# Patient Record
Sex: FEMALE | Race: WHITE | Employment: OTHER | URBAN - METROPOLITAN AREA
[De-identification: names, ages, dates, MRNs, and addresses within clinical notes are randomized per-mention and may not be internally consistent; named-entity substitution may affect disease eponyms.]

---

## 2017-10-19 ENCOUNTER — HOSPITAL ENCOUNTER (OUTPATIENT)
Dept: CT IMAGING | Age: 72
Discharge: HOME | End: 2017-10-19
Attending: FAMILY MEDICINE
Payer: MEDICARE

## 2017-10-19 DIAGNOSIS — R10.9 ACUTE LEFT FLANK PAIN: ICD-10-CM

## 2017-10-19 PROCEDURE — 74176 CT ABD & PELVIS W/O CONTRAST: CPT

## 2017-10-19 NOTE — PROGRESS NOTES
Discussed results with pt and the flank pain has resolved.  Reviewed diet changes for diverticulosis and if pain returns will refer to GI

## 2017-10-24 ENCOUNTER — APPOINTMENT (RX ONLY)
Dept: URBAN - METROPOLITAN AREA CLINIC 349 | Facility: CLINIC | Age: 72
Setting detail: DERMATOLOGY
End: 2017-10-24

## 2017-10-24 DIAGNOSIS — D18.0 HEMANGIOMA: ICD-10-CM

## 2017-10-24 DIAGNOSIS — B07.8 OTHER VIRAL WARTS: ICD-10-CM

## 2017-10-24 PROBLEM — D18.01 HEMANGIOMA OF SKIN AND SUBCUTANEOUS TISSUE: Status: ACTIVE | Noted: 2017-10-24

## 2017-10-24 PROCEDURE — ? COUNSELING

## 2017-10-24 PROCEDURE — 99242 OFF/OP CONSLTJ NEW/EST SF 20: CPT

## 2017-10-24 PROCEDURE — ? RECOMMENDATIONS

## 2017-10-24 ASSESSMENT — LOCATION ZONE DERM
LOCATION ZONE: TRUNK
LOCATION ZONE: MUCOUS_MEMBRANE

## 2017-10-24 ASSESSMENT — LOCATION DETAILED DESCRIPTION DERM
LOCATION DETAILED: UPPER STERNUM
LOCATION DETAILED: LEFT VENTRAL TONGUE

## 2017-10-24 ASSESSMENT — LOCATION SIMPLE DESCRIPTION DERM
LOCATION SIMPLE: VENTRAL TONGUE
LOCATION SIMPLE: CHEST

## 2018-05-01 ENCOUNTER — APPOINTMENT (RX ONLY)
Dept: URBAN - METROPOLITAN AREA CLINIC 349 | Facility: CLINIC | Age: 73
Setting detail: DERMATOLOGY
End: 2018-05-01

## 2018-05-01 DIAGNOSIS — B07.8 OTHER VIRAL WARTS: ICD-10-CM

## 2018-05-01 DIAGNOSIS — D18.0 HEMANGIOMA: ICD-10-CM

## 2018-05-01 PROBLEM — D18.01 HEMANGIOMA OF SKIN AND SUBCUTANEOUS TISSUE: Status: ACTIVE | Noted: 2018-05-01

## 2018-05-01 PROCEDURE — 11301 SHAVE SKIN LESION 0.6-1.0 CM: CPT

## 2018-05-01 PROCEDURE — ? COUNSELING

## 2018-05-01 PROCEDURE — 88305 TISSUE EXAM BY PATHOLOGIST: CPT

## 2018-05-01 PROCEDURE — ? RECOMMENDATIONS

## 2018-05-01 PROCEDURE — ? SHAVE REMOVAL

## 2018-05-01 PROCEDURE — A4550 SURGICAL TRAYS: HCPCS

## 2018-05-01 PROCEDURE — 99213 OFFICE O/P EST LOW 20 MIN: CPT | Mod: 25

## 2018-05-01 PROCEDURE — ? PATHOLOGY BILLING

## 2018-05-01 ASSESSMENT — LOCATION ZONE DERM
LOCATION ZONE: TRUNK
LOCATION ZONE: TRUNK
LOCATION ZONE: MUCOUS_MEMBRANE

## 2018-05-01 ASSESSMENT — LOCATION DETAILED DESCRIPTION DERM
LOCATION DETAILED: UPPER STERNUM
LOCATION DETAILED: UPPER STERNUM
LOCATION DETAILED: LEFT VENTRAL TONGUE

## 2018-05-01 ASSESSMENT — LOCATION SIMPLE DESCRIPTION DERM
LOCATION SIMPLE: CHEST
LOCATION SIMPLE: CHEST
LOCATION SIMPLE: VENTRAL TONGUE

## 2018-05-01 NOTE — PROCEDURE: RECOMMENDATIONS
Detail Level: Zone
Recommendations (Free Text): Patient states she went to ENT for lesion under tongue in January and they removed it, she states told was benign

## 2018-05-01 NOTE — PROCEDURE: SHAVE REMOVAL
Size Of Lesion In Cm (Required): 0.7
Billing Type: Third-Party Bill
Accession #: [id number]
Anesthesia Volume In Cc: 0.5
Biopsy Method: Ashli ruiz
Anesthesia Type: 1% lidocaine with epinephrine and a 1:10 solution of 8.4% sodium bicarbonate
Wound Care: Vaseline
Hemostasis: Aluminum Chloride
Post-Care Instructions: I reviewed with the patient in detail post-care instructions. Patient is to keep the biopsy site dry overnight, and then apply bacitracin twice daily until healed. Patient may apply hydrogen peroxide soaks to remove any crusting.
Medical Necessity Clause: This procedure was medically necessary because the lesion that was treated was: irritated and two tone color
Medical Necessity Information: It is in your best interest to select a reason for this procedure from the list below. All of these items fulfill various CMS LCD requirements except the new and changing color options.
Was A Bandage Applied: Yes
Bill 75248 For Specimen Handling/Conveyance To Laboratory?: no
X Size Of Lesion In Cm (Optional): 0
Notification Instructions: Patient will be notified of biopsy results. However, patient instructed to call the office if not contacted within 2 weeks.
Consent was obtained from the patient. The risks and benefits to therapy were discussed in detail. Specifically, the risks of infection, scarring, bleeding, prolonged wound healing, incomplete removal, allergy to anesthesia, nerve injury and recurrence were addressed. Prior to the procedure, the treatment site was clearly identified and confirmed by the patient. All components of Universal Protocol/PAUSE Rule completed.
Detail Level: Detailed

## 2023-03-06 ENCOUNTER — OFFICE VISIT (OUTPATIENT)
Dept: INTERNAL MEDICINE CLINIC | Facility: CLINIC | Age: 78
End: 2023-03-06

## 2023-03-06 VITALS
SYSTOLIC BLOOD PRESSURE: 138 MMHG | HEIGHT: 63 IN | DIASTOLIC BLOOD PRESSURE: 78 MMHG | BODY MASS INDEX: 23.32 KG/M2 | OXYGEN SATURATION: 95 % | HEART RATE: 79 BPM | WEIGHT: 131.6 LBS

## 2023-03-06 DIAGNOSIS — Z12.31 ENCOUNTER FOR SCREENING MAMMOGRAM FOR MALIGNANT NEOPLASM OF BREAST: ICD-10-CM

## 2023-03-06 DIAGNOSIS — Z12.39 ENCOUNTER FOR SCREENING FOR MALIGNANT NEOPLASM OF BREAST, UNSPECIFIED SCREENING MODALITY: ICD-10-CM

## 2023-03-06 DIAGNOSIS — K57.90 DIVERTICULOSIS: ICD-10-CM

## 2023-03-06 DIAGNOSIS — Z00.00 ENCOUNTER FOR MEDICAL EXAMINATION TO ESTABLISH CARE: ICD-10-CM

## 2023-03-06 DIAGNOSIS — Z09 ENCOUNTER FOR FOLLOW-UP EXAMINATION AFTER COMPLETED TREATMENT FOR CONDITIONS OTHER THAN MALIGNANT NEOPLASM: ICD-10-CM

## 2023-03-06 DIAGNOSIS — R19.8 ABDOMINAL DISORDERS: ICD-10-CM

## 2023-03-06 DIAGNOSIS — M25.9: ICD-10-CM

## 2023-03-06 DIAGNOSIS — R19.8 ABDOMINAL DISORDERS: Primary | ICD-10-CM

## 2023-03-06 DIAGNOSIS — Z12.11 SCREEN FOR COLON CANCER: ICD-10-CM

## 2023-03-06 DIAGNOSIS — Z13.820 SCREENING FOR OSTEOPOROSIS: ICD-10-CM

## 2023-03-06 LAB
BASOPHILS # BLD: 0.1 K/UL (ref 0–0.2)
BASOPHILS NFR BLD: 1 % (ref 0–2)
DIFFERENTIAL METHOD BLD: ABNORMAL
EOSINOPHIL # BLD: 0.3 K/UL (ref 0–0.8)
EOSINOPHIL NFR BLD: 3 % (ref 0.5–7.8)
ERYTHROCYTE [DISTWIDTH] IN BLOOD BY AUTOMATED COUNT: 12.5 % (ref 11.9–14.6)
HCT VFR BLD AUTO: 30.7 % (ref 35.8–46.3)
HGB BLD-MCNC: 9.7 G/DL (ref 11.7–15.4)
IMM GRANULOCYTES # BLD AUTO: 0 K/UL (ref 0–0.5)
IMM GRANULOCYTES NFR BLD AUTO: 0 % (ref 0–5)
LYMPHOCYTES # BLD: 2.8 K/UL (ref 0.5–4.6)
LYMPHOCYTES NFR BLD: 31 % (ref 13–44)
MCH RBC QN AUTO: 28.8 PG (ref 26.1–32.9)
MCHC RBC AUTO-ENTMCNC: 31.6 G/DL (ref 31.4–35)
MCV RBC AUTO: 91.1 FL (ref 82–102)
MONOCYTES # BLD: 0.7 K/UL (ref 0.1–1.3)
MONOCYTES NFR BLD: 7 % (ref 4–12)
NEUTS SEG # BLD: 5.1 K/UL (ref 1.7–8.2)
NEUTS SEG NFR BLD: 58 % (ref 43–78)
NRBC # BLD: 0 K/UL (ref 0–0.2)
PLATELET # BLD AUTO: 395 K/UL (ref 150–450)
PMV BLD AUTO: 11.6 FL (ref 9.4–12.3)
RBC # BLD AUTO: 3.37 M/UL (ref 4.05–5.2)
WBC # BLD AUTO: 9 K/UL (ref 4.3–11.1)

## 2023-03-06 RX ORDER — MULTIVIT WITH MINERALS/LUTEIN
250 TABLET ORAL DAILY
COMMUNITY

## 2023-03-06 SDOH — ECONOMIC STABILITY: FOOD INSECURITY: WITHIN THE PAST 12 MONTHS, YOU WORRIED THAT YOUR FOOD WOULD RUN OUT BEFORE YOU GOT MONEY TO BUY MORE.: NEVER TRUE

## 2023-03-06 SDOH — ECONOMIC STABILITY: HOUSING INSECURITY
IN THE LAST 12 MONTHS, WAS THERE A TIME WHEN YOU DID NOT HAVE A STEADY PLACE TO SLEEP OR SLEPT IN A SHELTER (INCLUDING NOW)?: NO

## 2023-03-06 SDOH — ECONOMIC STABILITY: INCOME INSECURITY: HOW HARD IS IT FOR YOU TO PAY FOR THE VERY BASICS LIKE FOOD, HOUSING, MEDICAL CARE, AND HEATING?: NOT VERY HARD

## 2023-03-06 SDOH — ECONOMIC STABILITY: FOOD INSECURITY: WITHIN THE PAST 12 MONTHS, THE FOOD YOU BOUGHT JUST DIDN'T LAST AND YOU DIDN'T HAVE MONEY TO GET MORE.: NEVER TRUE

## 2023-03-06 ASSESSMENT — PATIENT HEALTH QUESTIONNAIRE - PHQ9
1. LITTLE INTEREST OR PLEASURE IN DOING THINGS: 0
2. FEELING DOWN, DEPRESSED OR HOPELESS: 0
SUM OF ALL RESPONSES TO PHQ QUESTIONS 1-9: 0
SUM OF ALL RESPONSES TO PHQ9 QUESTIONS 1 & 2: 0
SUM OF ALL RESPONSES TO PHQ QUESTIONS 1-9: 0

## 2023-03-06 ASSESSMENT — ENCOUNTER SYMPTOMS
GASTROINTESTINAL NEGATIVE: 1
RESPIRATORY NEGATIVE: 1

## 2023-03-06 NOTE — PROGRESS NOTES
FOLLOW UP VISIT    Subjective:    Lisa Mccarthy (: 1945) is a 70 y.o., female,   Chief Complaint   Patient presents with    [facility]       HPI:  Very nice 70year old in to [facility]. She has the following medical issues  1. Cataract Removal in  has an eye Doctor [facility] eye  2. HCM  Mammogram- 10 years ago  COLO- 12 years ago   Dexa never  Vaccines needs pneuo vax has had TDAP, Flu and Shingles   3. Today she is c/o lump in her groin that she has had for a while since 2017.    4. Diverticula         The following portions of the patient's history were reviewed and updated as appropriate:      No past medical history on file. Past Surgical History:   Procedure Laterality Date    CATARACT REMOVAL Bilateral        No family history on file. Social History     Socioeconomic History    Marital status: Life Partner     Spouse name: Not on file    Number of children: Not on file    Years of education: Not on file    Highest education level: Not on file   Occupational History    Not on file   Tobacco Use    Smoking status: Former     Packs/day: 0.25     Years: 27.00     Pack years: 6.75     Types: Cigarettes     Start date: 5     Quit date:      Years since quittin.1    Smokeless tobacco: Never   Substance and Sexual Activity    Alcohol use: Yes     Alcohol/week: 3.0 standard drinks     Types: 3 Glasses of wine per week    Drug use: Not on file    Sexual activity: Not Currently   Other Topics Concern    Not on file   Social History Narrative    Not on file     Social Determinants of Health     Financial Resource Strain: Low Risk     Difficulty of Paying Living Expenses: Not very hard   Food Insecurity: No Food Insecurity    Worried About Running Out of Food in the Last Year: Never true    Ran Out of Food in the Last Year: Never true   Transportation Needs: Unknown    Lack of Transportation (Medical): Not on file    Lack of Transportation (Non-Medical):  No   Physical Activity: Not on file   Stress: Not on file   Social Connections: Not on file   Intimate Partner Violence: Not on file   Housing Stability: Unknown    Unable to Pay for Housing in the Last Year: Not on file    Number of Places Lived in the Last Year: Not on file    Unstable Housing in the Last Year: No       Current Outpatient Medications   Medication Sig Dispense Refill    Probiotic Product (PROBIOTIC DAILY PO) Take by mouth daily      Ascorbic Acid (VITAMIN C) 250 MG tablet Take 250 mg by mouth daily      Multiple Vitamin (MULTIVITAMIN ADULT PO) Take by mouth daily      cyanocobalamin 1000 MCG tablet Take 1,000 mcg by mouth daily (Patient not taking: Reported on 3/6/2023)       No current facility-administered medications for this visit. Allergies as of 03/06/2023    (No Known Allergies)       Review of Systems   Constitutional: Negative. Respiratory: Negative. Cardiovascular: Negative. Gastrointestinal: Negative. Genitourinary: Negative. Objective:    Blood pressure 138/78, pulse 79, height 5' 3\" (1.6 m), weight 131 lb 9.6 oz (59.7 kg), SpO2 95 %. Physical Exam    No results found for this visit on 03/06/23. Assessent & Plan    1. Cataract Removal in 2021 has an eye Doctor [facility] eye get records  2. HCM- will order screens  Mammogram- 10 years ago   COLO- 12 years ago   Dexa never  Vaccines needs pneuo forrest has had TDAP, Flu and Shingles   3. Today she is c/o lump in her groin that she has had for a while since 2017.  - check CT check labs   4. Diverticula     The patient and/or patient representative voiced understanding and agreement with the current diagnoses, recommendations, and possible side effects. No follow-up provider specified.       Cesar Abad MD

## 2023-03-07 LAB
ALBUMIN SERPL-MCNC: 4.2 G/DL (ref 3.2–4.6)
ALBUMIN/GLOB SERPL: 1.5 (ref 0.4–1.6)
ALP SERPL-CCNC: 80 U/L (ref 50–136)
ALT SERPL-CCNC: 26 U/L (ref 12–65)
ANION GAP SERPL CALC-SCNC: 2 MMOL/L (ref 2–11)
AST SERPL-CCNC: 18 U/L (ref 15–37)
BILIRUB SERPL-MCNC: 0.2 MG/DL (ref 0.2–1.1)
BUN SERPL-MCNC: 18 MG/DL (ref 8–23)
CALCIUM SERPL-MCNC: 9.4 MG/DL (ref 8.3–10.4)
CHLORIDE SERPL-SCNC: 110 MMOL/L (ref 101–110)
CO2 SERPL-SCNC: 25 MMOL/L (ref 21–32)
CREAT SERPL-MCNC: 0.9 MG/DL (ref 0.6–1)
GLOBULIN SER CALC-MCNC: 2.8 G/DL (ref 2.8–4.5)
GLUCOSE SERPL-MCNC: 112 MG/DL (ref 65–100)
POTASSIUM SERPL-SCNC: 4.3 MMOL/L (ref 3.5–5.1)
PROT SERPL-MCNC: 7 G/DL (ref 6.3–8.2)
SODIUM SERPL-SCNC: 137 MMOL/L (ref 133–143)

## 2023-03-09 ENCOUNTER — PATIENT MESSAGE (OUTPATIENT)
Dept: INTERNAL MEDICINE CLINIC | Facility: CLINIC | Age: 78
End: 2023-03-09

## 2023-03-09 DIAGNOSIS — R19.8 ABDOMINAL DISORDERS: ICD-10-CM

## 2023-03-09 DIAGNOSIS — Z09 ENCOUNTER FOR FOLLOW-UP EXAMINATION AFTER COMPLETED TREATMENT FOR CONDITIONS OTHER THAN MALIGNANT NEOPLASM: ICD-10-CM

## 2023-03-09 DIAGNOSIS — Z12.31 ENCOUNTER FOR SCREENING MAMMOGRAM FOR MALIGNANT NEOPLASM OF BREAST: ICD-10-CM

## 2023-03-09 DIAGNOSIS — M25.9: ICD-10-CM

## 2023-03-09 DIAGNOSIS — Z00.00 ENCOUNTER FOR MEDICAL EXAMINATION TO ESTABLISH CARE: ICD-10-CM

## 2023-03-09 DIAGNOSIS — Z13.820 SCREENING FOR OSTEOPOROSIS: ICD-10-CM

## 2023-03-10 ENCOUNTER — TELEPHONE (OUTPATIENT)
Dept: INTERNAL MEDICINE CLINIC | Facility: CLINIC | Age: 78
End: 2023-03-10

## 2023-03-10 NOTE — TELEPHONE ENCOUNTER
----- Message from [address] [place] [address]. Coy Ramirez sent at 3/9/2023  5:06 PM EST -----  Regarding: CT abdominal scan  Sarkis Naranjo,    I just called scheduling and they need to know when you sent over the FAX because they do not have it. Please contact me in the morning and let me know what is going on. I don't need a mammogram and I don't need a bone density test, however I do need a CT scan.     Thanks ,  verónica rothman

## 2023-03-10 NOTE — TELEPHONE ENCOUNTER
From: Ryanne Albright  To: Dr. Horta Sender: 3/9/2023 4:03 PM EST  Subject: CT abdominal scan    Good Afternoon Dr. Gisel Jason,  I was in your office on Monday for a lump in my groin area. You suggested a CT scan, blood work Mammogram, and Bone Density test.   I've had the bloodwork done and a Mammogram and Bone Density test have been scheduled. When they scheduled the above tests, I asked about the CT scan, and they were unaware of it. I feel for the reason I came in, this would be the most important test. I see you requested it, but none one has scheduled it.   Please advise ,  thank you Alfonso Keen

## 2023-03-14 ENCOUNTER — TELEMEDICINE (OUTPATIENT)
Dept: INTERNAL MEDICINE CLINIC | Facility: CLINIC | Age: 78
End: 2023-03-14
Payer: MEDICARE

## 2023-03-14 DIAGNOSIS — D51.3 OTHER DIETARY VITAMIN B12 DEFICIENCY ANEMIA: ICD-10-CM

## 2023-03-14 DIAGNOSIS — D64.9 ANEMIA, UNSPECIFIED TYPE: Primary | ICD-10-CM

## 2023-03-14 DIAGNOSIS — R73.9 HIGH BLOOD SUGAR: ICD-10-CM

## 2023-03-14 PROCEDURE — 99214 OFFICE O/P EST MOD 30 MIN: CPT | Performed by: INTERNAL MEDICINE

## 2023-03-14 PROCEDURE — 1123F ACP DISCUSS/DSCN MKR DOCD: CPT | Performed by: INTERNAL MEDICINE

## 2023-03-14 PROCEDURE — 1036F TOBACCO NON-USER: CPT | Performed by: INTERNAL MEDICINE

## 2023-03-14 PROCEDURE — G8420 CALC BMI NORM PARAMETERS: HCPCS | Performed by: INTERNAL MEDICINE

## 2023-03-14 PROCEDURE — G8400 PT W/DXA NO RESULTS DOC: HCPCS | Performed by: INTERNAL MEDICINE

## 2023-03-14 PROCEDURE — G8484 FLU IMMUNIZE NO ADMIN: HCPCS | Performed by: INTERNAL MEDICINE

## 2023-03-14 PROCEDURE — G8427 DOCREV CUR MEDS BY ELIG CLIN: HCPCS | Performed by: INTERNAL MEDICINE

## 2023-03-14 PROCEDURE — 1090F PRES/ABSN URINE INCON ASSESS: CPT | Performed by: INTERNAL MEDICINE

## 2023-03-14 NOTE — PROGRESS NOTES
FOLLOW UP VISIT    Subjective:    Brayan Mitchell (: 1945) is a 70 y.o., female,   Chief Complaint   Patient presents with    Follow-up       HPI:  Very nice patient in for follow up. 1. Anemia- last colo over 12 years ago has been referred . Has not had it yet . Has been on iron and b12 in past   2. Cataracts s/p removal  3. High blood sugar - has noA1C done         The following portions of the patient's history were reviewed and updated as appropriate:      No past medical history on file. Past Surgical History:   Procedure Laterality Date    CATARACT REMOVAL Bilateral        No family history on file. Social History     Socioeconomic History    Marital status:      Spouse name: Not on file    Number of children: Not on file    Years of education: Not on file    Highest education level: Not on file   Occupational History    Not on file   Tobacco Use    Smoking status: Former     Packs/day: 0.25     Years: 27.00     Pack years: 6.75     Types: Cigarettes     Start date:      Quit date:      Years since quittin.2    Smokeless tobacco: Never   Substance and Sexual Activity    Alcohol use: Yes     Alcohol/week: 3.0 standard drinks     Types: 3 Glasses of wine per week    Drug use: Not on file    Sexual activity: Not Currently   Other Topics Concern    Not on file   Social History Narrative    Not on file     Social Determinants of Health     Financial Resource Strain: Low Risk     Difficulty of Paying Living Expenses: Not very hard   Food Insecurity: No Food Insecurity    Worried About Running Out of Food in the Last Year: Never true    Ran Out of Food in the Last Year: Never true   Transportation Needs: Unknown    Lack of Transportation (Medical): Not on file    Lack of Transportation (Non-Medical):  No   Physical Activity: Not on file   Stress: Not on file   Social Connections: Not on file   Intimate Partner Violence: Not on file   Housing Stability: Unknown    Unable to Pay for Housing in the Last Year: Not on file    Number of Places Lived in the Last Year: Not on file    Unstable Housing in the Last Year: No       Current Outpatient Medications   Medication Sig Dispense Refill    Probiotic Product (PROBIOTIC DAILY PO) Take by mouth daily      Ascorbic Acid (VITAMIN C) 250 MG tablet Take 250 mg by mouth daily      Multiple Vitamin (MULTIVITAMIN ADULT PO) Take by mouth daily       No current facility-administered medications for this visit. Allergies as of 03/14/2023    (No Known Allergies)       Review of Systems    Objective: There were no vitals taken for this visit. Physical Exam    No results found for this visit on 03/14/23. Assessent & Plan     Diagnosis Orders   1. Anemia, unspecified type  Vitamin B12    Iron      2. High blood sugar        3. Other dietary vitamin B12 deficiency anemia   Vitamin B12           Has a hx of anemia  Check B12 and Iron  Has not had CT or GI consult yet    Samara Ferreira was evaluated through a synchronous (real-time) audio-video encounter, and/or her healthcare decision maker, is aware that it is a billable service, which includes applicable co-pays, with coverage as determined by her insurance carrier. She provided verbal consent to proceed and patient identification was verified. This visit was conducted pursuant to the emergency declaration under the [facility], [address] [place] [address] authority and the [employer] General Act. A caregiver was present when appropriate. Ability to conduct physical exam was limited. The patient was located at home in a state where the provider was licensed to provide care. --Tori Tanner MD on 3/14/2023 at 11:02 AM    An electronic signature was used to authenticate this note.     The patient and/or patient representative voiced understanding and agreement with the current diagnoses, recommendations, and possible side effects. No follow-up provider specified.       Dania King MD

## 2023-03-15 DIAGNOSIS — M25.9: Primary | ICD-10-CM

## 2023-03-15 DIAGNOSIS — D64.9 ANEMIA, UNSPECIFIED TYPE: ICD-10-CM

## 2023-03-15 DIAGNOSIS — M25.9: ICD-10-CM

## 2023-03-15 DIAGNOSIS — D51.3 OTHER DIETARY VITAMIN B12 DEFICIENCY ANEMIA: ICD-10-CM

## 2023-03-15 DIAGNOSIS — R19.8 ABDOMINAL DISORDERS: ICD-10-CM

## 2023-03-16 LAB
IRON SERPL-MCNC: 78 UG/DL (ref 35–150)
VIT B12 SERPL-MCNC: 337 PG/ML (ref 193–986)

## 2023-03-22 ENCOUNTER — HOSPITAL ENCOUNTER (OUTPATIENT)
Dept: CT IMAGING | Age: 78
Discharge: HOME | End: 2023-03-25

## 2023-03-22 DIAGNOSIS — R19.8 ABDOMINAL DISORDERS: ICD-10-CM

## 2023-03-22 DIAGNOSIS — M25.9: ICD-10-CM

## 2023-03-27 ENCOUNTER — TELEMEDICINE (OUTPATIENT)
Dept: INTERNAL MEDICINE CLINIC | Facility: CLINIC | Age: 78
End: 2023-03-27
Payer: MEDICARE

## 2023-03-27 DIAGNOSIS — N28.9 RENAL LESION: Primary | ICD-10-CM

## 2023-03-27 DIAGNOSIS — K57.90 DIVERTICULOSIS: ICD-10-CM

## 2023-03-27 DIAGNOSIS — K86.2 PANCREAS CYST: ICD-10-CM

## 2023-03-27 DIAGNOSIS — R93.5 ABNORMAL FINDINGS ON DIAGNOSTIC IMAGING OF OTHER ABDOMINAL REGIONS, INCLUDING RETROPERITONEUM: ICD-10-CM

## 2023-03-27 DIAGNOSIS — D64.9 ANEMIA, UNSPECIFIED TYPE: ICD-10-CM

## 2023-03-27 PROCEDURE — 1090F PRES/ABSN URINE INCON ASSESS: CPT | Performed by: INTERNAL MEDICINE

## 2023-03-27 PROCEDURE — G8400 PT W/DXA NO RESULTS DOC: HCPCS | Performed by: INTERNAL MEDICINE

## 2023-03-27 PROCEDURE — G8427 DOCREV CUR MEDS BY ELIG CLIN: HCPCS | Performed by: INTERNAL MEDICINE

## 2023-03-27 PROCEDURE — G8484 FLU IMMUNIZE NO ADMIN: HCPCS | Performed by: INTERNAL MEDICINE

## 2023-03-27 PROCEDURE — 1036F TOBACCO NON-USER: CPT | Performed by: INTERNAL MEDICINE

## 2023-03-27 PROCEDURE — 1123F ACP DISCUSS/DSCN MKR DOCD: CPT | Performed by: INTERNAL MEDICINE

## 2023-03-27 PROCEDURE — G8420 CALC BMI NORM PARAMETERS: HCPCS | Performed by: INTERNAL MEDICINE

## 2023-03-27 PROCEDURE — 99214 OFFICE O/P EST MOD 30 MIN: CPT | Performed by: INTERNAL MEDICINE

## 2023-03-27 NOTE — PROGRESS NOTES
FOLLOW UP VISIT    Subjective:    Nelly Vivas (: 1945) is a 70 y.o., female,   No chief complaint on file. HPI:  HPI  Anemia- has not seen GI -   Finger pain is improved with D   Inguinal Hernia- on CT   Diverticulosis  Renal lesion- needs US   Pancreatic Cyst stable from 2017 repeat in 1-2 years    The following portions of the patient's history were reviewed and updated as appropriate:      History reviewed. No pertinent past medical history. Past Surgical History:   Procedure Laterality Date    CATARACT REMOVAL Bilateral        History reviewed. No pertinent family history. Social History     Socioeconomic History    Marital status:      Spouse name: Not on file    Number of children: Not on file    Years of education: Not on file    Highest education level: Not on file   Occupational History    Not on file   Tobacco Use    Smoking status: Former     Packs/day: 0.25     Years: 27.00     Pack years: 6.75     Types: Cigarettes     Start date: 5     Quit date:      Years since quittin.2    Smokeless tobacco: Never   Substance and Sexual Activity    Alcohol use: Yes     Alcohol/week: 3.0 standard drinks     Types: 3 Glasses of wine per week    Drug use: Not on file    Sexual activity: Not Currently   Other Topics Concern    Not on file   Social History Narrative    Not on file     Social Determinants of Health     Financial Resource Strain: Low Risk     Difficulty of Paying Living Expenses: Not very hard   Food Insecurity: No Food Insecurity    Worried About Running Out of Food in the Last Year: Never true    Ran Out of Food in the Last Year: Never true   Transportation Needs: Unknown    Lack of Transportation (Medical): Not on file    Lack of Transportation (Non-Medical):  No   Physical Activity: Not on file   Stress: Not on file   Social Connections: Not on file   Intimate Partner Violence: Not on file   Housing Stability: Unknown    Unable to Pay for Housing in the

## 2023-03-31 ENCOUNTER — HOSPITAL ENCOUNTER (OUTPATIENT)
Dept: MAMMOGRAPHY | Age: 78
End: 2023-03-31
Payer: MEDICARE

## 2023-03-31 PROCEDURE — 77067 SCR MAMMO BI INCL CAD: CPT

## 2023-03-31 PROCEDURE — 77080 DXA BONE DENSITY AXIAL: CPT

## 2023-04-04 ENCOUNTER — HOSPITAL ENCOUNTER (OUTPATIENT)
Dept: ULTRASOUND IMAGING | Age: 78
Discharge: HOME | End: 2023-04-07

## 2023-04-04 DIAGNOSIS — N28.9 RENAL LESION: ICD-10-CM

## 2023-04-04 DIAGNOSIS — N28.89 RENAL MASS: ICD-10-CM

## 2023-04-04 DIAGNOSIS — N28.89 RENAL MASS: Primary | ICD-10-CM

## 2023-04-18 ENCOUNTER — OFFICE VISIT (OUTPATIENT)
Dept: INTERNAL MEDICINE CLINIC | Facility: CLINIC | Age: 78
End: 2023-04-18
Payer: MEDICARE

## 2023-04-18 VITALS
SYSTOLIC BLOOD PRESSURE: 100 MMHG | HEART RATE: 74 BPM | HEIGHT: 62 IN | WEIGHT: 128.4 LBS | OXYGEN SATURATION: 98 % | BODY MASS INDEX: 23.63 KG/M2 | DIASTOLIC BLOOD PRESSURE: 60 MMHG

## 2023-04-18 DIAGNOSIS — D64.9 ANEMIA, UNSPECIFIED TYPE: ICD-10-CM

## 2023-04-18 DIAGNOSIS — K40.90 DIRECT INGUINAL HERNIA: Primary | ICD-10-CM

## 2023-04-18 PROCEDURE — G8420 CALC BMI NORM PARAMETERS: HCPCS | Performed by: INTERNAL MEDICINE

## 2023-04-18 PROCEDURE — 1090F PRES/ABSN URINE INCON ASSESS: CPT | Performed by: INTERNAL MEDICINE

## 2023-04-18 PROCEDURE — 99214 OFFICE O/P EST MOD 30 MIN: CPT | Performed by: INTERNAL MEDICINE

## 2023-04-18 PROCEDURE — G8399 PT W/DXA RESULTS DOCUMENT: HCPCS | Performed by: INTERNAL MEDICINE

## 2023-04-18 PROCEDURE — 1036F TOBACCO NON-USER: CPT | Performed by: INTERNAL MEDICINE

## 2023-04-18 PROCEDURE — G8427 DOCREV CUR MEDS BY ELIG CLIN: HCPCS | Performed by: INTERNAL MEDICINE

## 2023-04-18 PROCEDURE — 1123F ACP DISCUSS/DSCN MKR DOCD: CPT | Performed by: INTERNAL MEDICINE

## 2023-04-18 ASSESSMENT — PATIENT HEALTH QUESTIONNAIRE - PHQ9
SUM OF ALL RESPONSES TO PHQ QUESTIONS 1-9: 0
1. LITTLE INTEREST OR PLEASURE IN DOING THINGS: 0
SUM OF ALL RESPONSES TO PHQ QUESTIONS 1-9: 0
SUM OF ALL RESPONSES TO PHQ9 QUESTIONS 1 & 2: 0
SUM OF ALL RESPONSES TO PHQ QUESTIONS 1-9: 0
SUM OF ALL RESPONSES TO PHQ QUESTIONS 1-9: 0
2. FEELING DOWN, DEPRESSED OR HOPELESS: 0

## 2023-04-18 ASSESSMENT — ENCOUNTER SYMPTOMS
RESPIRATORY NEGATIVE: 1
GASTROINTESTINAL NEGATIVE: 1

## 2023-04-18 NOTE — PROGRESS NOTES
Partner Violence: Not on file   Housing Stability: Unknown    Unable to Pay for Housing in the Last Year: Not on file    Number of Fifi in the Last Year: Not on file    Unstable Housing in the Last Year: No       Current Outpatient Medications   Medication Sig Dispense Refill    Cholecalciferol (VITAMIN D-3 PO) Take by mouth daily      Probiotic Product (PROBIOTIC DAILY PO) Take by mouth daily      Ascorbic Acid (VITAMIN C) 250 MG tablet Take 1 tablet by mouth daily      Multiple Vitamin (MULTIVITAMIN ADULT PO) Take by mouth daily       No current facility-administered medications for this visit. Allergies as of 04/18/2023    (No Known Allergies)       Review of Systems   Constitutional: Negative. Respiratory: Negative. Cardiovascular: Negative. Gastrointestinal: Negative. Objective:    Blood pressure 100/60, pulse 74, height 5' 2\" (1.575 m), weight 128 lb 6.4 oz (58.2 kg), SpO2 98 %. Physical Exam  Vitals and nursing note reviewed. Constitutional:       Appearance: Normal appearance. Cardiovascular:      Rate and Rhythm: Normal rate and regular rhythm. Pulses: Normal pulses. Heart sounds: Normal heart sounds. Pulmonary:      Effort: Pulmonary effort is normal.      Breath sounds: Normal breath sounds. Neurological:      Mental Status: She is alert. No results found for this visit on 04/18/23. Assessent & Plan    1. Renal lesion  - cyst no further follow up  2. Pancreas cyst  Check in one year  -     CT ABDOMEN PELVIS W WO CONTRAST Additional Contrast? Radiologist Recommendation; Future  3. Diverticulosis     4. Anemia will re refer to GI again - will scheduled  5. Finger pain resolved       6. HCM  Is going to GI   Mammogram normal   The patient and/or patient representative voiced understanding and agreement with the current diagnoses, recommendations, and possible side effects. No follow-up provider specified.       Tenisha Osorio MD

## 2023-07-03 ENCOUNTER — PREP FOR PROCEDURE (OUTPATIENT)
Dept: SURGERY | Age: 78
End: 2023-07-03

## 2023-07-03 ENCOUNTER — OFFICE VISIT (OUTPATIENT)
Dept: SURGERY | Age: 78
End: 2023-07-03
Payer: MEDICARE

## 2023-07-03 VITALS
BODY MASS INDEX: 23.63 KG/M2 | DIASTOLIC BLOOD PRESSURE: 60 MMHG | WEIGHT: 128.4 LBS | HEIGHT: 62 IN | HEART RATE: 74 BPM | SYSTOLIC BLOOD PRESSURE: 128 MMHG

## 2023-07-03 DIAGNOSIS — K40.90 RIGHT INGUINAL HERNIA: Primary | ICD-10-CM

## 2023-07-03 PROCEDURE — 1090F PRES/ABSN URINE INCON ASSESS: CPT | Performed by: SURGERY

## 2023-07-03 PROCEDURE — G8399 PT W/DXA RESULTS DOCUMENT: HCPCS | Performed by: SURGERY

## 2023-07-03 PROCEDURE — G8427 DOCREV CUR MEDS BY ELIG CLIN: HCPCS | Performed by: SURGERY

## 2023-07-03 PROCEDURE — G8420 CALC BMI NORM PARAMETERS: HCPCS | Performed by: SURGERY

## 2023-07-03 PROCEDURE — 1036F TOBACCO NON-USER: CPT | Performed by: SURGERY

## 2023-07-03 PROCEDURE — 1123F ACP DISCUSS/DSCN MKR DOCD: CPT | Performed by: SURGERY

## 2023-07-03 PROCEDURE — 99203 OFFICE O/P NEW LOW 30 MIN: CPT | Performed by: SURGERY

## 2023-07-05 PROBLEM — K40.90 RIGHT INGUINAL HERNIA: Status: ACTIVE | Noted: 2023-07-05

## 2023-07-19 ENCOUNTER — ANESTHESIA EVENT (OUTPATIENT)
Dept: SURGERY | Age: 78
End: 2023-07-19
Payer: MEDICARE

## 2023-07-20 ENCOUNTER — ANESTHESIA (OUTPATIENT)
Dept: SURGERY | Age: 78
End: 2023-07-20
Payer: MEDICARE

## 2023-07-20 ENCOUNTER — HOSPITAL ENCOUNTER (OUTPATIENT)
Age: 78
Setting detail: OUTPATIENT SURGERY
Discharge: HOME | End: 2023-07-20
Attending: SURGERY | Admitting: SURGERY
Payer: MEDICARE

## 2023-07-20 ENCOUNTER — TELEPHONE (OUTPATIENT)
Dept: SURGERY | Age: 78
End: 2023-07-20

## 2023-07-20 VITALS
HEIGHT: 62 IN | HEART RATE: 67 BPM | BODY MASS INDEX: 23.58 KG/M2 | SYSTOLIC BLOOD PRESSURE: 129 MMHG | OXYGEN SATURATION: 93 % | DIASTOLIC BLOOD PRESSURE: 67 MMHG | RESPIRATION RATE: 16 BRPM | TEMPERATURE: 98.2 F | WEIGHT: 128.13 LBS

## 2023-07-20 DIAGNOSIS — G89.18 POST-OP PAIN: Primary | ICD-10-CM

## 2023-07-20 DIAGNOSIS — K40.90 RIGHT INGUINAL HERNIA: Primary | ICD-10-CM

## 2023-07-20 PROCEDURE — 2580000003 HC RX 258: Performed by: ANESTHESIOLOGY

## 2023-07-20 PROCEDURE — 3700000000 HC ANESTHESIA ATTENDED CARE: Performed by: SURGERY

## 2023-07-20 PROCEDURE — 6370000000 HC RX 637 (ALT 250 FOR IP): Performed by: ANESTHESIOLOGY

## 2023-07-20 PROCEDURE — 7100000010 HC PHASE II RECOVERY - FIRST 15 MIN: Performed by: SURGERY

## 2023-07-20 PROCEDURE — 6360000002 HC RX W HCPCS: Performed by: SURGERY

## 2023-07-20 PROCEDURE — 6360000002 HC RX W HCPCS

## 2023-07-20 PROCEDURE — C1727 CATH, BAL TIS DIS, NON-VAS: HCPCS | Performed by: SURGERY

## 2023-07-20 PROCEDURE — 7100000000 HC PACU RECOVERY - FIRST 15 MIN: Performed by: SURGERY

## 2023-07-20 PROCEDURE — 3600000004 HC SURGERY LEVEL 4 BASE: Performed by: SURGERY

## 2023-07-20 PROCEDURE — 3700000001 HC ADD 15 MINUTES (ANESTHESIA): Performed by: SURGERY

## 2023-07-20 PROCEDURE — 7100000011 HC PHASE II RECOVERY - ADDTL 15 MIN: Performed by: SURGERY

## 2023-07-20 PROCEDURE — 7100000001 HC PACU RECOVERY - ADDTL 15 MIN: Performed by: SURGERY

## 2023-07-20 PROCEDURE — 2709999900 HC NON-CHARGEABLE SUPPLY: Performed by: SURGERY

## 2023-07-20 PROCEDURE — 2500000003 HC RX 250 WO HCPCS

## 2023-07-20 PROCEDURE — C9399 UNCLASSIFIED DRUGS OR BIOLOG: HCPCS

## 2023-07-20 PROCEDURE — 3600000014 HC SURGERY LEVEL 4 ADDTL 15MIN: Performed by: SURGERY

## 2023-07-20 PROCEDURE — C1781 MESH (IMPLANTABLE): HCPCS | Performed by: SURGERY

## 2023-07-20 DEVICE — 3DMAX MESH, 10.8 CM X 16.0 CM (4.3" X 6.3"), RIGHT, LARGE
Type: IMPLANTABLE DEVICE | Site: INGUINAL | Status: FUNCTIONAL
Brand: 3DMAX

## 2023-07-20 RX ORDER — SODIUM CHLORIDE, SODIUM LACTATE, POTASSIUM CHLORIDE, CALCIUM CHLORIDE 600; 310; 30; 20 MG/100ML; MG/100ML; MG/100ML; MG/100ML
INJECTION, SOLUTION INTRAVENOUS CONTINUOUS
Status: DISCONTINUED | OUTPATIENT
Start: 2023-07-20 | End: 2023-07-20 | Stop reason: HOSPADM

## 2023-07-20 RX ORDER — EPHEDRINE SULFATE/0.9% NACL/PF 50 MG/5 ML
SYRINGE (ML) INTRAVENOUS PRN
Status: DISCONTINUED | OUTPATIENT
Start: 2023-07-20 | End: 2023-07-20 | Stop reason: SDUPTHER

## 2023-07-20 RX ORDER — OXYCODONE HYDROCHLORIDE AND ACETAMINOPHEN 5; 325 MG/1; MG/1
1 TABLET ORAL EVERY 6 HOURS PRN
Qty: 20 TABLET | Refills: 0 | Status: SHIPPED | OUTPATIENT
Start: 2023-07-20 | End: 2023-07-25

## 2023-07-20 RX ORDER — DEXAMETHASONE SODIUM PHOSPHATE 4 MG/ML
INJECTION, SOLUTION INTRA-ARTICULAR; INTRALESIONAL; INTRAMUSCULAR; INTRAVENOUS; SOFT TISSUE PRN
Status: DISCONTINUED | OUTPATIENT
Start: 2023-07-20 | End: 2023-07-20 | Stop reason: SDUPTHER

## 2023-07-20 RX ORDER — FENTANYL CITRATE 50 UG/ML
INJECTION, SOLUTION INTRAMUSCULAR; INTRAVENOUS PRN
Status: DISCONTINUED | OUTPATIENT
Start: 2023-07-20 | End: 2023-07-20 | Stop reason: SDUPTHER

## 2023-07-20 RX ORDER — SODIUM CHLORIDE 0.9 % (FLUSH) 0.9 %
5-40 SYRINGE (ML) INJECTION PRN
Status: DISCONTINUED | OUTPATIENT
Start: 2023-07-20 | End: 2023-07-20 | Stop reason: HOSPADM

## 2023-07-20 RX ORDER — ROCURONIUM BROMIDE 10 MG/ML
INJECTION, SOLUTION INTRAVENOUS PRN
Status: DISCONTINUED | OUTPATIENT
Start: 2023-07-20 | End: 2023-07-20 | Stop reason: SDUPTHER

## 2023-07-20 RX ORDER — HYDROMORPHONE HYDROCHLORIDE 2 MG/ML
0.25 INJECTION, SOLUTION INTRAMUSCULAR; INTRAVENOUS; SUBCUTANEOUS EVERY 5 MIN PRN
Status: DISCONTINUED | OUTPATIENT
Start: 2023-07-20 | End: 2023-07-20 | Stop reason: HOSPADM

## 2023-07-20 RX ORDER — MIDAZOLAM HYDROCHLORIDE 2 MG/2ML
2 INJECTION, SOLUTION INTRAMUSCULAR; INTRAVENOUS
Status: DISCONTINUED | OUTPATIENT
Start: 2023-07-20 | End: 2023-07-20 | Stop reason: HOSPADM

## 2023-07-20 RX ORDER — SODIUM CHLORIDE 9 MG/ML
INJECTION, SOLUTION INTRAVENOUS PRN
Status: DISCONTINUED | OUTPATIENT
Start: 2023-07-20 | End: 2023-07-20 | Stop reason: HOSPADM

## 2023-07-20 RX ORDER — SODIUM CHLORIDE 0.9 % (FLUSH) 0.9 %
5-40 SYRINGE (ML) INJECTION EVERY 12 HOURS SCHEDULED
Status: DISCONTINUED | OUTPATIENT
Start: 2023-07-20 | End: 2023-07-20 | Stop reason: HOSPADM

## 2023-07-20 RX ORDER — ONDANSETRON 2 MG/ML
INJECTION INTRAMUSCULAR; INTRAVENOUS PRN
Status: DISCONTINUED | OUTPATIENT
Start: 2023-07-20 | End: 2023-07-20 | Stop reason: SDUPTHER

## 2023-07-20 RX ORDER — LIDOCAINE HYDROCHLORIDE 10 MG/ML
1 INJECTION, SOLUTION INFILTRATION; PERINEURAL
Status: DISCONTINUED | OUTPATIENT
Start: 2023-07-20 | End: 2023-07-20 | Stop reason: HOSPADM

## 2023-07-20 RX ORDER — LIDOCAINE HYDROCHLORIDE 20 MG/ML
INJECTION, SOLUTION EPIDURAL; INFILTRATION; INTRACAUDAL; PERINEURAL PRN
Status: DISCONTINUED | OUTPATIENT
Start: 2023-07-20 | End: 2023-07-20 | Stop reason: SDUPTHER

## 2023-07-20 RX ORDER — PROPOFOL 10 MG/ML
INJECTION, EMULSION INTRAVENOUS PRN
Status: DISCONTINUED | OUTPATIENT
Start: 2023-07-20 | End: 2023-07-20 | Stop reason: SDUPTHER

## 2023-07-20 RX ORDER — DEXTROSE MONOHYDRATE 100 MG/ML
INJECTION, SOLUTION INTRAVENOUS CONTINUOUS PRN
Status: DISCONTINUED | OUTPATIENT
Start: 2023-07-20 | End: 2023-07-20 | Stop reason: HOSPADM

## 2023-07-20 RX ORDER — OXYCODONE HYDROCHLORIDE AND ACETAMINOPHEN 5; 325 MG/1; MG/1
1 TABLET ORAL EVERY 6 HOURS PRN
Qty: 20 TABLET | Refills: 0 | Status: SHIPPED | OUTPATIENT
Start: 2023-07-20 | End: 2023-07-20 | Stop reason: RX

## 2023-07-20 RX ORDER — ACETAMINOPHEN 500 MG
1000 TABLET ORAL ONCE
Status: COMPLETED | OUTPATIENT
Start: 2023-07-20 | End: 2023-07-20

## 2023-07-20 RX ORDER — OXYCODONE HYDROCHLORIDE 5 MG/1
5 TABLET ORAL
Status: COMPLETED | OUTPATIENT
Start: 2023-07-20 | End: 2023-07-20

## 2023-07-20 RX ORDER — HALOPERIDOL 5 MG/ML
1 INJECTION INTRAMUSCULAR
Status: DISCONTINUED | OUTPATIENT
Start: 2023-07-20 | End: 2023-07-20 | Stop reason: HOSPADM

## 2023-07-20 RX ORDER — BUPIVACAINE HYDROCHLORIDE 5 MG/ML
INJECTION, SOLUTION EPIDURAL; INTRACAUDAL PRN
Status: DISCONTINUED | OUTPATIENT
Start: 2023-07-20 | End: 2023-07-20 | Stop reason: ALTCHOICE

## 2023-07-20 RX ORDER — ONDANSETRON 2 MG/ML
4 INJECTION INTRAMUSCULAR; INTRAVENOUS
Status: DISCONTINUED | OUTPATIENT
Start: 2023-07-20 | End: 2023-07-20 | Stop reason: HOSPADM

## 2023-07-20 RX ADMIN — SODIUM CHLORIDE, SODIUM LACTATE, POTASSIUM CHLORIDE, AND CALCIUM CHLORIDE: 600; 310; 30; 20 INJECTION, SOLUTION INTRAVENOUS at 05:44

## 2023-07-20 RX ADMIN — SODIUM CHLORIDE, SODIUM LACTATE, POTASSIUM CHLORIDE, AND CALCIUM CHLORIDE: 600; 310; 30; 20 INJECTION, SOLUTION INTRAVENOUS at 07:36

## 2023-07-20 RX ADMIN — LIDOCAINE HYDROCHLORIDE 60 MG: 20 INJECTION, SOLUTION EPIDURAL; INFILTRATION; INTRACAUDAL; PERINEURAL at 07:39

## 2023-07-20 RX ADMIN — SODIUM CHLORIDE, SODIUM LACTATE, POTASSIUM CHLORIDE, AND CALCIUM CHLORIDE: 600; 310; 30; 20 INJECTION, SOLUTION INTRAVENOUS at 08:25

## 2023-07-20 RX ADMIN — FENTANYL CITRATE 50 MCG: 50 INJECTION, SOLUTION INTRAMUSCULAR; INTRAVENOUS at 07:39

## 2023-07-20 RX ADMIN — SODIUM CHLORIDE, SODIUM LACTATE, POTASSIUM CHLORIDE, AND CALCIUM CHLORIDE: 600; 310; 30; 20 INJECTION, SOLUTION INTRAVENOUS at 08:30

## 2023-07-20 RX ADMIN — ROCURONIUM BROMIDE 40 MG: 10 INJECTION, SOLUTION INTRAVENOUS at 07:42

## 2023-07-20 RX ADMIN — FENTANYL CITRATE 50 MCG: 50 INJECTION, SOLUTION INTRAMUSCULAR; INTRAVENOUS at 08:24

## 2023-07-20 RX ADMIN — ACETAMINOPHEN 1000 MG: 500 TABLET, FILM COATED ORAL at 05:46

## 2023-07-20 RX ADMIN — DEXAMETHASONE SODIUM PHOSPHATE 4 MG: 4 INJECTION, SOLUTION INTRAMUSCULAR; INTRAVENOUS at 08:06

## 2023-07-20 RX ADMIN — PROPOFOL 120 MG: 10 INJECTION, EMULSION INTRAVENOUS at 07:40

## 2023-07-20 RX ADMIN — ONDANSETRON 4 MG: 2 INJECTION INTRAMUSCULAR; INTRAVENOUS at 08:21

## 2023-07-20 RX ADMIN — OXYCODONE HYDROCHLORIDE 5 MG: 5 TABLET ORAL at 08:57

## 2023-07-20 RX ADMIN — SUGAMMADEX 200 MG: 100 INJECTION, SOLUTION INTRAVENOUS at 08:19

## 2023-07-20 RX ADMIN — Medication 2000 MG: at 07:58

## 2023-07-20 RX ADMIN — Medication 10 MG: at 07:51

## 2023-07-20 ASSESSMENT — PAIN DESCRIPTION - LOCATION: LOCATION: ABDOMEN

## 2023-07-20 ASSESSMENT — PAIN DESCRIPTION - DESCRIPTORS: DESCRIPTORS: BURNING

## 2023-07-20 ASSESSMENT — PAIN - FUNCTIONAL ASSESSMENT
PAIN_FUNCTIONAL_ASSESSMENT: 0-10
PAIN_FUNCTIONAL_ASSESSMENT: 0-10

## 2023-07-20 ASSESSMENT — PAIN SCALES - GENERAL: PAINLEVEL_OUTOF10: 7

## 2023-07-20 NOTE — PROGRESS NOTES
The [facility] pharmacy that  was initially listed on chart did not have the Percocet medication that was prescribed per Dr. BELTRAN [facility] . The MOA at office has updated chart and an e-script was sent for Percocet 5 mg/ 325 mg one po every 6 hrs PRN # 20 tabs.

## 2023-07-20 NOTE — TELEPHONE ENCOUNTER
Pt called stating that [facility] in [address] did not have the pain medication prescribed available at that store. I called \"[id number] [address], [address] [place] [address]" and cancelled the previous RX. Pt stated she would like RX sent to [facility] (already added in pt chart) because she had already checked with their pharmacy and they have it in stock.

## 2023-07-20 NOTE — OP NOTE
[facility]  OPERATIVE REPORT    Name:  Juan Jose Merlos  MR#:  [id number]  :  1945  ACCOUNT #:  [de-identified]  DATE OF SERVICE:  2023    PREOPERATIVE DIAGNOSIS:  Right inguinal hernia. POSTOPERATIVE DIAGNOSIS:  Right direct inguinal hernia. PROCEDURE PERFORMED:  Laparoscopic right inguinal hernia repair with mesh. SURGEON:  Oz Ragland MD    ASSISTANT:  first Chris assistant. ANESTHESIA:  General endotracheal anesthesia plus 30 mL 0.5% Marcaine used as local anesthesia around the three incision sites at the end of the procedure. COMPLICATIONS:  None. SPECIMENS REMOVED:  None    IMPLANTS:  None. ESTIMATED BLOOD LOSS:  Minimal.    DRAINS:  None. HISTORY:  This is a 70-year-old female who was referred by Dr. Kelly Camargo for right groin pain. A CT scan done showed a fat-containing right inguinal hernia. I had a difficult time reducing the hernia in the office. She was having increasing pain at this site when she bent over, coughed, laughed, sneezed or strained. I recommended a laparoscopic possible open right inguinal hernia repair. She was agreeable, signed the consent form. The patient was seen in the preop area with a friend Diamond Villegas, who was giving her a ride home. The patient had the right groin area marked with a marking pen after she pointed out the hernia. The patient was then transported to room #9 [facility] operating room. She was placed on the operating table in supine position where general endotracheal anesthesia was administered without complications. She received 2 g of Ancef as prophylactic antibiotic coverage. Her abdomen was prepped and draped in usual sterile manner. I did a time-out identifying the patient, surgeon, procedure and her birth date of 1945. When everyone in the room agreed, we began the procedure.   We made an incision to the patient's right of the umbilicus overlying the right rectus

## 2023-07-20 NOTE — DISCHARGE INSTRUCTIONS
1. Diet as tolerated except for a  low fat diet after laparoscopic cholecystectomy. 2. Showering is allowed, but no tub baths, hot tubs or swimming. 3. Drainage is common from the wounds. Change the dressings as needed. Call our office if the wounds become reddened, tender, feel warm to the touch or pus starts to drain from the wound. 4. Take prescribed pain medication as directed, usually Percocet, Norco, Ultram or Dilaudid. Take over the counter medication for minor pain. 5. Ice may be applied intermittently to the surgical site or sites. 6. Call or office, [phone], if problems arise. 7. Follow up in the office at the assigned time. 8. Resume all medications as taken per surgery, unless specifically instructed not to take certain ones. 9. No lifting more than 25 pounds until told otherwise. 10. Driving is allowed 3 days after surgery as long as you feel comfortable enough to drive and have not taken any prescription pain medication prior to driving. MEDICATION INTERACTION:  During your procedure you potentially received a medication or medications which may reduce the effectiveness of oral contraceptives. Please consider other forms of contraception for 1 month following your procedure if you are currently using oral contraceptives as your primary form of birth control.  In addition to this, we recommend continuing your oral contraceptive as prescribed, unless otherwise instructed by your physician, during this time    After general anesthesia or intravenous sedation, for 24 hours or while taking prescription Narcotics:  Limit your activities  A responsible adult needs to be with you for the next 24 hours  Do not drive and operate hazardous machinery  Do not make important personal or business decisions  Do not drink alcoholic beverages  If you have not urinated within 8 hours after discharge, and you are experiencing discomfort from urinary retention, please go to the nearest

## 2023-07-20 NOTE — INTERVAL H&P NOTE
Update History & Physical    The patient's History and Physical of 7/14/23 was reviewed with the patient and I examined the patient. There was no change. The surgical site was confirmed by the patient and me. Plan: The risks, benefits, expected outcome, and alternative to the recommended procedure have been discussed with the patient. Patient understands and wants to proceed with the procedure.      Electronically signed by Luis Manuel Suarez MD on 7/20/2023 at 6:28 AM

## 2023-07-20 NOTE — ANESTHESIA POSTPROCEDURE EVALUATION
Department of Anesthesiology  Postprocedure Note    Patient: Tiffany Rdz  MRN: [id number]  YOB: 1945  Date of evaluation: 7/20/2023      Procedure Summary     Date: 07/20/23 Room / Location: [facility] MAIN OR 09 / [facility] MAIN OR    Anesthesia Start: 0726 Anesthesia Stop: 4966    Procedure: LAPAROSCOPIC RIGHT INGUINAL HERNIA REPAIR WITH MESH (Right: Abdomen) Diagnosis:       Right inguinal hernia      (Right inguinal hernia [K40.90])    Providers: Yong Flores MD Responsible Provider: Kishan Xie MD    Anesthesia Type: General ASA Status: 2          Anesthesia Type: General    Eran Phase I: Eran Score: 8    Eran Phase II: Eran Score: 10      Anesthesia Post Evaluation    Patient location during evaluation: PACU  Patient participation: complete - patient participated  Level of consciousness: awake and alert  Airway patency: patent  Nausea: well controlled. Complications: no  Cardiovascular status: acceptable.   Respiratory status: acceptable  Hydration status: stable

## 2023-07-20 NOTE — PERIOP NOTE
Discharge instructions given to Brittany Díaz, patient's friend. They verbalized understanding and was given opportunity for questions.

## 2023-07-20 NOTE — BRIEF OP NOTE
Brief Postoperative Note      Patient: Mauricio Stanton  YOB: 1945  MRN: [id number]    Date of Procedure: 7/20/2023    Pre-Op Diagnosis Codes:     * Right inguinal hernia [K40.90]    Post-Op Diagnosis:  Right direct inguinal hernia       Procedure(s):  LAPAROSCOPIC RIGHT INGUINAL HERNIA REPAIR WITH MESH    Surgeon(s):  Elliott Yee MD    Assistant:  Surgical Assistant: Ivis Vicente    Anesthesia: General    Estimated Blood Loss (mL): Minimal    Complications: None    Specimens:   * No specimens in log *    Implants:  Implant Name Type Inv.  Item Serial No.  Lot No. LRB No. Used Action   MESH NARA [place] [employer] INGUINAL WHT POLYPR MFIL - [id number]  MESH NARA L [id number] R INGUINAL WHT POLYPR MFIL  BARD DAVOL-WD [id number] 1 Implanted         Drains:   [REMOVED] Urinary Catheter 07/20/23 2 Way (Removed)       Findings: See dictated note      Electronically signed by Elliott Yee MD on 7/20/2023 at 8:22 AM

## 2023-07-20 NOTE — ANESTHESIA PROCEDURE NOTES
Airway  Date/Time: 7/20/2023 7:44 AM  Urgency: elective    Airway not difficult    General Information and Staff    Patient location during procedure: OR  Performed: resident/CRNA     Indications and Patient Condition  Indications for airway management: anesthesia  Spontaneous Ventilation: absent  Sedation level: deep  Preoxygenated: yes  Patient position: sniffing  MILS not maintained throughout  Mask difficulty assessment: vent by bag mask    Final Airway Details  Final airway type: endotracheal airway      Successful airway: ETT  Cuffed: yes   Successful intubation technique: direct laryngoscopy  Facilitating devices/methods: intubating stylet  Endotracheal tube insertion site: oral  Blade: Arceo  Blade size: #2  ETT size (mm): 7.0  Cormack-Lehane Classification: grade I - full view of glottis  Placement verified by: chest auscultation and capnometry   Measured from: lips  ETT to lips (cm): 23  Number of attempts at approach: 1  Ventilation between attempts: bag mask  Number of other approaches attempted: 0    no

## 2023-07-20 NOTE — ANESTHESIA PRE PROCEDURE
03/06/2023 01:54 PM    GLUCOSE 112 03/06/2023 01:54 PM    PROT 7.0 03/06/2023 01:54 PM    CALCIUM 9.4 03/06/2023 01:54 PM    BILITOT 0.2 03/06/2023 01:54 PM    ALKPHOS 80 03/06/2023 01:54 PM    AST 18 03/06/2023 01:54 PM    ALT 26 03/06/2023 01:54 PM       POC Tests: No results for input(s): POCGLU, POCNA, POCK, POCCL, POCBUN, POCHEMO, POCHCT in the last 72 hours. Coags: No results found for: PROTIME, INR, APTT    HCG (If Applicable): No results found for: PREGTESTUR, PREGSERUM, HCG, HCGQUANT     ABGs: No results found for: PHART, PO2ART, ZDP1CYM, PYU1QNX, BEART, D0VZBCYY     Type & Screen (If Applicable):  No results found for: LABABO, LABRH    Drug/Infectious Status (If Applicable):  No results found for: HIV, HEPCAB    COVID-19 Screening (If Applicable): No results found for: COVID19        Anesthesia Evaluation  Patient summary reviewed and Nursing notes reviewed no history of anesthetic complications:   Airway: Mallampati: II  TM distance: >3 FB   Neck ROM: full  Mouth opening: > = 3 FB   Dental:    (+) upper dentures      Pulmonary:Negative Pulmonary ROS and normal exam        Smoker: former. Cardiovascular:Negative CV ROS                      Neuro/Psych:               GI/Hepatic/Renal:            ROS comment: Pancreatic cyst.   Endo/Other:    (+) blood dyscrasia: anemia:., .                 Abdominal:             Vascular: Other Findings:           Anesthesia Plan      general     ASA 2     (GETA)  Induction: intravenous. MIPS: Postoperative opioids intended. Anesthetic plan and risks discussed with patient.                         Amanda Cortez MD   7/20/2023

## 2023-07-24 ASSESSMENT — ENCOUNTER SYMPTOMS
ABDOMINAL DISTENTION: 0
VOMITING: 0
GASTROINTESTINAL NEGATIVE: 1
CONSTIPATION: 0
DIARRHEA: 0
NAUSEA: 0
COUGH: 0
ABDOMINAL PAIN: 0

## 2023-07-24 NOTE — PROGRESS NOTES
[address] [place] [address]  [phone]        Name: Narda Paiz      MRN: [id number]       : 1945             PCP: Naun Casillas MD       CC:    Chief Complaint   Patient presents with    Post-Op Check     Post op [facility] 23     Clinical History per Dr. BELTRAN [facility] office notes:  \" This is a 61-year-old female who was referred by Dr. Niurka Garcia for right groin pain. A CT scan done showed a fat-containing right inguinal hernia. I had a difficult time reducing the hernia in the office. She was having increasing pain at this site when she bent over, coughed, laughed, sneezed or strained. I recommended a laparoscopic possible open right inguinal hernia repair. She was agreeable, signed the consent form. \"   Surgery was then scheduled to be done. HPI:  .Narda Paiz is a 70y.o. year-old female who presents for a post-op visit s/p  Laparoscopic right inguinal(direct)  hernia repair with mesh done per Dr. BELTRAN [facility] on 2023. Patient reports she has been doing well, tolerating a regular diet, voiding without complications and having normal bowel movements. She reports mild dyspnea with walking which just started yesterday. She denies any dizziness or chest pain. I have instructed her to notify her PCP to ascertain if any further testing may be needed . Patient was given an incentive spirometer and she was able to perform at 1000 cc  without difficulty. Patient denies fevers, chills, nausea, vomiting  or incisional drainage. OBJECTIVE:   Review of Systems   Constitutional:  Negative for chills and fever. Respiratory:  Positive for shortness of breath. Negative for cough, chest tightness and wheezing. Dyspnea with walking only PRN   Cardiovascular: Negative. Negative for chest pain, palpitations and leg swelling. Gastrointestinal: Negative.   Negative for abdominal distention, abdominal pain, constipation, diarrhea, nausea

## 2023-08-02 ENCOUNTER — OFFICE VISIT (OUTPATIENT)
Dept: SURGERY | Age: 78
End: 2023-08-02

## 2023-08-02 DIAGNOSIS — Z09 POSTOPERATIVE EXAMINATION: Primary | ICD-10-CM

## 2023-08-02 PROCEDURE — 99024 POSTOP FOLLOW-UP VISIT: CPT | Performed by: NURSE PRACTITIONER

## 2023-08-02 ASSESSMENT — ENCOUNTER SYMPTOMS
CHEST TIGHTNESS: 0
SHORTNESS OF BREATH: 1
WHEEZING: 0

## 2023-08-02 NOTE — PATIENT INSTRUCTIONS
-Follow up PRN  -Ho heavy lifting > 20 pounds x 4 weeks post op  -No tub bath or getting in pool until incisional sites are completely healed  -Follow up with PCP PRN For routine medical management  -Resume diet as tolerated   -Call PCP related to dyspnea with walking to ascertain any further testing to be done  -Instructed on use of incentive spirometer  -If any chest pain or dizziness, go to ED to be seen

## 2023-08-03 ENCOUNTER — TELEPHONE (OUTPATIENT)
Dept: INTERNAL MEDICINE CLINIC | Facility: CLINIC | Age: 78
End: 2023-08-03

## 2023-08-03 NOTE — TELEPHONE ENCOUNTER
Pt reports the following:  SOB with exertion (walking incline, stairs)  Pt denies feeling lightheaded/dizzy  Pt educated if/when to seek eval/tx at ED/urgent care and verbalized understanding.   OV sched with PCP 8/7/23

## 2023-08-07 ENCOUNTER — OFFICE VISIT (OUTPATIENT)
Dept: INTERNAL MEDICINE CLINIC | Facility: CLINIC | Age: 78
End: 2023-08-07

## 2023-08-07 ENCOUNTER — HOSPITAL ENCOUNTER (OUTPATIENT)
Dept: CT IMAGING | Age: 78
Discharge: HOME | End: 2023-08-10
Attending: INTERNAL MEDICINE

## 2023-08-07 ENCOUNTER — TELEPHONE (OUTPATIENT)
Dept: INTERNAL MEDICINE CLINIC | Facility: CLINIC | Age: 78
End: 2023-08-07

## 2023-08-07 ENCOUNTER — HOSPITAL ENCOUNTER (INPATIENT)
Age: 78
LOS: 1 days | Discharge: HOME | DRG: 176 | End: 2023-08-08
Attending: EMERGENCY MEDICINE | Admitting: STUDENT IN AN ORGANIZED HEALTH CARE EDUCATION/TRAINING PROGRAM
Payer: MEDICARE

## 2023-08-07 VITALS
DIASTOLIC BLOOD PRESSURE: 62 MMHG | HEIGHT: 62 IN | HEART RATE: 69 BPM | OXYGEN SATURATION: 99 % | WEIGHT: 128 LBS | SYSTOLIC BLOOD PRESSURE: 104 MMHG | BODY MASS INDEX: 23.55 KG/M2

## 2023-08-07 DIAGNOSIS — R06.02 SOB (SHORTNESS OF BREATH): ICD-10-CM

## 2023-08-07 DIAGNOSIS — I26.94 MULTIPLE SUBSEGMENTAL PULMONARY EMBOLI WITHOUT ACUTE COR PULMONALE (HCC): Primary | ICD-10-CM

## 2023-08-07 DIAGNOSIS — R00.2 HEART PALPITATIONS: Primary | ICD-10-CM

## 2023-08-07 DIAGNOSIS — I26.99 BILATERAL PULMONARY EMBOLISM (HCC): ICD-10-CM

## 2023-08-07 DIAGNOSIS — D64.9 ANEMIA, UNSPECIFIED TYPE: ICD-10-CM

## 2023-08-07 DIAGNOSIS — R00.2 HEART PALPITATIONS: ICD-10-CM

## 2023-08-07 DIAGNOSIS — R06.09 DYSPNEA ON EXERTION: ICD-10-CM

## 2023-08-07 LAB
ALBUMIN SERPL-MCNC: 3.4 G/DL (ref 3.2–4.6)
ALBUMIN/GLOB SERPL: 0.9 (ref 0.4–1.6)
ALP SERPL-CCNC: 135 U/L (ref 50–136)
ALT SERPL-CCNC: 14 U/L (ref 12–65)
ANION GAP SERPL CALC-SCNC: 5 MMOL/L (ref 2–11)
ANION GAP SERPL CALC-SCNC: 8 MMOL/L (ref 2–11)
AST SERPL-CCNC: 14 U/L (ref 15–37)
BASOPHILS # BLD: 0.1 K/UL (ref 0–0.2)
BASOPHILS # BLD: 0.1 K/UL (ref 0–0.2)
BASOPHILS NFR BLD: 1 % (ref 0–2)
BASOPHILS NFR BLD: 2 % (ref 0–2)
BILIRUB SERPL-MCNC: 0.2 MG/DL (ref 0.2–1.1)
BUN SERPL-MCNC: 11 MG/DL (ref 8–23)
BUN SERPL-MCNC: 12 MG/DL (ref 8–23)
CALCIUM SERPL-MCNC: 8.7 MG/DL (ref 8.3–10.4)
CALCIUM SERPL-MCNC: 9.6 MG/DL (ref 8.3–10.4)
CHLORIDE SERPL-SCNC: 108 MMOL/L (ref 101–110)
CHLORIDE SERPL-SCNC: 108 MMOL/L (ref 101–110)
CO2 SERPL-SCNC: 24 MMOL/L (ref 21–32)
CO2 SERPL-SCNC: 25 MMOL/L (ref 21–32)
CREAT SERPL-MCNC: 0.85 MG/DL (ref 0.6–1)
CREAT SERPL-MCNC: 0.9 MG/DL (ref 0.6–1)
DIFFERENTIAL METHOD BLD: ABNORMAL
DIFFERENTIAL METHOD BLD: ABNORMAL
EKG ATRIAL RATE: 66 BPM
EKG DIAGNOSIS: NORMAL
EKG P AXIS: 76 DEGREES
EKG P-R INTERVAL: 179 MS
EKG Q-T INTERVAL: 418 MS
EKG QRS DURATION: 88 MS
EKG QTC CALCULATION (BAZETT): 438 MS
EKG R AXIS: 79 DEGREES
EKG T AXIS: 70 DEGREES
EKG VENTRICULAR RATE: 66 BPM
EOSINOPHIL # BLD: 0.3 K/UL (ref 0–0.8)
EOSINOPHIL # BLD: 0.3 K/UL (ref 0–0.8)
EOSINOPHIL NFR BLD: 3 % (ref 0.5–7.8)
EOSINOPHIL NFR BLD: 4 % (ref 0.5–7.8)
ERYTHROCYTE [DISTWIDTH] IN BLOOD BY AUTOMATED COUNT: 17.3 % (ref 11.9–14.6)
ERYTHROCYTE [DISTWIDTH] IN BLOOD BY AUTOMATED COUNT: 17.5 % (ref 11.9–14.6)
FERRITIN SERPL-MCNC: 14 NG/ML (ref 8–388)
GLOBULIN SER CALC-MCNC: 3.9 G/DL (ref 2.8–4.5)
GLUCOSE SERPL-MCNC: 102 MG/DL (ref 65–100)
GLUCOSE SERPL-MCNC: 111 MG/DL (ref 65–100)
HCT VFR BLD AUTO: 25.2 % (ref 35.8–46.3)
HCT VFR BLD AUTO: 27.4 % (ref 35.8–46.3)
HCT VFR BLD AUTO: 34.2 % (ref 35.8–46.3)
HGB BLD-MCNC: 7.2 G/DL (ref 11.7–15.4)
HGB BLD-MCNC: 7.5 G/DL (ref 11.7–15.4)
HGB BLD-MCNC: 9.4 G/DL (ref 11.7–15.4)
HISTORY CHECK: NORMAL
IMM GRANULOCYTES # BLD AUTO: 0 K/UL (ref 0–0.5)
IMM GRANULOCYTES # BLD AUTO: 0 K/UL (ref 0–0.5)
IMM GRANULOCYTES NFR BLD AUTO: 0 % (ref 0–5)
IMM GRANULOCYTES NFR BLD AUTO: 0 % (ref 0–5)
INR PPP: 1.1
IRON SATN MFR SERPL: 2 %
IRON SERPL-MCNC: 9 UG/DL (ref 35–150)
LYMPHOCYTES # BLD: 1.7 K/UL (ref 0.5–4.6)
LYMPHOCYTES # BLD: 2.6 K/UL (ref 0.5–4.6)
LYMPHOCYTES NFR BLD: 21 % (ref 13–44)
LYMPHOCYTES NFR BLD: 27 % (ref 13–44)
MCH RBC QN AUTO: 21.4 PG (ref 26.1–32.9)
MCH RBC QN AUTO: 21.6 PG (ref 26.1–32.9)
MCHC RBC AUTO-ENTMCNC: 27.4 G/DL (ref 31.4–35)
MCHC RBC AUTO-ENTMCNC: 28.6 G/DL (ref 31.4–35)
MCV RBC AUTO: 75 FL (ref 82–102)
MCV RBC AUTO: 78.7 FL (ref 82–102)
MONOCYTES # BLD: 0.7 K/UL (ref 0.1–1.3)
MONOCYTES # BLD: 0.7 K/UL (ref 0.1–1.3)
MONOCYTES NFR BLD: 7 % (ref 4–12)
MONOCYTES NFR BLD: 9 % (ref 4–12)
NEUTS SEG # BLD: 5.2 K/UL (ref 1.7–8.2)
NEUTS SEG # BLD: 5.9 K/UL (ref 1.7–8.2)
NEUTS SEG NFR BLD: 62 % (ref 43–78)
NEUTS SEG NFR BLD: 64 % (ref 43–78)
NRBC # BLD: 0 K/UL (ref 0–0.2)
NRBC # BLD: 0 K/UL (ref 0–0.2)
PLATELET # BLD AUTO: 519 K/UL (ref 150–450)
PLATELET # BLD AUTO: 539 K/UL (ref 150–450)
PMV BLD AUTO: 10.5 FL (ref 9.4–12.3)
PMV BLD AUTO: 9.9 FL (ref 9.4–12.3)
POTASSIUM SERPL-SCNC: 3.5 MMOL/L (ref 3.5–5.1)
POTASSIUM SERPL-SCNC: 4 MMOL/L (ref 3.5–5.1)
PROT SERPL-MCNC: 7.3 G/DL (ref 6.3–8.2)
PROTHROMBIN TIME: 14.3 SEC (ref 12.6–14.3)
RBC # BLD AUTO: 3.36 M/UL (ref 4.05–5.2)
RBC # BLD AUTO: 3.48 M/UL (ref 4.05–5.2)
SODIUM SERPL-SCNC: 137 MMOL/L (ref 133–143)
SODIUM SERPL-SCNC: 141 MMOL/L (ref 133–143)
TIBC SERPL-MCNC: 393 UG/DL (ref 250–450)
TROPONIN I SERPL HS-MCNC: 3.7 PG/ML (ref 0–14)
TSH, 3RD GENERATION: 27.3 UIU/ML (ref 0.36–3.74)
WBC # BLD AUTO: 8.1 K/UL (ref 4.3–11.1)
WBC # BLD AUTO: 9.6 K/UL (ref 4.3–11.1)

## 2023-08-07 PROCEDURE — 83550 IRON BINDING TEST: CPT

## 2023-08-07 PROCEDURE — 1100000000 HC RM PRIVATE

## 2023-08-07 PROCEDURE — 82728 ASSAY OF FERRITIN: CPT

## 2023-08-07 PROCEDURE — P9016 RBC LEUKOCYTES REDUCED: HCPCS

## 2023-08-07 PROCEDURE — 84484 ASSAY OF TROPONIN QUANT: CPT

## 2023-08-07 PROCEDURE — 85018 HEMOGLOBIN: CPT

## 2023-08-07 PROCEDURE — 83540 ASSAY OF IRON: CPT

## 2023-08-07 PROCEDURE — 80053 COMPREHEN METABOLIC PANEL: CPT

## 2023-08-07 PROCEDURE — 86901 BLOOD TYPING SEROLOGIC RH(D): CPT

## 2023-08-07 PROCEDURE — 94760 N-INVAS EAR/PLS OXIMETRY 1: CPT

## 2023-08-07 PROCEDURE — 99285 EMERGENCY DEPT VISIT HI MDM: CPT

## 2023-08-07 PROCEDURE — 86900 BLOOD TYPING SEROLOGIC ABO: CPT

## 2023-08-07 PROCEDURE — 2700000000 HC OXYGEN THERAPY PER DAY

## 2023-08-07 PROCEDURE — 36415 COLL VENOUS BLD VENIPUNCTURE: CPT

## 2023-08-07 PROCEDURE — 93010 ELECTROCARDIOGRAM REPORT: CPT | Performed by: INTERNAL MEDICINE

## 2023-08-07 PROCEDURE — 93005 ELECTROCARDIOGRAM TRACING: CPT | Performed by: EMERGENCY MEDICINE

## 2023-08-07 PROCEDURE — 85014 HEMATOCRIT: CPT

## 2023-08-07 PROCEDURE — 85025 COMPLETE CBC W/AUTO DIFF WBC: CPT

## 2023-08-07 PROCEDURE — 94762 N-INVAS EAR/PLS OXIMTRY CONT: CPT

## 2023-08-07 PROCEDURE — 6370000000 HC RX 637 (ALT 250 FOR IP): Performed by: EMERGENCY MEDICINE

## 2023-08-07 PROCEDURE — 30233N1 TRANSFUSION OF NONAUTOLOGOUS RED BLOOD CELLS INTO PERIPHERAL VEIN, PERCUTANEOUS APPROACH: ICD-10-PCS | Performed by: NURSE PRACTITIONER

## 2023-08-07 PROCEDURE — 94761 N-INVAS EAR/PLS OXIMETRY MLT: CPT

## 2023-08-07 PROCEDURE — 36430 TRANSFUSION BLD/BLD COMPNT: CPT

## 2023-08-07 PROCEDURE — 86923 COMPATIBILITY TEST ELECTRIC: CPT

## 2023-08-07 PROCEDURE — 85610 PROTHROMBIN TIME: CPT

## 2023-08-07 PROCEDURE — 86850 RBC ANTIBODY SCREEN: CPT

## 2023-08-07 RX ORDER — FAMOTIDINE 20 MG/1
10 TABLET, FILM COATED ORAL DAILY PRN
Status: DISCONTINUED | OUTPATIENT
Start: 2023-08-07 | End: 2023-08-07

## 2023-08-07 RX ORDER — BISACODYL 10 MG
10 SUPPOSITORY, RECTAL RECTAL DAILY PRN
Status: CANCELLED | OUTPATIENT
Start: 2023-08-07

## 2023-08-07 RX ORDER — POTASSIUM CHLORIDE 7.45 MG/ML
10 INJECTION INTRAVENOUS PRN
Status: CANCELLED | OUTPATIENT
Start: 2023-08-07

## 2023-08-07 RX ORDER — MAGNESIUM HYDROXIDE/ALUMINUM HYDROXICE/SIMETHICONE 120; 1200; 1200 MG/30ML; MG/30ML; MG/30ML
30 SUSPENSION ORAL EVERY 6 HOURS PRN
Status: CANCELLED | OUTPATIENT
Start: 2023-08-07

## 2023-08-07 RX ORDER — PANTOPRAZOLE SODIUM 40 MG/1
40 TABLET, DELAYED RELEASE ORAL
Status: DISCONTINUED | OUTPATIENT
Start: 2023-08-08 | End: 2023-08-08 | Stop reason: HOSPADM

## 2023-08-07 RX ORDER — ONDANSETRON 2 MG/ML
4 INJECTION INTRAMUSCULAR; INTRAVENOUS EVERY 6 HOURS PRN
Status: CANCELLED | OUTPATIENT
Start: 2023-08-07

## 2023-08-07 RX ORDER — SODIUM CHLORIDE 9 MG/ML
INJECTION, SOLUTION INTRAVENOUS PRN
Status: DISCONTINUED | OUTPATIENT
Start: 2023-08-07 | End: 2023-08-08 | Stop reason: HOSPADM

## 2023-08-07 RX ORDER — SODIUM CHLORIDE 0.9 % (FLUSH) 0.9 %
5-40 SYRINGE (ML) INJECTION EVERY 12 HOURS SCHEDULED
Status: CANCELLED | OUTPATIENT
Start: 2023-08-07

## 2023-08-07 RX ORDER — POLYETHYLENE GLYCOL 3350 17 G/17G
17 POWDER, FOR SOLUTION ORAL DAILY PRN
Status: CANCELLED | OUTPATIENT
Start: 2023-08-07

## 2023-08-07 RX ORDER — SODIUM CHLORIDE 9 MG/ML
INJECTION, SOLUTION INTRAVENOUS PRN
Status: CANCELLED | OUTPATIENT
Start: 2023-08-07

## 2023-08-07 RX ORDER — ACETAMINOPHEN 325 MG/1
650 TABLET ORAL EVERY 6 HOURS PRN
Status: CANCELLED | OUTPATIENT
Start: 2023-08-07

## 2023-08-07 RX ORDER — SODIUM CHLORIDE 0.9 % (FLUSH) 0.9 %
5-40 SYRINGE (ML) INJECTION PRN
Status: CANCELLED | OUTPATIENT
Start: 2023-08-07

## 2023-08-07 RX ORDER — ACETAMINOPHEN 650 MG/1
650 SUPPOSITORY RECTAL EVERY 6 HOURS PRN
Status: CANCELLED | OUTPATIENT
Start: 2023-08-07

## 2023-08-07 RX ORDER — MAGNESIUM SULFATE IN WATER 40 MG/ML
2000 INJECTION, SOLUTION INTRAVENOUS PRN
Status: CANCELLED | OUTPATIENT
Start: 2023-08-07

## 2023-08-07 RX ORDER — ONDANSETRON 4 MG/1
4 TABLET, ORALLY DISINTEGRATING ORAL EVERY 8 HOURS PRN
Status: CANCELLED | OUTPATIENT
Start: 2023-08-07

## 2023-08-07 RX ORDER — POTASSIUM CHLORIDE 20 MEQ/1
40 TABLET, EXTENDED RELEASE ORAL PRN
Status: CANCELLED | OUTPATIENT
Start: 2023-08-07

## 2023-08-07 RX ADMIN — RIVAROXABAN 15 MG: 15 TABLET, FILM COATED ORAL at 14:47

## 2023-08-07 ASSESSMENT — ENCOUNTER SYMPTOMS
COUGH: 0
SHORTNESS OF BREATH: 1
BACK PAIN: 0
ABDOMINAL PAIN: 0
ORTHOPNEA: 0
SPUTUM PRODUCTION: 0
HEMOPTYSIS: 0
GASTROINTESTINAL NEGATIVE: 1
RHINORRHEA: 0
SHORTNESS OF BREATH: 1

## 2023-08-07 ASSESSMENT — PAIN - FUNCTIONAL ASSESSMENT: PAIN_FUNCTIONAL_ASSESSMENT: 0-10

## 2023-08-07 ASSESSMENT — PAIN SCALES - GENERAL: PAINLEVEL_OUTOF10: 0

## 2023-08-07 NOTE — ED NOTES
TRANSFER - OUT REPORT:    Verbal report given to [address] on Aliyah Mansfield  being transferred to [facility]1 18  for routine progression of patient care       Report consisted of patient's Situation, Background, Assessment and   Recommendations(SBAR). Information from the following report(s) ED SBAR was reviewed with the receiving nurse. Lines:   Peripheral IV 08/07/23 Left Antecubital (Active)   Site Assessment Clean, dry & intact 08/07/23 1415   Line Status Brisk blood return;Capped;Flushed;Normal saline locked;Specimen collected 08/07/23 1415   Phlebitis Assessment No symptoms 08/07/23 1415   Infiltration Assessment 0 08/07/23 1415   Dressing Status Clean, dry & intact; New dressing applied 08/07/23 1415   Dressing Type Transparent 08/07/23 1415   Dressing Intervention New 08/07/23 1415       Peripheral IV 08/07/23 Right Forearm (Active)        Opportunity for questions and clarification was provided.       Patient transported with:  Lisa Fernández RN  08/07/23 7262

## 2023-08-07 NOTE — ED PROVIDER NOTES
tachycardia, recent surgery two weeks  ago,     PROCEDURE: Serial thin section axial images are obtained from the thoracic inlet  through the upper abdomen following the administration of intravenous contrast  per a dedicated, institutional pulmonary embolus protocol. Coronal MIP  reformatted images are generated. Radiation dose reduction techniques were used  for this study. Our CT scanners use one or all of the following: Automated  exposure control, adjusted of the mA and/or kV according to patient size,  iterative reconstruction    COMPARISON: No prior    FINDINGS:     The aorta is unremarkable. There is adequate opacification of the central  pulmonary arterial tree. Acute pulmonary emboli noted in multiple right lower  lobar and left lower lobar segmental pulmonary arteries. Clot burden is  peripheral and small. There is no right heart strain. No pleural or pericardial  effusion. There is dependent atelectasis. No airspace consolidation otherwise  evident. There is no pneumothorax. Limited evaluation of the upper abdomen is unremarkable. No aggressive bone lesions identified. Impression    1. Acute bilateral pulmonary emboli in the right lower lobe and left lower lobe  segmental pulmonary arteries. Clot burden is considered to be peripheral and  small. No right heart strain. [facility]  The critical results contained in this report were communicated to [facility] a nurse  in Dr. Mónica Estrada office by the [address] on 8/7/2023 at 1:30 PM.  Critical results were communicated as outlined in Section II.C.2.a.i of the ACR  Practice Guideline for Communication of Diagnostic Imaging Findings. On the recommendation of Dr. Viviane Virgen, the patient was advised to report directly  to the emergency room.        Results for orders placed or performed in visit on 08/07/23   CBC with Auto Differential   Result Value Ref Range    WBC 8.1 4.3 - 11.1 K/uL    RBC 3.48 (L) 4.05 - 5.2 M/uL    Hemoglobin 7.5 (L) 11.7 -

## 2023-08-07 NOTE — ACP (ADVANCE CARE PLANNING)
[facility] Hospitalist Service  At the heart of better care     Advance Care Planning   Admit Date:  2023  2:02 PM   Name:  Alycia Gregory   Age:  70 y.o. Sex:  female  :  1945   MRN:  [id number]   Room:  [place] [address]    Alycia Gregory is able to make her own decisions:   Yes    If pt unable to make decisions, POA/surrogate decision maker:  Santiago Leblanc Flank (per pt) at [phone]    Other people present:   None    Patient / surrogate decision-maker directed code status:  DNR/DNI    Other ACP topics discussed, if applicable: We discussed the need for blood transfusion(s). Pt agreed and consented. Patient or surrogate consented to discussion of the current conditions, workup, management plans, prognosis, and the risk for further deterioration. Time spent: 10 minutes in direct discussion.       Signed:  JACLYN Lang - ROSANNE

## 2023-08-07 NOTE — PROGRESS NOTES
Patient placed on 2L nasal canula for Hgb of 7.2       08/07/23 1710   Oxygen Therapy/Pulse Ox   O2 Device Nasal cannula   O2 Flow Rate (L/min) 2 L/min   Pulse 65   SpO2 100 %

## 2023-08-07 NOTE — ED TRIAGE NOTES
Patient ambulatory to triage. Patient states that she had surgery on the 20th. Since then she has been experiencing shortness of breath. Patient states she had an outpatient CT done today that showed a PE.

## 2023-08-07 NOTE — CONSENT
Informed Consent for Blood Component Transfusion Note    I have discussed with the patient the rationale for blood component transfusion; its benefits in treating or preventing fatigue, organ damage, or death; and its risk which includes mild transfusion reactions, rare risk of blood borne infection, or more serious but rare reactions. I have discussed the alternatives to transfusion, including the risk and consequences of not receiving transfusion. The patient had an opportunity to ask questions and had agreed to proceed with transfusion of blood components.     Electronically signed by Haider Norman MD on 8/7/23 at 3:30 PM EDT

## 2023-08-07 NOTE — H&P
Diminished breath sounds. Not on O2. Symmetric expansion. Abdomen:   Soft, nontender, nondistended. + Bowel sounds in all quads. Extremities: No cyanosis or clubbing. No edema  Skin:     No rashes. Skin is NOT pale in color. Warm and dry. Neuro:  CN II-XII grossly intact. Sensation intact. A&O x 4. Psych:  Normal mood and affect.       I have personally reviewed labs and tests:  Recent Labs:  Recent Results (from the past 24 hour(s))   CBC with Auto Differential    Collection Time: 08/07/23  9:08 AM   Result Value Ref Range    WBC 8.1 4.3 - 11.1 K/uL    RBC 3.48 (L) 4.05 - 5.2 M/uL    Hemoglobin 7.5 (L) 11.7 - 15.4 g/dL    Hematocrit 27.4 (L) 35.8 - 46.3 %    MCV 78.7 (L) 82 - 102 FL    MCH 21.6 (L) 26.1 - 32.9 PG    MCHC 27.4 (L) 31.4 - 35.0 g/dL    RDW 17.5 (H) 11.9 - 14.6 %    Platelets 479 (H) 665 - 450 K/uL    MPV 10.5 9.4 - 12.3 FL    nRBC 0.00 0.0 - 0.2 K/uL    Differential Type AUTOMATED      Neutrophils % 64 43 - 78 %    Lymphocytes % 21 13 - 44 %    Monocytes % 9 4.0 - 12.0 %    Eosinophils % 4 0.5 - 7.8 %    Basophils % 2 0.0 - 2.0 %    Immature Granulocytes 0 0.0 - 5.0 %    Neutrophils Absolute 5.2 1.7 - 8.2 K/UL    Lymphocytes Absolute 1.7 0.5 - 4.6 K/UL    Monocytes Absolute 0.7 0.1 - 1.3 K/UL    Eosinophils Absolute 0.3 0.0 - 0.8 K/UL    Basophils Absolute 0.1 0.0 - 0.2 K/UL    Absolute Immature Granulocyte 0.0 0.0 - 0.5 K/UL   EKG 12 Lead    Collection Time: 08/07/23  2:07 PM   Result Value Ref Range    Ventricular Rate 66 BPM    Atrial Rate 66 BPM    P-R Interval 179 ms    QRS Duration 88 ms    Q-T Interval 418 ms    QTc Calculation (Bazett) 438 ms    P Axis 76 degrees    R Axis 79 degrees    T Axis 70 degrees    Diagnosis Sinus rhythm    CBC with Auto Differential    Collection Time: 08/07/23  2:12 PM   Result Value Ref Range    WBC 9.6 4.3 - 11.1 K/uL    RBC 3.36 (L) 4.05 - 5.2 M/uL    Hemoglobin 7.2 (L) 11.7 - 15.4 g/dL    Hematocrit 25.2 (L) 35.8 - 46.3 %    MCV 75.0 (L) 82.0 - 102.0

## 2023-08-07 NOTE — TELEPHONE ENCOUNTER
Office notified with STAT results - PT with pulmonary embolism   Verbal order from PCP - Pt to be sent to ED for work up.

## 2023-08-08 ENCOUNTER — APPOINTMENT (OUTPATIENT)
Dept: NON INVASIVE DIAGNOSTICS | Age: 78
DRG: 176 | End: 2023-08-08
Payer: MEDICARE

## 2023-08-08 VITALS
DIASTOLIC BLOOD PRESSURE: 67 MMHG | OXYGEN SATURATION: 98 % | HEIGHT: 62 IN | SYSTOLIC BLOOD PRESSURE: 114 MMHG | HEART RATE: 67 BPM | BODY MASS INDEX: 23.55 KG/M2 | WEIGHT: 128 LBS | TEMPERATURE: 98.6 F | RESPIRATION RATE: 16 BRPM

## 2023-08-08 PROBLEM — K40.90 RIGHT INGUINAL HERNIA: Status: RESOLVED | Noted: 2023-07-05 | Resolved: 2023-08-08

## 2023-08-08 LAB
ABO + RH BLD: NORMAL
BLD PROD TYP BPU: NORMAL
BLOOD BANK CMNT PATIENT-IMP: NORMAL
BLOOD BANK DISPENSE STATUS: NORMAL
BLOOD GROUP ANTIBODIES SERPL: NORMAL
BPU ID: NORMAL
CROSSMATCH RESULT: NORMAL
ECHO AO ASC DIAM: 2.8 CM
ECHO AO ASCENDING AORTA INDEX: 1.77 CM/M2
ECHO AO ROOT DIAM: 3 CM
ECHO AO ROOT INDEX: 1.9 CM/M2
ECHO AV AREA PEAK VELOCITY: 1.7 CM2
ECHO AV AREA VTI: 1.7 CM2
ECHO AV AREA/BSA PEAK VELOCITY: 1.1 CM2/M2
ECHO AV AREA/BSA VTI: 1.1 CM2/M2
ECHO AV MEAN GRADIENT: 7 MMHG
ECHO AV MEAN VELOCITY: 1.3 M/S
ECHO AV PEAK GRADIENT: 16 MMHG
ECHO AV PEAK VELOCITY: 2 M/S
ECHO AV VELOCITY RATIO: 0.6
ECHO AV VTI: 43.6 CM
ECHO BSA: 1.59 M2
ECHO EST RA PRESSURE: 3 MMHG
ECHO IVC EXP: 1.5 CM
ECHO LA AREA 2C: 18.1 CM2
ECHO LA AREA 4C: 18.8 CM2
ECHO LA DIAMETER INDEX: 1.96 CM/M2
ECHO LA DIAMETER: 3.1 CM
ECHO LA MAJOR AXIS: 5.7 CM
ECHO LA MINOR AXIS: 5.8 CM
ECHO LA TO AORTIC ROOT RATIO: 1.03
ECHO LA VOL 2C: 46 ML (ref 22–52)
ECHO LA VOL 4C: 49 ML (ref 22–52)
ECHO LA VOL BP: 48 ML (ref 22–52)
ECHO LA VOL/BSA BIPLANE: 30 ML/M2 (ref 16–34)
ECHO LA VOLUME INDEX A2C: 29 ML/M2 (ref 16–34)
ECHO LA VOLUME INDEX A4C: 31 ML/M2 (ref 16–34)
ECHO LV E' LATERAL VELOCITY: 11 CM/S
ECHO LV E' SEPTAL VELOCITY: 9 CM/S
ECHO LV EDV A2C: 78 ML
ECHO LV EDV A4C: 68 ML
ECHO LV EDV INDEX A4C: 43 ML/M2
ECHO LV EDV NDEX A2C: 49 ML/M2
ECHO LV EJECTION FRACTION A2C: 71 %
ECHO LV EJECTION FRACTION A4C: 67 %
ECHO LV EJECTION FRACTION BIPLANE: 67 % (ref 55–100)
ECHO LV ESV A2C: 23 ML
ECHO LV ESV A4C: 23 ML
ECHO LV ESV INDEX A2C: 15 ML/M2
ECHO LV ESV INDEX A4C: 15 ML/M2
ECHO LV FRACTIONAL SHORTENING: 40 % (ref 28–44)
ECHO LV INTERNAL DIMENSION DIASTOLE INDEX: 2.66 CM/M2
ECHO LV INTERNAL DIMENSION DIASTOLIC: 4.2 CM (ref 3.9–5.3)
ECHO LV INTERNAL DIMENSION SYSTOLIC INDEX: 1.58 CM/M2
ECHO LV INTERNAL DIMENSION SYSTOLIC: 2.5 CM
ECHO LV IVSD: 0.8 CM (ref 0.6–0.9)
ECHO LV MASS 2D: 109.8 G (ref 67–162)
ECHO LV MASS INDEX 2D: 69.5 G/M2 (ref 43–95)
ECHO LV POSTERIOR WALL DIASTOLIC: 0.9 CM (ref 0.6–0.9)
ECHO LV RELATIVE WALL THICKNESS RATIO: 0.43
ECHO LVOT AREA: 2.8 CM2
ECHO LVOT AV VTI INDEX: 0.61
ECHO LVOT DIAM: 1.9 CM
ECHO LVOT MEAN GRADIENT: 3 MMHG
ECHO LVOT PEAK GRADIENT: 5 MMHG
ECHO LVOT PEAK VELOCITY: 1.2 M/S
ECHO LVOT STROKE VOLUME INDEX: 47.5 ML/M2
ECHO LVOT SV: 75.1 ML
ECHO LVOT VTI: 26.5 CM
ECHO MV A VELOCITY: 0.98 M/S
ECHO MV AREA VTI: 1.6 CM2
ECHO MV E DECELERATION TIME (DT): 262 MS
ECHO MV E VELOCITY: 1.23 M/S
ECHO MV E/A RATIO: 1.26
ECHO MV E/E' LATERAL: 11.18
ECHO MV E/E' RATIO (AVERAGED): 12.42
ECHO MV E/E' SEPTAL: 13.67
ECHO MV LVOT VTI INDEX: 1.73
ECHO MV MAX VELOCITY: 1.3 M/S
ECHO MV MEAN GRADIENT: 3 MMHG
ECHO MV MEAN VELOCITY: 0.7 M/S
ECHO MV PEAK GRADIENT: 7 MMHG
ECHO MV VTI: 45.9 CM
ECHO PULMONARY ARTERY END DIASTOLIC PRESSURE: 5 MMHG
ECHO PV ACCELERATION TIME (AT): 169 MS
ECHO PV MAX VELOCITY: 1 M/S
ECHO PV PEAK GRADIENT: 4 MMHG
ECHO PV REGURGITANT MAX VELOCITY: 1.1 M/S
ECHO RIGHT VENTRICULAR SYSTOLIC PRESSURE (RVSP): 29 MMHG
ECHO RV BASAL DIMENSION: 3.3 CM
ECHO RV FREE WALL PEAK S': 12 CM/S
ECHO RV TAPSE: 2.6 CM (ref 1.7–?)
ECHO TV REGURGITANT MAX VELOCITY: 2.55 M/S
ECHO TV REGURGITANT PEAK GRADIENT: 26 MMHG
HCT VFR BLD AUTO: 29.5 % (ref 35.8–46.3)
HCT VFR BLD AUTO: 31.8 % (ref 35.8–46.3)
HGB BLD-MCNC: 8.6 G/DL (ref 11.7–15.4)
HGB BLD-MCNC: 9.1 G/DL (ref 11.7–15.4)
SPECIMEN EXP DATE BLD: NORMAL
UNIT DIVISION: 0

## 2023-08-08 PROCEDURE — 6360000002 HC RX W HCPCS: Performed by: NURSE PRACTITIONER

## 2023-08-08 PROCEDURE — 2580000003 HC RX 258: Performed by: NURSE PRACTITIONER

## 2023-08-08 PROCEDURE — 6370000000 HC RX 637 (ALT 250 FOR IP): Performed by: NURSE PRACTITIONER

## 2023-08-08 PROCEDURE — 85014 HEMATOCRIT: CPT

## 2023-08-08 PROCEDURE — 36415 COLL VENOUS BLD VENIPUNCTURE: CPT

## 2023-08-08 PROCEDURE — 93306 TTE W/DOPPLER COMPLETE: CPT

## 2023-08-08 PROCEDURE — 85018 HEMOGLOBIN: CPT

## 2023-08-08 PROCEDURE — 6370000000 HC RX 637 (ALT 250 FOR IP): Performed by: HOSPITALIST

## 2023-08-08 RX ORDER — OXYCODONE HYDROCHLORIDE 5 MG/1
5 TABLET ORAL ONCE
Status: COMPLETED | OUTPATIENT
Start: 2023-08-08 | End: 2023-08-08

## 2023-08-08 RX ORDER — ACETAMINOPHEN 325 MG/1
650 TABLET ORAL ONCE
Status: COMPLETED | OUTPATIENT
Start: 2023-08-08 | End: 2023-08-08

## 2023-08-08 RX ORDER — FERROUS SULFATE 325(65) MG
325 TABLET ORAL
Qty: 30 TABLET | Refills: 1 | Status: SHIPPED | OUTPATIENT
Start: 2023-08-08 | End: 2023-10-07

## 2023-08-08 RX ADMIN — OXYCODONE HYDROCHLORIDE 5 MG: 5 TABLET ORAL at 06:22

## 2023-08-08 RX ADMIN — ACETAMINOPHEN 650 MG: 325 TABLET, FILM COATED ORAL at 13:27

## 2023-08-08 RX ADMIN — SODIUM CHLORIDE 125 MG: 9 INJECTION, SOLUTION INTRAVENOUS at 10:20

## 2023-08-08 RX ADMIN — RIVAROXABAN 15 MG: 15 TABLET, FILM COATED ORAL at 08:37

## 2023-08-08 RX ADMIN — PANTOPRAZOLE SODIUM 40 MG: 40 TABLET, DELAYED RELEASE ORAL at 06:21

## 2023-08-08 ASSESSMENT — PAIN DESCRIPTION - DESCRIPTORS: DESCRIPTORS: ACHING

## 2023-08-08 ASSESSMENT — PAIN SCALES - GENERAL
PAINLEVEL_OUTOF10: 3
PAINLEVEL_OUTOF10: 7
PAINLEVEL_OUTOF10: 0

## 2023-08-08 ASSESSMENT — PAIN DESCRIPTION - LOCATION
LOCATION: HEAD
LOCATION: HEAD

## 2023-08-08 NOTE — DISCHARGE INSTRUCTIONS
Keep appt w PCP on Monday-  have her check yr hemoglobin blood level then and frequently over the next 90 days since you have anemia and on a blood thinner  Take the iron daily, it will turn yr stool black  Take the xarelto 15 mg every 12 hrs for for 21 days- then yr PCP will write you a lower once a day dose for days 22 to day 90   They PCP will repeat the CT chest to look and see if the Pes have dissolved or not - get the results before you fly to see yr grand daughter in october

## 2023-08-08 NOTE — CARE COORDINATION
08/08/23 3384   Service Assessment   Patient Orientation Alert and Oriented   Cognition Alert   History Provided By Patient   Primary [address] Family Members   Patient's Healthcare Decision Maker is: Legal Next of [facility]   PCP Verified by CM Yes   Last Visit to PCP Within last 3 months   Prior Functional Level Independent in ADLs/IADLs   Current Functional Level Independent in ADLs/IADLs   Can patient return to prior living arrangement Yes   Ability to make needs known: Good   Family able to assist with home care needs: Yes   Would you like for me to discuss the discharge plan with any other family members/significant others, and if so, who? No   Financial Resources [employer] None   Social/Functional History   Lives With Alone   Services At/After Discharge   Confirm Follow Up Transport Family   Condition of Participation: Discharge Planning   The Plan for Transition of Care is related to the following treatment goals: return home   The Patient and/or Patient Representative was provided with a Choice of Provider? Patient   The Patient and/Or Patient Representative agree with the Discharge Plan? Yes   Freedom of Choice list was provided with basic dialogue that supports the patient's individualized plan of care/goals, treatment preferences, and shares the quality data associated with the providers? Yes     Assessment completed today with patient. Patient alert and oriented. , patient has home o2 already. Patient discharging home today on Xarelto, coupon given to patient. No further CM needs.     Oli Morejon [facility], [address] [facility]

## 2023-08-08 NOTE — DISCHARGE SUMMARY
Hospitalist Discharge Summary   Admit Date:  2023  2:02 PM   DC Note date: 2023  Name:  Esther Yun   Age:  70 y.o. Sex:  female  :  1945   MRN:  [id number]   Room:  [facility]  PCP:  Xander Moroe MD    Presenting Complaint: Pulmonary Embolism     Initial Admission Diagnosis: Dyspnea on exertion [R06.09]  Bilateral pulmonary embolism (HCC) [I26.99]  Anemia, unspecified type [D64.9]  Multiple subsegmental pulmonary emboli without acute cor pulmonale (HCC) [I26.94]     Problem List for this Hospitalization (present on admission):    Principal Problem:    Pulmonary embolism, bilateral ([address])  Active Problems:    Right inguinal hernia    Anemia    Bilateral pulmonary embolism ([address])  Resolved Problems:    * No resolved hospital problems. *      Hospital Course:  Esther Yun is a 70 y.o. female with medical history of anemia and a recent rt inguinal repair 2023. Patient states procedure went fine but a few days later began feeling some shortness of breath. Went to her postop visit on  with nurse practitioner and had been experiencing some shortness of breath and dizziness prior to the appointment. Was told to follow-up with her primary care physician, which she did not do until today. CT outpatient indicated bilateral peripheral PEs which are small, without heart strain. These are new. Patient is never had DVT/PE in the past. Was told to come to the emergency room. Her treatment is complicated by her history of anemia. Patient states she been anemic her whole life at baseline between 9 - 10, hgb was 9.7 back in March. Its 7.2 here in the ER today. Xarelto 15mg was started in the emergency room for anticoagulation. Patient will require blood transfusions while we are stabilizing her anticoagulation. She feels slightly short of breath BUT NOT hypoxic. Hospitalist to admit. Pt was monitored closely over night. She was given 1u PRBc transfusion.  Hgb up to 9.1

## 2023-08-08 NOTE — PROGRESS NOTES
Patient stated feeling weak, but remembered she took medicine this AM on empty stomach. Per MAR, patient received 5mg oxycodone at 622 AM. /58, 02 99% on 2L NC, Hr 67, afebrile. This RN weaned 02 to 1L at this time.

## 2023-08-09 ENCOUNTER — TELEPHONE (OUTPATIENT)
Dept: INTERNAL MEDICINE CLINIC | Facility: CLINIC | Age: 78
End: 2023-08-09

## 2023-08-09 NOTE — TELEPHONE ENCOUNTER
Care Transitions Initial Follow Up Call    Outreach made within 2 business days of discharge: Yes    Patient: Macario Huynh Patient : 1945   MRN: [id number]  Reason for Admission: There are no discharge diagnoses documented for the most recent discharge.   Discharge Date: 23       Spoke with: TYE regarding recent hospital discharge and f/u appt with PCP    Discharge department/facility: [facility]

## 2023-08-10 ENCOUNTER — PATIENT MESSAGE (OUTPATIENT)
Dept: INTERNAL MEDICINE CLINIC | Facility: CLINIC | Age: 78
End: 2023-08-10

## 2023-08-10 NOTE — TELEPHONE ENCOUNTER
Care Transitions Initial Follow Up Call    Outreach made within 2 business days of discharge: Yes    Patient: Trino Ramirez Patient : 1945   MRN: [id number]  Reason for Admission: There are no discharge diagnoses documented for the most recent discharge. Discharge Date: 23       Spoke with: Kalpana Lucas    Discharge department/facility: [facility]    TCM Interactive Patient Contact:  Was patient able to fill all prescriptions: Yes  Was patient instructed to bring all medications to the follow-up visit: Yes  Is patient taking all medications as directed in the discharge summary? Yes  Does patient understand their discharge instructions: Yes  Does patient have questions or concerns that need addressed prior to 7-14 day follow up office visit: yes   Pt reports nausea and mild lightheaded after starting Iron supplement yesterday. She took supplement with toast this morning to try and help with nausea with little relief.   Pt to reach out to office tomorrow if she does not feel like she can continue taking until PCP f/u on 23    Scheduled appointment with PCP within 7-14 days    Follow Up  Future Appointments   Date Time Provider [address]   2023  8:45 AM MD MITCHELL Esquivel AMB   2023 48:68 AM SFO CT [address] SLICE UNIT 1 SFORCT [facility]   10/19/2023 10:00 AM MD MITCHELL Esquivel LPN

## 2023-08-10 NOTE — TELEPHONE ENCOUNTER
From: Patrick Flowers  To: Dr. Vidya Salazar: 8/10/2023 11:37 AM EDT  Subject: Lump at IV site    Hi Marcella   I noticed I have a large lump where the IV was in my right arm. Should I apply heat or ice to it. Will it ho away by itself?   Thanks,  Orquidea Mora

## 2023-08-14 ENCOUNTER — OFFICE VISIT (OUTPATIENT)
Dept: INTERNAL MEDICINE CLINIC | Facility: CLINIC | Age: 78
End: 2023-08-14

## 2023-08-14 VITALS
OXYGEN SATURATION: 99 % | BODY MASS INDEX: 23.85 KG/M2 | HEART RATE: 73 BPM | WEIGHT: 129.6 LBS | DIASTOLIC BLOOD PRESSURE: 72 MMHG | HEIGHT: 62 IN | SYSTOLIC BLOOD PRESSURE: 128 MMHG

## 2023-08-14 DIAGNOSIS — D64.9 ANEMIA, UNSPECIFIED TYPE: ICD-10-CM

## 2023-08-14 DIAGNOSIS — I26.99 OTHER ACUTE PULMONARY EMBOLISM WITHOUT ACUTE COR PULMONALE (HCC): ICD-10-CM

## 2023-08-14 DIAGNOSIS — Z09 HOSPITAL DISCHARGE FOLLOW-UP: Primary | ICD-10-CM

## 2023-08-14 LAB
BASOPHILS # BLD: 0.1 K/UL (ref 0–0.2)
BASOPHILS NFR BLD: 2 % (ref 0–2)
DIFFERENTIAL METHOD BLD: ABNORMAL
EOSINOPHIL # BLD: 0.2 K/UL (ref 0–0.8)
EOSINOPHIL NFR BLD: 4 % (ref 0.5–7.8)
ERYTHROCYTE [DISTWIDTH] IN BLOOD BY AUTOMATED COUNT: 22.3 % (ref 11.9–14.6)
HCT VFR BLD AUTO: 29 % (ref 35.8–46.3)
HGB BLD-MCNC: 8.4 G/DL (ref 11.7–15.4)
IMM GRANULOCYTES # BLD AUTO: 0 K/UL (ref 0–0.5)
IMM GRANULOCYTES NFR BLD AUTO: 1 % (ref 0–5)
LYMPHOCYTES # BLD: 1.5 K/UL (ref 0.5–4.6)
LYMPHOCYTES NFR BLD: 24 % (ref 13–44)
MCH RBC QN AUTO: 24.2 PG (ref 26.1–32.9)
MCHC RBC AUTO-ENTMCNC: 29 G/DL (ref 31.4–35)
MCV RBC AUTO: 83.6 FL (ref 82–102)
MONOCYTES # BLD: 0.5 K/UL (ref 0.1–1.3)
MONOCYTES NFR BLD: 8 % (ref 4–12)
NEUTS SEG # BLD: 3.8 K/UL (ref 1.7–8.2)
NEUTS SEG NFR BLD: 62 % (ref 43–78)
NRBC # BLD: 0 K/UL (ref 0–0.2)
PLATELET # BLD AUTO: 501 K/UL (ref 150–450)
PMV BLD AUTO: 10.8 FL (ref 9.4–12.3)
RBC # BLD AUTO: 3.47 M/UL (ref 4.05–5.2)
WBC # BLD AUTO: 6.1 K/UL (ref 4.3–11.1)

## 2023-08-14 RX ORDER — DOXYCYCLINE HYCLATE 100 MG
100 TABLET ORAL 2 TIMES DAILY
Qty: 10 TABLET | Refills: 0 | COMMUNITY
Start: 2023-08-11 | End: 2023-08-16

## 2023-08-14 RX ORDER — IRON,CARBONYL/ASCORBIC ACID 65MG-125MG
TABLET, DELAYED RELEASE (ENTERIC COATED) ORAL
Qty: 90 TABLET | Refills: 1 | Status: SHIPPED | OUTPATIENT
Start: 2023-08-14

## 2023-08-14 ASSESSMENT — ENCOUNTER SYMPTOMS
RESPIRATORY NEGATIVE: 1
GASTROINTESTINAL NEGATIVE: 1

## 2023-08-14 NOTE — PROGRESS NOTES
FOLLOW UP VISIT    Subjective:    Ardell Duane (: 1945) is a 70 y.o., female,   Chief Complaint   Patient presents with    Follow-Up from Hospital       HPI:  HPI        Admission:  2023   DC :2023      Very nice patient in for hospital follow up. Went in on 23. She was seen on  in office had EKG because of SOB and not feeling well. She had surgery two weeks ago . She had called the surgeon the week prior with sx was told to go to PCP. I ordered CBC and BMP and STAT CT - showed PE . Hb was low- went to ER and hosptial was given meds. She is feeling better having issues tolerateing iron  Past Medical History:   Diagnosis Date    Anemia        Past Surgical History:   Procedure Laterality Date    CATARACT REMOVAL Bilateral     HERNIA REPAIR Right 2023    LAPAROSCOPIC RIGHT INGUINAL HERNIA REPAIR WITH MESH performed by Tylor Escobedo MD at [facility] MAIN OR    MOUTH SURGERY         Family History   Problem Relation Age of Onset    Alcohol Abuse Mother     Stroke Mother         alcoholic/smoker    Heart Attack Father         smoker    Alcohol Abuse Brother     Cancer Brother         smoker/drinker    High Cholesterol Brother        Social History     Socioeconomic History    Marital status:      Spouse name: Not on file    Number of children: Not on file    Years of education: Not on file    Highest education level: Not on file   Occupational History    Not on file   Tobacco Use    Smoking status: Former     Packs/day: 0.25     Years: 27.00     Pack years: 6.75     Types: Cigarettes     Start date: 1970     Quit date: 1997     Years since quittin.6    Smokeless tobacco: Never   Vaping Use    Vaping Use: Never used   Substance and Sexual Activity    Alcohol use:  Yes     Alcohol/week: 3.0 standard drinks     Types: 3 Glasses of wine per week     Comment: occasional    Drug use: Never    Sexual activity: Not Currently   Other Topics Concern    Not on file

## 2023-08-29 ENCOUNTER — PATIENT MESSAGE (OUTPATIENT)
Dept: INTERNAL MEDICINE CLINIC | Facility: CLINIC | Age: 78
End: 2023-08-29

## 2023-08-29 NOTE — TELEPHONE ENCOUNTER
From: Luis Isaac  To: Dr. Jaye Morse: 8/29/2023 9:31 AM EDT  Subject: Xarelto    Good morning,    It looks like prescription for Xarelto was sent in for 15mg 2x a day. According to 8/07 discharge instructions by Lamar Willard, it should be 20mg 1x a day for days 22-90. Please check Eleni Spivey notes and change my prescription. Please advise me if this is correct.   thanks,  verónica rothman

## 2023-09-07 NOTE — PROGRESS NOTES
[facility] Hematology and Oncology: New Patient - Consultation    Chief Complaint   Patient presents with    New Patient     Referral Diagnosis: Other acute pulmonary embolism without acute cor pulmonale; Anemia, unspecified type  Referring Provider: Ming Aranda MD  Family History of Cancer/ Hematology Disorders: Brother with unspecified type of cancer  Presenting Symptoms: Shortness of breath and heart palpitations    History of Present Illness:  Ms. Niles Otoole is a 70 y.o. female who presents today in referral from Dr. Abraham Hall for consultation regarding PE. The past medical history is per below. Today, patient is here for consultation. She was referred for recent acute pulmonary embolism after surgery without cor pulmonale and also on Xarelto, with anemia. Further work-up will be needed. She will be traveling to [place] and per Dr. Ang Maldonado is scheduled prior to her trip. We will refill her Xarelto. Due to patient's history of chronic anemia with hemoglobin ranging from 9-9.8 all her life per patient, we will proceed workup to include hemoglobin electrophoresis. ,  We will also check CBC, CMP, and iron studies B12, vitamin D, viral serologies due to her recent transfusion, hapto, LDH SAGAR. She does have dark stools being on iron every day. We may need to alter her schedule depending on the results from today's blood work. She has not had a colonoscopy in a long time but understands that she will most likely not be able to come off Xarelto with acute PE until repeat CT scan. No hematuria or other bleeding noted.     Chronological Events:  3/6/23: Pt established primary care with Dr. Abraham Hall with chief complaint of lump in her groin since 2017  -Routine labs included CBC which was significant for RBC 3.37, HGB 9.7, HCT 30.7 (Epic/Labs); -Referral placed to GI -CT A/P ordered  3/14/23: Telemedicine f/u with PCP   -MD notes hx of anemia and B12 deficiency requiring oral iron and B12 in the past; -Most recent

## 2023-09-12 NOTE — PROGRESS NOTES
NEW PATIENT INTAKE    Referral Diagnosis: Other acute pulmonary embolism without acute cor pulmonale; Anemia, unspecified type    Referring Provider: Viann Holstein, MD    Primary Care Provider: Viann Holstein, MD    Family History of Cancer/ Hematology Disorders: Brother with unspecified type of cancer    Presenting Symptoms: Shortness of breath and heart palpitations    Chronological History of Pertinent Events: Ms. Aida Rey is a 72-year-old white female with a long standing history of KAYA with baseline HGB 9 -10.     3/6/23: Pt established primary care with Dr. Alberto Stanton with chief complaint of lump in her groin since 2017  -Routine labs included CBC which was significant for RBC 3.37, HGB 9.7, HCT 30.7 (Epic/Labs)  -Referral placed to GI   -CT A/P ordered    3/14/23: Telemedicine f/u with PCP   -MD notes hx of anemia and B12 deficiency requiring oral iron and B12 in the past  -Most recent colonoscopy over 12 years ago    3/15/23: Additional labs showed iron and vitamin B-12 WNL at 78 and 337 respectively (Epic/Labs)  -Pt advised to start OTC B12 supplement    3/22/23: CT A/P showed no acute pathology within the abdomen or pelvis; fat-containing right inguinal hernia; a 1.0 cm indeterminate density exophytic right lower pole renal lesion, indeterminate but possibly reflecting complexes; a 0.8 cm cystic lesion in the pancreatic head, stable from October 2017 (consider follow-up imaging in 1 to 2 years to ensure stability); and colonic diverticulosis without evidence of diverticulitis. Renal ultrasound was recommended for further evaluation of right lower pole renal lesion (Epic/Imaging)    4/4/23: US retroperitoneal limited showed lower pole right renal lesion corresponding to a simple cyst. No further follow-up required. Kidneys are otherwise unremarkable.  Possible mild fatty liver infiltration (Epic/Imaging)    7/21/23: Pt underwent laparoscopic right inguinal hernia repair with mesh with Dr. Alesha Miles     8/7/23: Pt

## 2023-09-14 ENCOUNTER — HOSPITAL ENCOUNTER (OUTPATIENT)
Dept: LAB | Age: 78
Discharge: HOME | End: 2023-09-14
Payer: MEDICARE

## 2023-09-14 ENCOUNTER — OFFICE VISIT (OUTPATIENT)
Dept: ONCOLOGY | Age: 78
End: 2023-09-14
Payer: MEDICARE

## 2023-09-14 VITALS
DIASTOLIC BLOOD PRESSURE: 73 MMHG | HEART RATE: 65 BPM | RESPIRATION RATE: 18 BRPM | OXYGEN SATURATION: 99 % | SYSTOLIC BLOOD PRESSURE: 117 MMHG | TEMPERATURE: 98.1 F | BODY MASS INDEX: 23.28 KG/M2 | WEIGHT: 126.5 LBS | HEIGHT: 62 IN

## 2023-09-14 DIAGNOSIS — D64.9 ANEMIA, UNSPECIFIED TYPE: ICD-10-CM

## 2023-09-14 DIAGNOSIS — I26.99 PULMONARY EMBOLISM, BILATERAL (HCC): ICD-10-CM

## 2023-09-14 DIAGNOSIS — D50.9 IRON DEFICIENCY ANEMIA, UNSPECIFIED IRON DEFICIENCY ANEMIA TYPE: ICD-10-CM

## 2023-09-14 DIAGNOSIS — I26.99 PULMONARY EMBOLISM, BILATERAL (HCC): Primary | ICD-10-CM

## 2023-09-14 DIAGNOSIS — R53.83 OTHER FATIGUE: ICD-10-CM

## 2023-09-14 LAB
25(OH)D3 SERPL-MCNC: 18.2 NG/ML (ref 30–100)
ABO + RH BLD: NORMAL
ALBUMIN SERPL-MCNC: 3.7 G/DL (ref 3.2–4.6)
ALBUMIN/GLOB SERPL: 1 (ref 0.4–1.6)
ALP SERPL-CCNC: 106 U/L (ref 50–136)
ALT SERPL-CCNC: 21 U/L (ref 12–65)
ANION GAP SERPL CALC-SCNC: 4 MMOL/L (ref 2–11)
AST SERPL-CCNC: 17 U/L (ref 15–37)
BASOPHILS # BLD: 0.1 K/UL (ref 0–0.2)
BASOPHILS NFR BLD: 2 % (ref 0–2)
BILIRUB SERPL-MCNC: 0.2 MG/DL (ref 0.2–1.1)
BLOOD GROUP ANTIBODIES SERPL: NORMAL
BUN SERPL-MCNC: 14 MG/DL (ref 8–23)
CALCIUM SERPL-MCNC: 9 MG/DL (ref 8.3–10.4)
CHLORIDE SERPL-SCNC: 109 MMOL/L (ref 101–110)
CO2 SERPL-SCNC: 26 MMOL/L (ref 21–32)
CREAT SERPL-MCNC: 0.8 MG/DL (ref 0.6–1)
DIFFERENTIAL METHOD BLD: ABNORMAL
EOSINOPHIL # BLD: 0.2 K/UL (ref 0–0.8)
EOSINOPHIL NFR BLD: 3 % (ref 0.5–7.8)
ERYTHROCYTE [DISTWIDTH] IN BLOOD BY AUTOMATED COUNT: 22.4 % (ref 11.9–14.6)
FERRITIN SERPL-MCNC: 47 NG/ML (ref 8–388)
FOLATE SERPL-MCNC: 59.4 NG/ML (ref 3.1–17.5)
GLOBULIN SER CALC-MCNC: 3.7 G/DL (ref 2.8–4.5)
GLUCOSE SERPL-MCNC: 105 MG/DL (ref 65–100)
HAPTOGLOB SERPL-MCNC: 186 MG/DL (ref 30–200)
HAV IGM SER QL: NONREACTIVE
HBV CORE IGM SER QL: NONREACTIVE
HBV SURFACE AG SER QL: NONREACTIVE
HCT VFR BLD AUTO: 32.2 % (ref 35.8–46.3)
HCV AB SER QL: NONREACTIVE
HGB BLD-MCNC: 9.5 G/DL (ref 11.7–15.4)
HGB RETIC QN AUTO: 33 PG (ref 29–35)
HIV 1+2 AB+HIV1 P24 AG SERPL QL IA: NONREACTIVE
HIV 1/2 RESULT COMMENT: NORMAL
IMM GRANULOCYTES # BLD AUTO: 0 K/UL (ref 0–0.5)
IMM GRANULOCYTES NFR BLD AUTO: 0 % (ref 0–5)
IMM RETICS NFR: 22.9 % (ref 3–15.9)
IRON SATN MFR SERPL: 7 %
IRON SERPL-MCNC: 24 UG/DL (ref 35–150)
LDH SERPL L TO P-CCNC: 175 U/L (ref 110–210)
LYMPHOCYTES # BLD: 1.7 K/UL (ref 0.5–4.6)
LYMPHOCYTES NFR BLD: 26 % (ref 13–44)
MCH RBC QN AUTO: 27.6 PG (ref 26.1–32.9)
MCHC RBC AUTO-ENTMCNC: 29.5 G/DL (ref 31.4–35)
MCV RBC AUTO: 93.6 FL (ref 82–102)
MONOCYTES # BLD: 0.6 K/UL (ref 0.1–1.3)
MONOCYTES NFR BLD: 9 % (ref 4–12)
NEUTS SEG # BLD: 4 K/UL (ref 1.7–8.2)
NEUTS SEG NFR BLD: 61 % (ref 43–78)
NRBC # BLD: 0 K/UL (ref 0–0.2)
PLATELET # BLD AUTO: 413 K/UL (ref 150–450)
PMV BLD AUTO: 10.7 FL (ref 9.4–12.3)
POTASSIUM SERPL-SCNC: 3.7 MMOL/L (ref 3.5–5.1)
PROT SERPL-MCNC: 7.4 G/DL (ref 6.3–8.2)
RBC # BLD AUTO: 3.44 M/UL (ref 4.05–5.2)
RETICS # AUTO: 0.15 M/UL (ref 0.03–0.1)
RETICS/RBC NFR AUTO: 4.4 % (ref 0.3–2)
SODIUM SERPL-SCNC: 139 MMOL/L (ref 133–143)
SPECIMEN EXP DATE BLD: NORMAL
TIBC SERPL-MCNC: 367 UG/DL (ref 250–450)
VIT B12 SERPL-MCNC: 480 PG/ML (ref 193–986)
WBC # BLD AUTO: 6.5 K/UL (ref 4.3–11.1)

## 2023-09-14 PROCEDURE — 1123F ACP DISCUSS/DSCN MKR DOCD: CPT | Performed by: INTERNAL MEDICINE

## 2023-09-14 PROCEDURE — 82746 ASSAY OF FOLIC ACID SERUM: CPT

## 2023-09-14 PROCEDURE — 85025 COMPLETE CBC W/AUTO DIFF WBC: CPT

## 2023-09-14 PROCEDURE — 99205 OFFICE O/P NEW HI 60 MIN: CPT | Performed by: INTERNAL MEDICINE

## 2023-09-14 PROCEDURE — 86850 RBC ANTIBODY SCREEN: CPT

## 2023-09-14 PROCEDURE — 82728 ASSAY OF FERRITIN: CPT

## 2023-09-14 PROCEDURE — 86038 ANTINUCLEAR ANTIBODIES: CPT

## 2023-09-14 PROCEDURE — 1090F PRES/ABSN URINE INCON ASSESS: CPT | Performed by: INTERNAL MEDICINE

## 2023-09-14 PROCEDURE — 86900 BLOOD TYPING SEROLOGIC ABO: CPT

## 2023-09-14 PROCEDURE — 80074 ACUTE HEPATITIS PANEL: CPT

## 2023-09-14 PROCEDURE — 83020 HEMOGLOBIN ELECTROPHORESIS: CPT

## 2023-09-14 PROCEDURE — G8420 CALC BMI NORM PARAMETERS: HCPCS | Performed by: INTERNAL MEDICINE

## 2023-09-14 PROCEDURE — 83010 ASSAY OF HAPTOGLOBIN QUANT: CPT

## 2023-09-14 PROCEDURE — G8399 PT W/DXA RESULTS DOCUMENT: HCPCS | Performed by: INTERNAL MEDICINE

## 2023-09-14 PROCEDURE — 82306 VITAMIN D 25 HYDROXY: CPT

## 2023-09-14 PROCEDURE — G8427 DOCREV CUR MEDS BY ELIG CLIN: HCPCS | Performed by: INTERNAL MEDICINE

## 2023-09-14 PROCEDURE — 85046 RETICYTE/HGB CONCENTRATE: CPT

## 2023-09-14 PROCEDURE — 80053 COMPREHEN METABOLIC PANEL: CPT

## 2023-09-14 PROCEDURE — 83615 LACTATE (LD) (LDH) ENZYME: CPT

## 2023-09-14 PROCEDURE — 86901 BLOOD TYPING SEROLOGIC RH(D): CPT

## 2023-09-14 PROCEDURE — 83540 ASSAY OF IRON: CPT

## 2023-09-14 PROCEDURE — 82607 VITAMIN B-12: CPT

## 2023-09-14 PROCEDURE — 87389 HIV-1 AG W/HIV-1&-2 AB AG IA: CPT

## 2023-09-14 PROCEDURE — 1036F TOBACCO NON-USER: CPT | Performed by: INTERNAL MEDICINE

## 2023-09-14 PROCEDURE — 83550 IRON BINDING TEST: CPT

## 2023-09-14 PROCEDURE — 36415 COLL VENOUS BLD VENIPUNCTURE: CPT

## 2023-09-14 ASSESSMENT — PATIENT HEALTH QUESTIONNAIRE - PHQ9
SUM OF ALL RESPONSES TO PHQ QUESTIONS 1-9: 0
1. LITTLE INTEREST OR PLEASURE IN DOING THINGS: 0
SUM OF ALL RESPONSES TO PHQ9 QUESTIONS 1 & 2: 0
SUM OF ALL RESPONSES TO PHQ QUESTIONS 1-9: 0
2. FEELING DOWN, DEPRESSED OR HOPELESS: 0

## 2023-09-14 NOTE — PATIENT INSTRUCTIONS
Patient Instructions from Today's Visit    Reason for Visit:  New patient visit for history of a Pulmonary Embolism (PE) - Aug 2023  Currently on Xarelto     Hx: iron deficiency anemia, fatigue  On oral ion nightly (~1 month)    Plan:  You had a blood clot in your lungs (pulmonary embolism) in August and were started on anticoagulation called Xarelto. That is a blood thinner and the goal is - to thin your blood and reduce the risk of another clot forming. It does not disolve the blood clot - your body does that on its own overtime. Continue Xarelto 20mg by mouth daily. We will send you a new rx with refills    You also have had iron deficiency anemia and are on oral iron. Continue taking daily as you are doing for now. If your levels are really good, we will decrease you to every other day. Wait for instruction from our office     We will check labs today and let you know if there are any actionable results today. Follow Up:  -lab apt today  -follow up with Dr Polly Santos 2-2.5 months     Recent Lab Results:  Labs after the visit today     Treatment Summary has been discussed and given to patient: NA        -------------------------------------------------------------------------------------------------------------------  Please call our office at [phone] if you have any  of the following symptoms:   Fever of 100.5 or greater  Chills  Shortness of breath  Swelling or pain in one leg    After office hours an answering service is available and will contact a provider for emergencies or if you are experiencing any of the above symptoms. Patient has My Chart. My Chart log in information explained on the after visit summary printout at the [address] desk.

## 2023-09-15 LAB — PATH REV BLD -IMP: NORMAL

## 2023-09-16 LAB — ANA SER QL: NEGATIVE

## 2023-09-18 LAB
HGB A MFR BLD: 98 % (ref 96.4–98.8)
HGB A2 MFR BLD COLUMN CHROM: 2 % (ref 1.8–3.2)
HGB F MFR BLD: 0 % (ref 0–2)
HGB FRACT BLD-IMP: NORMAL
HGB S MFR BLD: 0 %

## 2023-09-19 PROBLEM — R53.83 OTHER FATIGUE: Status: ACTIVE | Noted: 2023-09-19

## 2023-09-19 ASSESSMENT — ENCOUNTER SYMPTOMS
WHEEZING: 0
SCLERAL ICTERUS: 0
VOICE CHANGE: 0
SORE THROAT: 0
BLOOD IN STOOL: 0
TROUBLE SWALLOWING: 0
ABDOMINAL DISTENTION: 0
SHORTNESS OF BREATH: 0
ABDOMINAL PAIN: 0
CHEST TIGHTNESS: 0
HEMOPTYSIS: 0
DIARRHEA: 0
VOMITING: 0
NAUSEA: 0
CONSTIPATION: 0

## 2023-09-20 RX ORDER — SODIUM CHLORIDE 0.9 % (FLUSH) 0.9 %
5-40 SYRINGE (ML) INJECTION PRN
OUTPATIENT
Start: 2023-09-26

## 2023-09-20 RX ORDER — ACETAMINOPHEN 325 MG/1
650 TABLET ORAL
OUTPATIENT
Start: 2023-09-26

## 2023-09-20 RX ORDER — SODIUM CHLORIDE 9 MG/ML
5-250 INJECTION, SOLUTION INTRAVENOUS PRN
OUTPATIENT
Start: 2023-09-26

## 2023-09-20 RX ORDER — ONDANSETRON 2 MG/ML
8 INJECTION INTRAMUSCULAR; INTRAVENOUS
OUTPATIENT
Start: 2023-09-26

## 2023-09-20 RX ORDER — SODIUM CHLORIDE 9 MG/ML
INJECTION, SOLUTION INTRAVENOUS CONTINUOUS
OUTPATIENT
Start: 2023-09-26

## 2023-09-20 RX ORDER — EPINEPHRINE 1 MG/ML
0.3 INJECTION, SOLUTION, CONCENTRATE INTRAVENOUS PRN
OUTPATIENT
Start: 2023-09-26

## 2023-09-20 RX ORDER — FAMOTIDINE 10 MG/ML
20 INJECTION, SOLUTION INTRAVENOUS
OUTPATIENT
Start: 2023-09-26

## 2023-09-20 RX ORDER — HEPARIN SODIUM (PORCINE) LOCK FLUSH IV SOLN 100 UNIT/ML 100 UNIT/ML
500 SOLUTION INTRAVENOUS PRN
OUTPATIENT
Start: 2023-09-26

## 2023-09-20 RX ORDER — DIPHENHYDRAMINE HYDROCHLORIDE 50 MG/ML
50 INJECTION INTRAMUSCULAR; INTRAVENOUS
OUTPATIENT
Start: 2023-09-26

## 2023-09-20 RX ORDER — ALBUTEROL SULFATE 90 UG/1
4 AEROSOL, METERED RESPIRATORY (INHALATION) PRN
OUTPATIENT
Start: 2023-09-26

## 2023-09-27 RX ORDER — FAMOTIDINE 10 MG/ML
20 INJECTION, SOLUTION INTRAVENOUS
OUTPATIENT
Start: 2023-10-04

## 2023-09-27 RX ORDER — SODIUM CHLORIDE 9 MG/ML
INJECTION, SOLUTION INTRAVENOUS CONTINUOUS
OUTPATIENT
Start: 2023-10-04

## 2023-09-27 RX ORDER — ACETAMINOPHEN 325 MG/1
650 TABLET ORAL
OUTPATIENT
Start: 2023-10-04

## 2023-09-27 RX ORDER — ONDANSETRON 2 MG/ML
8 INJECTION INTRAMUSCULAR; INTRAVENOUS
OUTPATIENT
Start: 2023-10-04

## 2023-09-27 RX ORDER — SODIUM CHLORIDE 9 MG/ML
5-250 INJECTION, SOLUTION INTRAVENOUS PRN
OUTPATIENT
Start: 2023-10-04

## 2023-09-27 RX ORDER — EPINEPHRINE 1 MG/ML
0.3 INJECTION, SOLUTION, CONCENTRATE INTRAVENOUS PRN
OUTPATIENT
Start: 2023-10-04

## 2023-09-27 RX ORDER — SODIUM CHLORIDE 0.9 % (FLUSH) 0.9 %
5-40 SYRINGE (ML) INJECTION PRN
OUTPATIENT
Start: 2023-10-04

## 2023-09-27 RX ORDER — ALBUTEROL SULFATE 90 UG/1
4 AEROSOL, METERED RESPIRATORY (INHALATION) PRN
OUTPATIENT
Start: 2023-10-04

## 2023-09-27 RX ORDER — HEPARIN SODIUM (PORCINE) LOCK FLUSH IV SOLN 100 UNIT/ML 100 UNIT/ML
500 SOLUTION INTRAVENOUS PRN
OUTPATIENT
Start: 2023-10-04

## 2023-09-27 RX ORDER — DIPHENHYDRAMINE HYDROCHLORIDE 50 MG/ML
50 INJECTION INTRAMUSCULAR; INTRAVENOUS
OUTPATIENT
Start: 2023-10-04

## 2023-09-28 ENCOUNTER — HOSPITAL ENCOUNTER (OUTPATIENT)
Dept: CT IMAGING | Age: 78
Discharge: HOME | End: 2023-10-01

## 2023-09-28 DIAGNOSIS — K86.2 PANCREAS CYST: ICD-10-CM

## 2023-10-09 ENCOUNTER — TELEMEDICINE (OUTPATIENT)
Dept: INTERNAL MEDICINE CLINIC | Facility: CLINIC | Age: 78
End: 2023-10-09
Payer: MEDICARE

## 2023-10-09 DIAGNOSIS — R10.84 GENERALIZED ABDOMINAL PAIN: Primary | ICD-10-CM

## 2023-10-09 PROCEDURE — G8427 DOCREV CUR MEDS BY ELIG CLIN: HCPCS | Performed by: INTERNAL MEDICINE

## 2023-10-09 PROCEDURE — 1090F PRES/ABSN URINE INCON ASSESS: CPT | Performed by: INTERNAL MEDICINE

## 2023-10-09 PROCEDURE — G8399 PT W/DXA RESULTS DOCUMENT: HCPCS | Performed by: INTERNAL MEDICINE

## 2023-10-09 PROCEDURE — 1123F ACP DISCUSS/DSCN MKR DOCD: CPT | Performed by: INTERNAL MEDICINE

## 2023-10-09 PROCEDURE — G8420 CALC BMI NORM PARAMETERS: HCPCS | Performed by: INTERNAL MEDICINE

## 2023-10-09 PROCEDURE — 99214 OFFICE O/P EST MOD 30 MIN: CPT | Performed by: INTERNAL MEDICINE

## 2023-10-09 PROCEDURE — 1036F TOBACCO NON-USER: CPT | Performed by: INTERNAL MEDICINE

## 2023-10-09 PROCEDURE — G8484 FLU IMMUNIZE NO ADMIN: HCPCS | Performed by: INTERNAL MEDICINE

## 2023-10-09 RX ORDER — OMEPRAZOLE 40 MG/1
40 CAPSULE, DELAYED RELEASE ORAL
Qty: 30 CAPSULE | Refills: 0 | Status: SHIPPED | OUTPATIENT
Start: 2023-10-09

## 2023-10-09 RX ORDER — DICYCLOMINE HYDROCHLORIDE 10 MG/1
10 CAPSULE ORAL 4 TIMES DAILY
Qty: 120 CAPSULE | Refills: 0 | Status: SHIPPED | OUTPATIENT
Start: 2023-10-09

## 2023-10-09 NOTE — PROGRESS NOTES
Alcohol/week: 3.0 standard drinks of alcohol     Types: 3 Glasses of wine per week     Comment: occasional    Drug use: Never    Sexual activity: Not Currently   Other Topics Concern    Not on file   Social History Narrative    Not on file     Social Determinants of Health     Financial Resource Strain: Low Risk  (3/6/2023)    Overall Financial Resource Strain (CARDIA)     Difficulty of Paying Living Expenses: Not very hard   Food Insecurity: No Food Insecurity (3/6/2023)    Hunger Vital Sign     Worried About Running Out of Food in the Last Year: Never true     Ran Out of Food in the Last Year: Never true   Transportation Needs: Unknown (3/6/2023)    PRAPARE - Transportation     Lack of Transportation (Medical): Not on file     Lack of Transportation (Non-Medical): No   Physical Activity: Not on file   Stress: Not on file   Social Connections: Not on file   Intimate Partner Violence: Not on file   Housing Stability: Unknown (3/6/2023)    Housing Stability Vital Sign     Unable to Pay for Housing in the Last Year: Not on file     Number of Places Lived in the Last Year: Not on file     Unstable Housing in the Last Year: No       Current Outpatient Medications   Medication Sig Dispense Refill    rivaroxaban (XARELTO) 20 MG TABS tablet Take 1 tablet by mouth daily (with breakfast) 30 tablet 4    iron-vitamin C (VITRON-C)  MG TABS  mg daily 90 tablet 1    COLLAGEN PO Take by mouth daily powder      Cholecalciferol (VITAMIN D-3 PO) Take by mouth daily      Probiotic Product (PROBIOTIC DAILY PO) Take by mouth daily      Ascorbic Acid (VITAMIN C) 250 MG tablet Take 1 tablet by mouth daily      Multiple Vitamin (MULTIVITAMIN ADULT PO) Take by mouth daily       No current facility-administered medications for this visit. Allergies as of 10/09/2023    (No Known Allergies)       Review of Systems    Objective: There were no vitals taken for this visit.     Physical Exam    No results found for this visit

## 2023-10-12 ENCOUNTER — HOSPITAL ENCOUNTER (OUTPATIENT)
Dept: ULTRASOUND IMAGING | Age: 78
Discharge: HOME | End: 2023-10-15
Attending: INTERNAL MEDICINE

## 2023-10-12 DIAGNOSIS — R10.84 GENERALIZED ABDOMINAL PAIN: ICD-10-CM

## 2023-10-13 NOTE — RESULT ENCOUNTER NOTE
Called and notified patient of the result note of the abdomen ultrasound per Dr. Jerica De La Cruz. Patient verbalized understanding.

## 2023-10-16 ASSESSMENT — PATIENT HEALTH QUESTIONNAIRE - PHQ9
1. LITTLE INTEREST OR PLEASURE IN DOING THINGS: NOT AT ALL
SUM OF ALL RESPONSES TO PHQ9 QUESTIONS 1 & 2: 0
SUM OF ALL RESPONSES TO PHQ QUESTIONS 1-9: 0
2. FEELING DOWN, DEPRESSED OR HOPELESS: NOT AT ALL
SUM OF ALL RESPONSES TO PHQ QUESTIONS 1-9: 0
1. LITTLE INTEREST OR PLEASURE IN DOING THINGS: 0
SUM OF ALL RESPONSES TO PHQ QUESTIONS 1-9: 0
SUM OF ALL RESPONSES TO PHQ QUESTIONS 1-9: 0
SUM OF ALL RESPONSES TO PHQ9 QUESTIONS 1 & 2: 0
2. FEELING DOWN, DEPRESSED OR HOPELESS: 0

## 2023-10-17 ENCOUNTER — HOSPITAL ENCOUNTER (OUTPATIENT)
Dept: CT IMAGING | Age: 78
Discharge: HOME | End: 2023-10-20
Attending: INTERNAL MEDICINE

## 2023-10-17 DIAGNOSIS — I26.99 OTHER ACUTE PULMONARY EMBOLISM WITHOUT ACUTE COR PULMONALE (HCC): ICD-10-CM

## 2023-10-17 DIAGNOSIS — R91.1 PULMONARY NODULE: Primary | ICD-10-CM

## 2023-10-19 ENCOUNTER — OFFICE VISIT (OUTPATIENT)
Dept: INTERNAL MEDICINE CLINIC | Facility: CLINIC | Age: 78
End: 2023-10-19
Payer: MEDICARE

## 2023-10-19 VITALS
SYSTOLIC BLOOD PRESSURE: 116 MMHG | DIASTOLIC BLOOD PRESSURE: 72 MMHG | RESPIRATION RATE: 16 BRPM | BODY MASS INDEX: 23.19 KG/M2 | HEIGHT: 62 IN | WEIGHT: 126 LBS

## 2023-10-19 DIAGNOSIS — R19.7 DIARRHEA, UNSPECIFIED TYPE: Primary | ICD-10-CM

## 2023-10-19 DIAGNOSIS — Z00.00 MEDICARE ANNUAL WELLNESS VISIT, SUBSEQUENT: ICD-10-CM

## 2023-10-19 PROCEDURE — 99213 OFFICE O/P EST LOW 20 MIN: CPT | Performed by: INTERNAL MEDICINE

## 2023-10-19 PROCEDURE — 1123F ACP DISCUSS/DSCN MKR DOCD: CPT | Performed by: INTERNAL MEDICINE

## 2023-10-19 PROCEDURE — G8420 CALC BMI NORM PARAMETERS: HCPCS | Performed by: INTERNAL MEDICINE

## 2023-10-19 PROCEDURE — 1090F PRES/ABSN URINE INCON ASSESS: CPT | Performed by: INTERNAL MEDICINE

## 2023-10-19 PROCEDURE — G0439 PPPS, SUBSEQ VISIT: HCPCS | Performed by: INTERNAL MEDICINE

## 2023-10-19 PROCEDURE — G8399 PT W/DXA RESULTS DOCUMENT: HCPCS | Performed by: INTERNAL MEDICINE

## 2023-10-19 PROCEDURE — G8484 FLU IMMUNIZE NO ADMIN: HCPCS | Performed by: INTERNAL MEDICINE

## 2023-10-19 PROCEDURE — 1036F TOBACCO NON-USER: CPT | Performed by: INTERNAL MEDICINE

## 2023-10-19 PROCEDURE — G8427 DOCREV CUR MEDS BY ELIG CLIN: HCPCS | Performed by: INTERNAL MEDICINE

## 2023-10-19 ASSESSMENT — ENCOUNTER SYMPTOMS
RESPIRATORY NEGATIVE: 1
GASTROINTESTINAL NEGATIVE: 1

## 2023-10-23 ENCOUNTER — TELEMEDICINE (OUTPATIENT)
Dept: INTERNAL MEDICINE CLINIC | Facility: CLINIC | Age: 78
End: 2023-10-23
Payer: MEDICARE

## 2023-10-23 ENCOUNTER — TELEPHONE (OUTPATIENT)
Dept: INTERNAL MEDICINE CLINIC | Facility: CLINIC | Age: 78
End: 2023-10-23

## 2023-10-23 DIAGNOSIS — R53.83 OTHER FATIGUE: ICD-10-CM

## 2023-10-23 DIAGNOSIS — D50.9 IRON DEFICIENCY ANEMIA, UNSPECIFIED IRON DEFICIENCY ANEMIA TYPE: ICD-10-CM

## 2023-10-23 DIAGNOSIS — D64.9 ANEMIA, UNSPECIFIED TYPE: ICD-10-CM

## 2023-10-23 DIAGNOSIS — I26.99 PULMONARY EMBOLISM, BILATERAL (HCC): ICD-10-CM

## 2023-10-23 DIAGNOSIS — E03.9 HYPOTHYROIDISM, UNSPECIFIED TYPE: Primary | ICD-10-CM

## 2023-10-23 PROCEDURE — 1123F ACP DISCUSS/DSCN MKR DOCD: CPT | Performed by: INTERNAL MEDICINE

## 2023-10-23 PROCEDURE — 1036F TOBACCO NON-USER: CPT | Performed by: INTERNAL MEDICINE

## 2023-10-23 PROCEDURE — G8427 DOCREV CUR MEDS BY ELIG CLIN: HCPCS | Performed by: INTERNAL MEDICINE

## 2023-10-23 PROCEDURE — G8484 FLU IMMUNIZE NO ADMIN: HCPCS | Performed by: INTERNAL MEDICINE

## 2023-10-23 PROCEDURE — G8420 CALC BMI NORM PARAMETERS: HCPCS | Performed by: INTERNAL MEDICINE

## 2023-10-23 PROCEDURE — 1090F PRES/ABSN URINE INCON ASSESS: CPT | Performed by: INTERNAL MEDICINE

## 2023-10-23 PROCEDURE — 99214 OFFICE O/P EST MOD 30 MIN: CPT | Performed by: INTERNAL MEDICINE

## 2023-10-23 PROCEDURE — G8399 PT W/DXA RESULTS DOCUMENT: HCPCS | Performed by: INTERNAL MEDICINE

## 2023-10-23 RX ORDER — LEVOTHYROXINE SODIUM 0.05 MG/1
50 TABLET ORAL DAILY
Qty: 30 TABLET | Refills: 1 | Status: SHIPPED | OUTPATIENT
Start: 2023-10-23

## 2023-10-23 ASSESSMENT — ENCOUNTER SYMPTOMS
GASTROINTESTINAL NEGATIVE: 1
RESPIRATORY NEGATIVE: 1

## 2023-10-23 NOTE — PROGRESS NOTES
Social Determinants of Health     Financial Resource Strain: Low Risk  (3/6/2023)    Overall Financial Resource Strain (CARDIA)     Difficulty of Paying Living Expenses: Not very hard   Food Insecurity: No Food Insecurity (3/6/2023)    Hunger Vital Sign     Worried About Running Out of Food in the Last Year: Never true     Ran Out of Food in the Last Year: Never true   Transportation Needs: Unknown (3/6/2023)    PRAPARE - Transportation     Lack of Transportation (Medical): Not on file     Lack of Transportation (Non-Medical): No   Physical Activity: Sufficiently Active (10/19/2023)    Exercise Vital Sign     Days of Exercise per Week: 5 days     Minutes of Exercise per Session: 40 min   Stress: Not on file   Social Connections: Not on file   Intimate Partner Violence: Not on file   Housing Stability: Unknown (3/6/2023)    Housing Stability Vital Sign     Unable to Pay for Housing in the Last Year: Not on file     Number of [address] in the Last Year: Not on file     Unstable Housing in the Last Year: No       Current Outpatient Medications   Medication Sig Dispense Refill    levothyroxine (SYNTHROID) 50 MCG tablet Take 1 tablet by mouth daily 30 tablet 1    omeprazole (PRILOSEC) 40 MG delayed release capsule Take 1 capsule by mouth every morning (before breakfast) 30 capsule 0    dicyclomine (BENTYL) 10 MG capsule Take 1 capsule by mouth 4 times daily 120 capsule 0    rivaroxaban (XARELTO) 20 MG TABS tablet Take 1 tablet by mouth daily (with breakfast) 30 tablet 4    iron-vitamin C (VITRON-C)  MG TABS  mg daily 90 tablet 1    COLLAGEN PO Take by mouth daily powder      Cholecalciferol (VITAMIN D-3 PO) Take by mouth daily      Probiotic Product (PROBIOTIC DAILY PO) Take by mouth daily      Ascorbic Acid (VITAMIN C) 250 MG tablet Take 1 tablet by mouth daily      Multiple Vitamin (MULTIVITAMIN ADULT PO) Take by mouth daily       No current facility-administered medications for this visit.

## 2023-10-23 NOTE — TELEPHONE ENCOUNTER
----- Message from Karri James MD sent at 10/20/2023  8:37 AM EDT -----  Please have patient set up v/v Monday to discuss elevated thyroid function - her anemia is stable and improved from last time

## 2023-11-05 DIAGNOSIS — R10.84 GENERALIZED ABDOMINAL PAIN: ICD-10-CM

## 2023-11-06 RX ORDER — DICYCLOMINE HYDROCHLORIDE 10 MG/1
10 CAPSULE ORAL 4 TIMES DAILY
Qty: 120 CAPSULE | Refills: 1 | Status: SHIPPED | OUTPATIENT
Start: 2023-11-06

## 2023-11-06 RX ORDER — OMEPRAZOLE 40 MG/1
40 CAPSULE, DELAYED RELEASE ORAL
Qty: 90 CAPSULE | Refills: 0 | Status: SHIPPED | OUTPATIENT
Start: 2023-11-06

## 2023-11-15 ENCOUNTER — ANESTHESIA (OUTPATIENT)
Dept: SURGERY | Age: 78
End: 2023-11-15
Payer: MEDICARE

## 2023-11-15 ENCOUNTER — APPOINTMENT (OUTPATIENT)
Dept: CT IMAGING | Age: 78
DRG: 329 | End: 2023-11-15
Payer: MEDICARE

## 2023-11-15 ENCOUNTER — HOSPITAL ENCOUNTER (INPATIENT)
Age: 78
LOS: 6 days | Discharge: HOME HEALTH | DRG: 329 | End: 2023-11-21
Attending: STUDENT IN AN ORGANIZED HEALTH CARE EDUCATION/TRAINING PROGRAM | Admitting: SURGERY
Payer: MEDICARE

## 2023-11-15 ENCOUNTER — ANESTHESIA EVENT (OUTPATIENT)
Dept: SURGERY | Age: 78
End: 2023-11-15
Payer: MEDICARE

## 2023-11-15 DIAGNOSIS — C18.9 COLON CARCINOMA METASTATIC TO INTRA-ABDOMINAL LYMPH NODE (HCC): ICD-10-CM

## 2023-11-15 DIAGNOSIS — C77.2 COLON CARCINOMA METASTATIC TO INTRA-ABDOMINAL LYMPH NODE (HCC): ICD-10-CM

## 2023-11-15 DIAGNOSIS — I26.99 PULMONARY EMBOLISM, BILATERAL (HCC): ICD-10-CM

## 2023-11-15 DIAGNOSIS — K35.20 ACUTE APPENDICITIS WITH GENERALIZED PERITONITIS, UNSPECIFIED WHETHER ABSCESS PRESENT, UNSPECIFIED WHETHER GANGRENE PRESENT, UNSPECIFIED WHETHER PERFORATION PRESENT: Primary | ICD-10-CM

## 2023-11-15 DIAGNOSIS — I50.9 ACUTE CONGESTIVE HEART FAILURE, UNSPECIFIED HEART FAILURE TYPE (HCC): ICD-10-CM

## 2023-11-15 PROBLEM — K35.30 ACUTE APPENDICITIS WITH LOCALIZED PERITONITIS, WITHOUT PERFORATION, ABSCESS, OR GANGRENE: Status: ACTIVE | Noted: 2023-11-15

## 2023-11-15 PROBLEM — R93.2 ABNORMAL LIVER CT: Status: ACTIVE | Noted: 2023-11-15

## 2023-11-15 PROBLEM — D68.9 COAGULOPATHY (HCC): Status: ACTIVE | Noted: 2023-11-15

## 2023-11-15 PROBLEM — K63.89 CECUM MASS: Status: ACTIVE | Noted: 2023-11-15

## 2023-11-15 PROBLEM — K55.9 SMALL BOWEL ISCHEMIA (HCC): Status: ACTIVE | Noted: 2023-11-15

## 2023-11-15 PROBLEM — K52.9 ILEITIS: Status: ACTIVE | Noted: 2023-11-15

## 2023-11-15 PROBLEM — K65.9 LOCALIZED PERITONITIS (HCC): Status: ACTIVE | Noted: 2023-11-15

## 2023-11-15 PROBLEM — K35.80 ACUTE APPENDICITIS: Status: ACTIVE | Noted: 2023-11-15

## 2023-11-15 PROBLEM — D72.829 LEUKOCYTOSIS: Status: ACTIVE | Noted: 2023-11-15

## 2023-11-15 PROBLEM — K35.30 ACUTE APPENDICITIS WITH LOCALIZED PERITONITIS, WITHOUT GANGRENE OR ABSCESS: Status: ACTIVE | Noted: 2023-11-15

## 2023-11-15 PROBLEM — R10.30 LOWER ABDOMINAL PAIN: Status: ACTIVE | Noted: 2023-11-15

## 2023-11-15 LAB
ALBUMIN SERPL-MCNC: 3.1 G/DL (ref 3.2–4.6)
ALBUMIN/GLOB SERPL: 0.8 (ref 0.4–1.6)
ALP SERPL-CCNC: 107 U/L (ref 50–136)
ALT SERPL-CCNC: 16 U/L (ref 12–65)
ANION GAP SERPL CALC-SCNC: 8 MMOL/L (ref 2–11)
APPEARANCE UR: ABNORMAL
AST SERPL-CCNC: 12 U/L (ref 15–37)
BACTERIA URNS QL MICRO: ABNORMAL /HPF
BASOPHILS # BLD: 0.1 K/UL (ref 0–0.2)
BASOPHILS NFR BLD: 0 % (ref 0–2)
BILIRUB SERPL-MCNC: 0.5 MG/DL (ref 0.2–1.1)
BILIRUB UR QL: NEGATIVE
BUN SERPL-MCNC: 20 MG/DL (ref 8–23)
CALCIUM SERPL-MCNC: 9.3 MG/DL (ref 8.3–10.4)
CHLORIDE SERPL-SCNC: 107 MMOL/L (ref 101–110)
CO2 SERPL-SCNC: 25 MMOL/L (ref 21–32)
COLOR UR: ABNORMAL
CREAT SERPL-MCNC: 0.88 MG/DL (ref 0.6–1)
DIFFERENTIAL METHOD BLD: ABNORMAL
EOSINOPHIL # BLD: 0 K/UL (ref 0–0.8)
EOSINOPHIL NFR BLD: 0 % (ref 0.5–7.8)
EPI CELLS #/AREA URNS HPF: ABNORMAL /HPF
ERYTHROCYTE [DISTWIDTH] IN BLOOD BY AUTOMATED COUNT: 13.5 % (ref 11.9–14.6)
GLOBULIN SER CALC-MCNC: 3.7 G/DL (ref 2.8–4.5)
GLUCOSE SERPL-MCNC: 124 MG/DL (ref 65–100)
GLUCOSE UR STRIP.AUTO-MCNC: NEGATIVE MG/DL
HCT VFR BLD AUTO: 29.1 % (ref 35.8–46.3)
HGB BLD-MCNC: 9 G/DL (ref 11.7–15.4)
HGB UR QL STRIP: NEGATIVE
IMM GRANULOCYTES # BLD AUTO: 0.1 K/UL (ref 0–0.5)
IMM GRANULOCYTES NFR BLD AUTO: 1 % (ref 0–5)
KETONES UR QL STRIP.AUTO: NEGATIVE MG/DL
LACTATE SERPL-SCNC: 1.4 MMOL/L (ref 0.4–2)
LEUKOCYTE ESTERASE UR QL STRIP.AUTO: ABNORMAL
LIPASE SERPL-CCNC: 40 U/L (ref 73–393)
LYMPHOCYTES # BLD: 1.1 K/UL (ref 0.5–4.6)
LYMPHOCYTES NFR BLD: 6 % (ref 13–44)
MCH RBC QN AUTO: 28.8 PG (ref 26.1–32.9)
MCHC RBC AUTO-ENTMCNC: 30.9 G/DL (ref 31.4–35)
MCV RBC AUTO: 93 FL (ref 82–102)
MONOCYTES # BLD: 0.8 K/UL (ref 0.1–1.3)
MONOCYTES NFR BLD: 5 % (ref 4–12)
MUCOUS THREADS URNS QL MICRO: ABNORMAL /LPF
NEUTS SEG # BLD: 16.1 K/UL (ref 1.7–8.2)
NEUTS SEG NFR BLD: 88 % (ref 43–78)
NITRITE UR QL STRIP.AUTO: NEGATIVE
NRBC # BLD: 0 K/UL (ref 0–0.2)
PH UR STRIP: 5 (ref 5–9)
PLATELET # BLD AUTO: 345 K/UL (ref 150–450)
PMV BLD AUTO: 10.6 FL (ref 9.4–12.3)
POTASSIUM SERPL-SCNC: 3.4 MMOL/L (ref 3.5–5.1)
PROT SERPL-MCNC: 6.8 G/DL (ref 6.3–8.2)
PROT UR STRIP-MCNC: ABNORMAL MG/DL
RBC # BLD AUTO: 3.13 M/UL (ref 4.05–5.2)
RBC #/AREA URNS HPF: ABNORMAL /HPF
SODIUM SERPL-SCNC: 140 MMOL/L (ref 133–143)
SP GR UR REFRACTOMETRY: 1.02 (ref 1–1.02)
UROBILINOGEN UR QL STRIP.AUTO: 0.2 EU/DL (ref 0.2–1)
WBC # BLD AUTO: 18.2 K/UL (ref 4.3–11.1)
WBC URNS QL MICRO: ABNORMAL /HPF
YEAST URNS QL MICRO: ABNORMAL

## 2023-11-15 PROCEDURE — 3600000014 HC SURGERY LEVEL 4 ADDTL 15MIN: Performed by: SURGERY

## 2023-11-15 PROCEDURE — 6370000000 HC RX 637 (ALT 250 FOR IP): Performed by: SURGERY

## 2023-11-15 PROCEDURE — 96374 THER/PROPH/DIAG INJ IV PUSH: CPT

## 2023-11-15 PROCEDURE — 0D1B0Z4 BYPASS ILEUM TO CUTANEOUS, OPEN APPROACH: ICD-10-PCS | Performed by: SURGERY

## 2023-11-15 PROCEDURE — 83690 ASSAY OF LIPASE: CPT

## 2023-11-15 PROCEDURE — 2500000003 HC RX 250 WO HCPCS: Performed by: NURSE ANESTHETIST, CERTIFIED REGISTERED

## 2023-11-15 PROCEDURE — 6360000002 HC RX W HCPCS: Performed by: ANESTHESIOLOGY

## 2023-11-15 PROCEDURE — 2700000000 HC OXYGEN THERAPY PER DAY

## 2023-11-15 PROCEDURE — 6360000004 HC RX CONTRAST MEDICATION: Performed by: PHYSICIAN ASSISTANT

## 2023-11-15 PROCEDURE — 2580000003 HC RX 258: Performed by: NURSE ANESTHETIST, CERTIFIED REGISTERED

## 2023-11-15 PROCEDURE — 99223 1ST HOSP IP/OBS HIGH 75: CPT | Performed by: SURGERY

## 2023-11-15 PROCEDURE — 6360000002 HC RX W HCPCS: Performed by: PHYSICIAN ASSISTANT

## 2023-11-15 PROCEDURE — 81479 UNLISTED MOLECULAR PATHOLOGY: CPT

## 2023-11-15 PROCEDURE — 3700000000 HC ANESTHESIA ATTENDED CARE: Performed by: SURGERY

## 2023-11-15 PROCEDURE — 83605 ASSAY OF LACTIC ACID: CPT

## 2023-11-15 PROCEDURE — 44110 EXCISE INTESTINE LESION(S): CPT | Performed by: SURGERY

## 2023-11-15 PROCEDURE — 2580000003 HC RX 258: Performed by: PHYSICIAN ASSISTANT

## 2023-11-15 PROCEDURE — 0DTH0ZZ RESECTION OF CECUM, OPEN APPROACH: ICD-10-PCS | Performed by: SURGERY

## 2023-11-15 PROCEDURE — 3600000004 HC SURGERY LEVEL 4 BASE: Performed by: SURGERY

## 2023-11-15 PROCEDURE — 6360000002 HC RX W HCPCS: Performed by: SURGERY

## 2023-11-15 PROCEDURE — 81001 URINALYSIS AUTO W/SCOPE: CPT

## 2023-11-15 PROCEDURE — 0W9G00Z DRAINAGE OF PERITONEAL CAVITY WITH DRAINAGE DEVICE, OPEN APPROACH: ICD-10-PCS | Performed by: SURGERY

## 2023-11-15 PROCEDURE — 96375 TX/PRO/DX INJ NEW DRUG ADDON: CPT

## 2023-11-15 PROCEDURE — 6360000002 HC RX W HCPCS: Performed by: NURSE ANESTHETIST, CERTIFIED REGISTERED

## 2023-11-15 PROCEDURE — 74177 CT ABD & PELVIS W/CONTRAST: CPT

## 2023-11-15 PROCEDURE — A4216 STERILE WATER/SALINE, 10 ML: HCPCS | Performed by: SURGERY

## 2023-11-15 PROCEDURE — 44160 REMOVAL OF COLON: CPT | Performed by: SURGERY

## 2023-11-15 PROCEDURE — 88361 TUMOR IMMUNOHISTOCHEM/COMPUT: CPT

## 2023-11-15 PROCEDURE — 3700000001 HC ADD 15 MINUTES (ANESTHESIA): Performed by: SURGERY

## 2023-11-15 PROCEDURE — 87075 CULTR BACTERIA EXCEPT BLOOD: CPT

## 2023-11-15 PROCEDURE — 87205 SMEAR GRAM STAIN: CPT

## 2023-11-15 PROCEDURE — 96361 HYDRATE IV INFUSION ADD-ON: CPT

## 2023-11-15 PROCEDURE — 7100000000 HC PACU RECOVERY - FIRST 15 MIN: Performed by: SURGERY

## 2023-11-15 PROCEDURE — 80053 COMPREHEN METABOLIC PANEL: CPT

## 2023-11-15 PROCEDURE — 87077 CULTURE AEROBIC IDENTIFY: CPT

## 2023-11-15 PROCEDURE — 94760 N-INVAS EAR/PLS OXIMETRY 1: CPT

## 2023-11-15 PROCEDURE — 87186 SC STD MICRODIL/AGAR DIL: CPT

## 2023-11-15 PROCEDURE — 2709999900 HC NON-CHARGEABLE SUPPLY: Performed by: SURGERY

## 2023-11-15 PROCEDURE — 2720000010 HC SURG SUPPLY STERILE: Performed by: SURGERY

## 2023-11-15 PROCEDURE — 85025 COMPLETE CBC W/AUTO DIFF WBC: CPT

## 2023-11-15 PROCEDURE — 7100000001 HC PACU RECOVERY - ADDTL 15 MIN: Performed by: SURGERY

## 2023-11-15 PROCEDURE — 88305 TISSUE EXAM BY PATHOLOGIST: CPT

## 2023-11-15 PROCEDURE — 87070 CULTURE OTHR SPECIMN AEROBIC: CPT

## 2023-11-15 PROCEDURE — 49020 DRAINAGE ABDOM ABSCESS OPEN: CPT | Performed by: SURGERY

## 2023-11-15 PROCEDURE — 2580000003 HC RX 258: Performed by: SURGERY

## 2023-11-15 PROCEDURE — 2500000003 HC RX 250 WO HCPCS: Performed by: SURGERY

## 2023-11-15 PROCEDURE — 99285 EMERGENCY DEPT VISIT HI MDM: CPT

## 2023-11-15 PROCEDURE — 1100000000 HC RM PRIVATE

## 2023-11-15 PROCEDURE — 94761 N-INVAS EAR/PLS OXIMETRY MLT: CPT

## 2023-11-15 RX ORDER — DEXAMETHASONE SODIUM PHOSPHATE 10 MG/ML
INJECTION INTRAMUSCULAR; INTRAVENOUS PRN
Status: DISCONTINUED | OUTPATIENT
Start: 2023-11-15 | End: 2023-11-15 | Stop reason: SDUPTHER

## 2023-11-15 RX ORDER — SODIUM CHLORIDE 0.9 % (FLUSH) 0.9 %
5-40 SYRINGE (ML) INJECTION PRN
Status: CANCELLED | OUTPATIENT
Start: 2023-11-15

## 2023-11-15 RX ORDER — GLYCOPYRROLATE 0.2 MG/ML
INJECTION INTRAMUSCULAR; INTRAVENOUS PRN
Status: DISCONTINUED | OUTPATIENT
Start: 2023-11-15 | End: 2023-11-15 | Stop reason: SDUPTHER

## 2023-11-15 RX ORDER — LIDOCAINE HYDROCHLORIDE 10 MG/ML
1 INJECTION, SOLUTION INFILTRATION; PERINEURAL
Status: CANCELLED | OUTPATIENT
Start: 2023-11-15 | End: 2023-11-16

## 2023-11-15 RX ORDER — ONDANSETRON 2 MG/ML
4 INJECTION INTRAMUSCULAR; INTRAVENOUS
Status: COMPLETED | OUTPATIENT
Start: 2023-11-15 | End: 2023-11-15

## 2023-11-15 RX ORDER — MORPHINE SULFATE 10 MG/ML
6 INJECTION, SOLUTION INTRAMUSCULAR; INTRAVENOUS
Status: DISCONTINUED | OUTPATIENT
Start: 2023-11-15 | End: 2023-11-17

## 2023-11-15 RX ORDER — ONDANSETRON 2 MG/ML
4 INJECTION INTRAMUSCULAR; INTRAVENOUS EVERY 6 HOURS PRN
Status: DISCONTINUED | OUTPATIENT
Start: 2023-11-15 | End: 2023-11-21 | Stop reason: HOSPADM

## 2023-11-15 RX ORDER — ONDANSETRON 2 MG/ML
INJECTION INTRAMUSCULAR; INTRAVENOUS PRN
Status: DISCONTINUED | OUTPATIENT
Start: 2023-11-15 | End: 2023-11-15 | Stop reason: SDUPTHER

## 2023-11-15 RX ORDER — EPHEDRINE SULFATE/0.9% NACL/PF 50 MG/5 ML
SYRINGE (ML) INTRAVENOUS PRN
Status: DISCONTINUED | OUTPATIENT
Start: 2023-11-15 | End: 2023-11-15 | Stop reason: SDUPTHER

## 2023-11-15 RX ORDER — LABETALOL HYDROCHLORIDE 5 MG/ML
INJECTION, SOLUTION INTRAVENOUS PRN
Status: DISCONTINUED | OUTPATIENT
Start: 2023-11-15 | End: 2023-11-15 | Stop reason: SDUPTHER

## 2023-11-15 RX ORDER — ACETAMINOPHEN 500 MG
1000 TABLET ORAL ONCE
Status: CANCELLED | OUTPATIENT
Start: 2023-11-15 | End: 2023-11-15

## 2023-11-15 RX ORDER — SODIUM CHLORIDE 9 MG/ML
INJECTION, SOLUTION INTRAVENOUS CONTINUOUS PRN
Status: DISCONTINUED | OUTPATIENT
Start: 2023-11-15 | End: 2023-11-15 | Stop reason: SDUPTHER

## 2023-11-15 RX ORDER — FENTANYL CITRATE 50 UG/ML
INJECTION, SOLUTION INTRAMUSCULAR; INTRAVENOUS PRN
Status: DISCONTINUED | OUTPATIENT
Start: 2023-11-15 | End: 2023-11-15 | Stop reason: SDUPTHER

## 2023-11-15 RX ORDER — MORPHINE SULFATE 2 MG/ML
2 INJECTION, SOLUTION INTRAMUSCULAR; INTRAVENOUS
Status: DISCONTINUED | OUTPATIENT
Start: 2023-11-15 | End: 2023-11-17

## 2023-11-15 RX ORDER — BUPIVACAINE HYDROCHLORIDE 2.5 MG/ML
INJECTION, SOLUTION EPIDURAL; INFILTRATION; INTRACAUDAL PRN
Status: DISCONTINUED | OUTPATIENT
Start: 2023-11-15 | End: 2023-11-15 | Stop reason: ALTCHOICE

## 2023-11-15 RX ORDER — SODIUM CHLORIDE 9 MG/ML
INJECTION, SOLUTION INTRAVENOUS PRN
Status: DISCONTINUED | OUTPATIENT
Start: 2023-11-15 | End: 2023-11-15

## 2023-11-15 RX ORDER — SODIUM CHLORIDE 0.9 % (FLUSH) 0.9 %
5-40 SYRINGE (ML) INJECTION PRN
Status: DISCONTINUED | OUTPATIENT
Start: 2023-11-15 | End: 2023-11-15 | Stop reason: HOSPADM

## 2023-11-15 RX ORDER — SODIUM CHLORIDE, SODIUM LACTATE, POTASSIUM CHLORIDE, CALCIUM CHLORIDE 600; 310; 30; 20 MG/100ML; MG/100ML; MG/100ML; MG/100ML
INJECTION, SOLUTION INTRAVENOUS CONTINUOUS
Status: DISCONTINUED | OUTPATIENT
Start: 2023-11-15 | End: 2023-11-16

## 2023-11-15 RX ORDER — MIDAZOLAM HYDROCHLORIDE 2 MG/2ML
2 INJECTION, SOLUTION INTRAMUSCULAR; INTRAVENOUS
Status: CANCELLED | OUTPATIENT
Start: 2023-11-15 | End: 2023-11-16

## 2023-11-15 RX ORDER — DEXTROSE MONOHYDRATE 100 MG/ML
INJECTION, SOLUTION INTRAVENOUS CONTINUOUS PRN
Status: DISCONTINUED | OUTPATIENT
Start: 2023-11-15 | End: 2023-11-15 | Stop reason: HOSPADM

## 2023-11-15 RX ORDER — SODIUM CHLORIDE 0.9 % (FLUSH) 0.9 %
5-40 SYRINGE (ML) INJECTION EVERY 12 HOURS SCHEDULED
Status: DISCONTINUED | OUTPATIENT
Start: 2023-11-15 | End: 2023-11-15 | Stop reason: HOSPADM

## 2023-11-15 RX ORDER — ROCURONIUM BROMIDE 10 MG/ML
INJECTION, SOLUTION INTRAVENOUS PRN
Status: DISCONTINUED | OUTPATIENT
Start: 2023-11-15 | End: 2023-11-15 | Stop reason: SDUPTHER

## 2023-11-15 RX ORDER — PROPOFOL 10 MG/ML
INJECTION, EMULSION INTRAVENOUS PRN
Status: DISCONTINUED | OUTPATIENT
Start: 2023-11-15 | End: 2023-11-15 | Stop reason: SDUPTHER

## 2023-11-15 RX ORDER — SODIUM CHLORIDE, SODIUM LACTATE, POTASSIUM CHLORIDE, CALCIUM CHLORIDE 600; 310; 30; 20 MG/100ML; MG/100ML; MG/100ML; MG/100ML
INJECTION, SOLUTION INTRAVENOUS CONTINUOUS
Status: CANCELLED | OUTPATIENT
Start: 2023-11-15

## 2023-11-15 RX ORDER — 0.9 % SODIUM CHLORIDE 0.9 %
1000 INTRAVENOUS SOLUTION INTRAVENOUS
Status: DISCONTINUED | OUTPATIENT
Start: 2023-11-15 | End: 2023-11-15

## 2023-11-15 RX ORDER — SODIUM CHLORIDE, SODIUM LACTATE, POTASSIUM CHLORIDE, CALCIUM CHLORIDE 600; 310; 30; 20 MG/100ML; MG/100ML; MG/100ML; MG/100ML
INJECTION, SOLUTION INTRAVENOUS CONTINUOUS
Status: DISCONTINUED | OUTPATIENT
Start: 2023-11-15 | End: 2023-11-15

## 2023-11-15 RX ORDER — MORPHINE SULFATE 4 MG/ML
4 INJECTION INTRAVENOUS ONCE
Status: COMPLETED | OUTPATIENT
Start: 2023-11-15 | End: 2023-11-15

## 2023-11-15 RX ORDER — SODIUM CHLORIDE 0.9 % (FLUSH) 0.9 %
5-40 SYRINGE (ML) INJECTION EVERY 12 HOURS SCHEDULED
Status: CANCELLED | OUTPATIENT
Start: 2023-11-15

## 2023-11-15 RX ORDER — NEOSTIGMINE METHYLSULFATE 1 MG/ML
INJECTION, SOLUTION INTRAVENOUS PRN
Status: DISCONTINUED | OUTPATIENT
Start: 2023-11-15 | End: 2023-11-15 | Stop reason: SDUPTHER

## 2023-11-15 RX ORDER — ACETAMINOPHEN 500 MG
1000 TABLET ORAL EVERY 6 HOURS PRN
Status: DISCONTINUED | OUTPATIENT
Start: 2023-11-15 | End: 2023-11-17

## 2023-11-15 RX ORDER — ONDANSETRON 2 MG/ML
4 INJECTION INTRAMUSCULAR; INTRAVENOUS
Status: DISCONTINUED | OUTPATIENT
Start: 2023-11-15 | End: 2023-11-15 | Stop reason: HOSPADM

## 2023-11-15 RX ORDER — OXYCODONE HYDROCHLORIDE 5 MG/1
10 TABLET ORAL EVERY 4 HOURS PRN
Status: DISCONTINUED | OUTPATIENT
Start: 2023-11-15 | End: 2023-11-17

## 2023-11-15 RX ORDER — SUCCINYLCHOLINE/SOD CL,ISO/PF 200MG/10ML
SYRINGE (ML) INTRAVENOUS PRN
Status: DISCONTINUED | OUTPATIENT
Start: 2023-11-15 | End: 2023-11-15 | Stop reason: SDUPTHER

## 2023-11-15 RX ORDER — HALOPERIDOL 5 MG/ML
1 INJECTION INTRAMUSCULAR
Status: DISCONTINUED | OUTPATIENT
Start: 2023-11-15 | End: 2023-11-15 | Stop reason: HOSPADM

## 2023-11-15 RX ORDER — OXYCODONE HYDROCHLORIDE 5 MG/1
5 TABLET ORAL
Status: DISCONTINUED | OUTPATIENT
Start: 2023-11-15 | End: 2023-11-15 | Stop reason: HOSPADM

## 2023-11-15 RX ORDER — OXYCODONE HYDROCHLORIDE 5 MG/1
5 TABLET ORAL EVERY 4 HOURS PRN
Status: DISCONTINUED | OUTPATIENT
Start: 2023-11-15 | End: 2023-11-17

## 2023-11-15 RX ORDER — 0.9 % SODIUM CHLORIDE 0.9 %
1000 INTRAVENOUS SOLUTION INTRAVENOUS
Status: COMPLETED | OUTPATIENT
Start: 2023-11-15 | End: 2023-11-15

## 2023-11-15 RX ORDER — LIDOCAINE HYDROCHLORIDE 20 MG/ML
INJECTION, SOLUTION EPIDURAL; INFILTRATION; INTRACAUDAL; PERINEURAL PRN
Status: DISCONTINUED | OUTPATIENT
Start: 2023-11-15 | End: 2023-11-15 | Stop reason: SDUPTHER

## 2023-11-15 RX ORDER — MORPHINE SULFATE 4 MG/ML
4 INJECTION, SOLUTION INTRAMUSCULAR; INTRAVENOUS
Status: DISCONTINUED | OUTPATIENT
Start: 2023-11-15 | End: 2023-11-17

## 2023-11-15 RX ORDER — SODIUM CHLORIDE 9 MG/ML
INJECTION, SOLUTION INTRAVENOUS PRN
Status: CANCELLED | OUTPATIENT
Start: 2023-11-15

## 2023-11-15 RX ADMIN — DEXAMETHASONE SODIUM PHOSPHATE 5 MG: 10 INJECTION INTRAMUSCULAR; INTRAVENOUS at 16:49

## 2023-11-15 RX ADMIN — Medication 3 MG: at 18:25

## 2023-11-15 RX ADMIN — FENTANYL CITRATE 25 MCG: 50 INJECTION, SOLUTION INTRAMUSCULAR; INTRAVENOUS at 18:27

## 2023-11-15 RX ADMIN — FENTANYL CITRATE 25 MCG: 50 INJECTION, SOLUTION INTRAMUSCULAR; INTRAVENOUS at 17:10

## 2023-11-15 RX ADMIN — Medication 10 MG: at 16:43

## 2023-11-15 RX ADMIN — LABETALOL HYDROCHLORIDE 5 MG: 5 INJECTION INTRAVENOUS at 17:14

## 2023-11-15 RX ADMIN — HYDROMORPHONE HYDROCHLORIDE 0.25 MG: 1 INJECTION, SOLUTION INTRAMUSCULAR; INTRAVENOUS; SUBCUTANEOUS at 19:11

## 2023-11-15 RX ADMIN — ONDANSETRON 4 MG: 2 INJECTION INTRAMUSCULAR; INTRAVENOUS at 15:03

## 2023-11-15 RX ADMIN — SODIUM CHLORIDE: 9 INJECTION, SOLUTION INTRAVENOUS at 16:19

## 2023-11-15 RX ADMIN — HYDROMORPHONE HYDROCHLORIDE 0.25 MG: 1 INJECTION, SOLUTION INTRAMUSCULAR; INTRAVENOUS; SUBCUTANEOUS at 19:21

## 2023-11-15 RX ADMIN — PIPERACILLIN AND TAZOBACTAM 4500 MG: 4; .5 INJECTION, POWDER, LYOPHILIZED, FOR SOLUTION INTRAVENOUS at 15:02

## 2023-11-15 RX ADMIN — PHENYLEPHRINE HYDROCHLORIDE 100 MCG: 0.1 INJECTION, SOLUTION INTRAVENOUS at 16:43

## 2023-11-15 RX ADMIN — ROCURONIUM BROMIDE 15 MG: 10 INJECTION, SOLUTION INTRAVENOUS at 16:46

## 2023-11-15 RX ADMIN — ROCURONIUM BROMIDE 5 MG: 10 INJECTION, SOLUTION INTRAVENOUS at 17:19

## 2023-11-15 RX ADMIN — PIPERACILLIN AND TAZOBACTAM 4500 MG: 4; .5 INJECTION, POWDER, LYOPHILIZED, FOR SOLUTION INTRAVENOUS at 21:47

## 2023-11-15 RX ADMIN — IOPAMIDOL 100 ML: 755 INJECTION, SOLUTION INTRAVENOUS at 13:35

## 2023-11-15 RX ADMIN — ONDANSETRON 4 MG: 2 INJECTION INTRAMUSCULAR; INTRAVENOUS at 16:49

## 2023-11-15 RX ADMIN — FENTANYL CITRATE 25 MCG: 50 INJECTION, SOLUTION INTRAMUSCULAR; INTRAVENOUS at 16:35

## 2023-11-15 RX ADMIN — Medication 120 MG: at 16:36

## 2023-11-15 RX ADMIN — HYDROMORPHONE HYDROCHLORIDE 0.25 MG: 1 INJECTION, SOLUTION INTRAMUSCULAR; INTRAVENOUS; SUBCUTANEOUS at 19:26

## 2023-11-15 RX ADMIN — OXYCODONE 10 MG: 5 TABLET ORAL at 21:35

## 2023-11-15 RX ADMIN — PHENYLEPHRINE HYDROCHLORIDE 100 MCG: 0.1 INJECTION, SOLUTION INTRAVENOUS at 16:41

## 2023-11-15 RX ADMIN — FAMOTIDINE 20 MG: 10 INJECTION INTRAVENOUS at 21:40

## 2023-11-15 RX ADMIN — SODIUM CHLORIDE, SODIUM LACTATE, POTASSIUM CHLORIDE, AND CALCIUM CHLORIDE: 600; 310; 30; 20 INJECTION, SOLUTION INTRAVENOUS at 17:00

## 2023-11-15 RX ADMIN — FENTANYL CITRATE 50 MCG: 50 INJECTION, SOLUTION INTRAMUSCULAR; INTRAVENOUS at 17:03

## 2023-11-15 RX ADMIN — LIDOCAINE HYDROCHLORIDE 40 MG: 20 INJECTION, SOLUTION EPIDURAL; INFILTRATION; INTRACAUDAL; PERINEURAL at 16:35

## 2023-11-15 RX ADMIN — Medication 10 MG: at 16:52

## 2023-11-15 RX ADMIN — MORPHINE SULFATE 4 MG: 4 INJECTION, SOLUTION INTRAMUSCULAR; INTRAVENOUS at 15:03

## 2023-11-15 RX ADMIN — ROCURONIUM BROMIDE 5 MG: 10 INJECTION, SOLUTION INTRAVENOUS at 16:35

## 2023-11-15 RX ADMIN — PROPOFOL 100 MG: 10 INJECTION, EMULSION INTRAVENOUS at 16:36

## 2023-11-15 RX ADMIN — HYDROMORPHONE HYDROCHLORIDE 0.25 MG: 1 INJECTION, SOLUTION INTRAMUSCULAR; INTRAVENOUS; SUBCUTANEOUS at 19:16

## 2023-11-15 RX ADMIN — GLYCOPYRROLATE 0.4 MG: 0.2 INJECTION INTRAMUSCULAR; INTRAVENOUS at 18:25

## 2023-11-15 RX ADMIN — SODIUM CHLORIDE 1000 ML: 9 INJECTION, SOLUTION INTRAVENOUS at 12:02

## 2023-11-15 ASSESSMENT — PAIN SCALES - GENERAL
PAINLEVEL_OUTOF10: 6
PAINLEVEL_OUTOF10: 7
PAINLEVEL_OUTOF10: 9

## 2023-11-15 ASSESSMENT — PAIN DESCRIPTION - LOCATION
LOCATION: ABDOMEN

## 2023-11-15 ASSESSMENT — PAIN DESCRIPTION - DESCRIPTORS: DESCRIPTORS: ACHING

## 2023-11-15 ASSESSMENT — PAIN DESCRIPTION - ORIENTATION: ORIENTATION: RIGHT;MID

## 2023-11-15 ASSESSMENT — PAIN SCALES - WONG BAKER: WONGBAKER_NUMERICALRESPONSE: 2

## 2023-11-15 ASSESSMENT — PAIN - FUNCTIONAL ASSESSMENT: PAIN_FUNCTIONAL_ASSESSMENT: 0-10

## 2023-11-15 NOTE — H&P
H&P/Consult Note/Progress Note/Office Note:   Mil Cyr  MRN: [id number]  :1945  Age:78 y.o.    HPI: Mil Cyr is a 70 y.o. female who came to the ER on 11/15/23 complaining of progressive lower abd pain for 1-2months   which became severe and constant for 3 days prior to coming to the ER. WBC 18.2k; Hgb 9.0  BUN/Cr 20/0.88  LFTs/Lipase  normal   Lactic acid 1.4      She is s/p laparoscopic RIH with 3D mesh mesh and 5mm ProTAck device 23 by Dr Jeremie Valadez  She had post-op bilateral PEs diagnosed on CT on 23 and treated with Xarelto daily    Her last dose of Xarelto was on 23    She had the below CT and gen surgery was consulted           11/15/23 CT abd/pelvis with IV contrast  EXAMINATION: CT  ABDOMEN / PELVIS   11/15/2023 1:40 PM      LOWER THORAX: Subsegmental atelectasis and/or scarring in the lung bases. LIVER: There is minimal periportal edema. New 1.4 cm hypoattenuating area in the left hepatic lobe. BILIARY: Gallbladder is unremarkable. No intrahepatic or extrahepatic biliary ductal dilation. SPLEEN: No splenomegaly or concerning lesion. PANCREAS: No pancreatic duct dilation or mass. ADRENALS: No adrenal nodules or hypertrophy. URINARY: Kidneys are symmetric in size and enhancement. 1.1 severe indeterminate density exophytic lesion on the right lower pole remains stable. 1.0 cm cyst on the left kidney. No hydronephrosis or left urolithiasis. Ureters and urinary bladder are within normal limits. BOWEL: The appendix is dilated to 2.0 cm in diameter with wall thickening and periappendiceal fat stranding and free fluid. The largest pocket of free fluid measures up to approximately 2.7 cm, though no discrete rim enhancement to suggest abscess. No evidence of free air. There is wall thickening and inflammatory changes involving the adjacent duodenum, jejunum, ileum, and cecum which is most likely reactive. No evidence of bowel obstruction.

## 2023-11-15 NOTE — ACP (ADVANCE CARE PLANNING)
Advance Care Planning     Advance Care Planning Activator (Inpatient)  Conversation Note      Date of ACP Conversation: 11/15/2023     Conversation Conducted with: Patient with Decision Making Capacity    ACP Activator: MARCK Palma      Health Care Decision Maker:     Current Designated Health Care Decision Maker:     Primary Decision Maker: Zita Hernandez - Child - [phone]  Click here to complete Healthcare Decision Makers including section of the Healthcare Decision Maker Relationship (ie \"Primary\")  Today we documented Decision Maker(s) consistent with ACP documents on file. Care Preferences    SW met with patient who confirmed that she has HCPOA documents and that the one on file is the most recent. Patient names her daughter Francine Wilson as her primary decision maker. Patient will discuss code status preferences with her admitting provider. [x] Yes   [] No   Educated Patient / Valentina Jarrett regarding differences between Advance Directives and portable DNR orders.     Length of ACP Conversation in minutes:  10    Conversation Outcomes:  ACP discussion completed    Follow-up plan:    [] Schedule follow-up conversation to continue planning  [x] Referred individual to Provider for additional questions/concerns   [] Advised patient/agent/surrogate to review completed ACP document and update if needed with changes in condition, patient preferences or care setting    [] This note routed to one or more involved healthcare providers

## 2023-11-15 NOTE — CARE COORDINATION
SW met with patient who confirmed demographic information, states that she lives alone and does not have local family but friends who are very supportive. Patient is insured and established with primary care. She does not use assistive devices to ambulate, denies any falls, and is independent in her ADLs. Patient reports feeling weaker than baseline these last few days but has been able to manage her ADLS and ambulating without difficulty. Patient anticipated to admit. CM dept will continue to follow further workup and evaluations for new or additional needs that may arise during admission. CMI, ACP, and Milestones completed. ASSESSMENT NOTE    Attending Physician: Elisa Ordaz DO  Admit Problem: No admission diagnoses are documented for this encounter. Date/Time of Admission: 11/15/2023 11:38 AM  Problem List:  Patient Active Problem List   Diagnosis    Pulmonary embolism, bilateral (HCC)    Anemia    Bilateral pulmonary embolism (HCC)    Other fatigue       Service Assessment  Patient Orientation Alert and Oriented, Person, Place, Situation, Self   Cognition Alert   History Provided By Patient   Primary Caregiver Self   Accompanied By/Relationship     Support Systems Friends/Neighbors, Children   Patient's Healthcare Decision Maker is:     PCP Verified by CM Yes   Last Visit to PCP     Prior Functional Level Independent in ADLs/IADLs   Current Functional Level Independent in ADLs/IADLs   Can patient return to prior living arrangement Yes   Ability to make needs known: Good   Family able to assist with home care needs: Yes   Would you like for me to discuss the discharge plan with any other family members/significant others, and if so, who?  No   Financial Resources Medicare   Community Resources None   CM/AWA Referral       Social/Functional History  Lives With     Type of Home     Home Layout     Home Access     Entrance Stairs - Number of Steps     Entrance Stairs - Rails     Bathroom Shower/Tub

## 2023-11-15 NOTE — ANESTHESIA PRE PROCEDURE
reviewed and Nursing notes reviewed no history of anesthetic complications:   Airway: Mallampati: II  TM distance: >3 FB   Neck ROM: full  Mouth opening: > = 3 FB   Dental:    (+) upper dentures      Pulmonary:normal exam        Smoker: former. Cardiovascular:    (+) valvular problems/murmurs (Mild-moderate): MR,                ROS comment: Normal EF     Neuro/Psych:               GI/Hepatic/Renal:            ROS comment: Nausea/vomiting/diarrhea    Pancreatic cyst.   Endo/Other:    (+) hypothyroidism, blood dyscrasia (Hb 9 today): anemia and anticoagulation therapy:., .                 Abdominal:             Vascular:   + PE (Bilateral. Provoked after last surgery). Other Findings:             Anesthesia Plan      general     ASA 3 - emergent     (GETA, RSI)  Induction: intravenous and rapid sequence. MIPS: Postoperative opioids intended. Anesthetic plan and risks discussed with patient.                         Robyn Jaramillo MD   11/15/2023

## 2023-11-15 NOTE — ED PROVIDER NOTES
Emergency Department Provider Note       PCP: Jade Dyer MD   Age: 78 y.o.   Sex: female     DISPOSITION Decision To Admit 11/15/2023 02:47:10 PM       ICD-10-CM    1. Acute appendicitis with generalized peritonitis, unspecified whether abscess present, unspecified whether gangrene present, unspecified whether perforation present  K35.209           Medical Decision Making     Complexity of Problems Addressed:  1 or more acute illnesses that pose a threat to life or bodily function.     Data Reviewed and Analyzed:   I independently ordered and reviewed each unique test.  I reviewed external records: provider visit note from PCP.  I reviewed external records: provider visit note from outside specialist.  I reviewed external records: previous imaging study including radiologist interpretation.     I interpreted the CT Scan acute appendicitis without definite free air or abscess..    Discussion of management or test interpretation.  78-year-old female with worsening abdominal pain x1 week.  Labs demonstrate a elevated white count at 18.4.  Other labs within normal limits.  We did obtain CT scan which demonstrated acute appendicitis with severe surrounding periappendiceal inflammatory changes with free fluid but no discrete organized abscess or free air to confirm perforation.    Case was discussed with on-call general surgery service.  They recommend    Case was discussed with attending physician in the ER, Dr. Beckwith.  See his independent attestation.    Pain controlled here in department.  She was given Zofran for antibiotics.  She was placed into a bed upon finding of appendicitis.  Findings were discussed with patient and she is amenable to admission into the hospital.  The patient was admitted and I have discussed patient management with the admitting provider.  The management of this patient was discussed with an external consultant.      Risk of Complications and/or Morbidity of Patient  Mariana Castro, [place]  11/15/23 [id number]

## 2023-11-16 PROBLEM — K35.30 ACUTE APPENDICITIS WITH LOCALIZED PERITONITIS, WITHOUT GANGRENE OR ABSCESS: Status: RESOLVED | Noted: 2023-11-15 | Resolved: 2023-11-16

## 2023-11-16 LAB
ANION GAP SERPL CALC-SCNC: 6 MMOL/L (ref 2–11)
BASOPHILS # BLD: 0 K/UL (ref 0–0.2)
BASOPHILS NFR BLD: 0 % (ref 0–2)
BUN SERPL-MCNC: 15 MG/DL (ref 8–23)
CALCIUM SERPL-MCNC: 8.2 MG/DL (ref 8.3–10.4)
CHLORIDE SERPL-SCNC: 113 MMOL/L (ref 101–110)
CO2 SERPL-SCNC: 24 MMOL/L (ref 21–32)
CREAT SERPL-MCNC: 0.74 MG/DL (ref 0.6–1)
DIFFERENTIAL METHOD BLD: ABNORMAL
EOSINOPHIL # BLD: 0 K/UL (ref 0–0.8)
EOSINOPHIL NFR BLD: 0 % (ref 0.5–7.8)
ERYTHROCYTE [DISTWIDTH] IN BLOOD BY AUTOMATED COUNT: 13.4 % (ref 11.9–14.6)
GLUCOSE SERPL-MCNC: 170 MG/DL (ref 65–100)
HCT VFR BLD AUTO: 26.9 % (ref 35.8–46.3)
HGB BLD-MCNC: 8.2 G/DL (ref 11.7–15.4)
IMM GRANULOCYTES # BLD AUTO: 0.1 K/UL (ref 0–0.5)
IMM GRANULOCYTES NFR BLD AUTO: 1 % (ref 0–5)
LYMPHOCYTES # BLD: 0.6 K/UL (ref 0.5–4.6)
LYMPHOCYTES NFR BLD: 4 % (ref 13–44)
MCH RBC QN AUTO: 28.6 PG (ref 26.1–32.9)
MCHC RBC AUTO-ENTMCNC: 30.5 G/DL (ref 31.4–35)
MCV RBC AUTO: 93.7 FL (ref 82–102)
MONOCYTES # BLD: 0.5 K/UL (ref 0.1–1.3)
MONOCYTES NFR BLD: 3 % (ref 4–12)
NEUTS SEG # BLD: 14.5 K/UL (ref 1.7–8.2)
NEUTS SEG NFR BLD: 92 % (ref 43–78)
NRBC # BLD: 0 K/UL (ref 0–0.2)
PLATELET # BLD AUTO: 307 K/UL (ref 150–450)
PMV BLD AUTO: 10.8 FL (ref 9.4–12.3)
POTASSIUM SERPL-SCNC: 3.9 MMOL/L (ref 3.5–5.1)
RBC # BLD AUTO: 2.87 M/UL (ref 4.05–5.2)
SODIUM SERPL-SCNC: 143 MMOL/L (ref 133–143)
WBC # BLD AUTO: 15.8 K/UL (ref 4.3–11.1)

## 2023-11-16 PROCEDURE — 2700000000 HC OXYGEN THERAPY PER DAY

## 2023-11-16 PROCEDURE — 2500000003 HC RX 250 WO HCPCS: Performed by: SURGERY

## 2023-11-16 PROCEDURE — 36415 COLL VENOUS BLD VENIPUNCTURE: CPT

## 2023-11-16 PROCEDURE — 97165 OT EVAL LOW COMPLEX 30 MIN: CPT

## 2023-11-16 PROCEDURE — A4216 STERILE WATER/SALINE, 10 ML: HCPCS | Performed by: SURGERY

## 2023-11-16 PROCEDURE — 2580000003 HC RX 258: Performed by: SURGERY

## 2023-11-16 PROCEDURE — 80048 BASIC METABOLIC PNL TOTAL CA: CPT

## 2023-11-16 PROCEDURE — 85025 COMPLETE CBC W/AUTO DIFF WBC: CPT

## 2023-11-16 PROCEDURE — 99024 POSTOP FOLLOW-UP VISIT: CPT | Performed by: SURGERY

## 2023-11-16 PROCEDURE — 6370000000 HC RX 637 (ALT 250 FOR IP): Performed by: NURSE PRACTITIONER

## 2023-11-16 PROCEDURE — 6360000002 HC RX W HCPCS: Performed by: SURGERY

## 2023-11-16 PROCEDURE — 97530 THERAPEUTIC ACTIVITIES: CPT

## 2023-11-16 PROCEDURE — 6360000002 HC RX W HCPCS: Performed by: NURSE PRACTITIONER

## 2023-11-16 PROCEDURE — 97161 PT EVAL LOW COMPLEX 20 MIN: CPT

## 2023-11-16 PROCEDURE — 6370000000 HC RX 637 (ALT 250 FOR IP): Performed by: SURGERY

## 2023-11-16 PROCEDURE — 97535 SELF CARE MNGMENT TRAINING: CPT

## 2023-11-16 PROCEDURE — 94760 N-INVAS EAR/PLS OXIMETRY 1: CPT

## 2023-11-16 PROCEDURE — 1100000000 HC RM PRIVATE

## 2023-11-16 PROCEDURE — 94761 N-INVAS EAR/PLS OXIMETRY MLT: CPT

## 2023-11-16 RX ORDER — ENOXAPARIN SODIUM 100 MG/ML
30 INJECTION SUBCUTANEOUS ONCE
Status: COMPLETED | OUTPATIENT
Start: 2023-11-16 | End: 2023-11-16

## 2023-11-16 RX ORDER — DEXTROSE MONOHYDRATE, SODIUM CHLORIDE, AND POTASSIUM CHLORIDE 50; 1.49; 4.5 G/1000ML; G/1000ML; G/1000ML
INJECTION, SOLUTION INTRAVENOUS CONTINUOUS
Status: DISCONTINUED | OUTPATIENT
Start: 2023-11-16 | End: 2023-11-19 | Stop reason: ALTCHOICE

## 2023-11-16 RX ORDER — LEVOTHYROXINE SODIUM 0.05 MG/1
50 TABLET ORAL DAILY
Status: DISCONTINUED | OUTPATIENT
Start: 2023-11-16 | End: 2023-11-21 | Stop reason: HOSPADM

## 2023-11-16 RX ADMIN — FAMOTIDINE 20 MG: 10 INJECTION INTRAVENOUS at 21:10

## 2023-11-16 RX ADMIN — POTASSIUM CHLORIDE, DEXTROSE MONOHYDRATE AND SODIUM CHLORIDE: 150; 5; 450 INJECTION, SOLUTION INTRAVENOUS at 13:58

## 2023-11-16 RX ADMIN — LEVOTHYROXINE SODIUM 50 MCG: 0.05 TABLET ORAL at 08:44

## 2023-11-16 RX ADMIN — RIVAROXABAN 20 MG: 20 TABLET, FILM COATED ORAL at 18:44

## 2023-11-16 RX ADMIN — FAMOTIDINE 20 MG: 10 INJECTION INTRAVENOUS at 08:44

## 2023-11-16 RX ADMIN — PIPERACILLIN AND TAZOBACTAM 4500 MG: 4; .5 INJECTION, POWDER, LYOPHILIZED, FOR SOLUTION INTRAVENOUS at 05:59

## 2023-11-16 RX ADMIN — OXYCODONE 10 MG: 5 TABLET ORAL at 16:32

## 2023-11-16 RX ADMIN — MORPHINE SULFATE 2 MG: 2 INJECTION, SOLUTION INTRAMUSCULAR; INTRAVENOUS at 01:00

## 2023-11-16 RX ADMIN — ENOXAPARIN SODIUM 30 MG: 100 INJECTION SUBCUTANEOUS at 08:44

## 2023-11-16 RX ADMIN — SODIUM CHLORIDE, SODIUM LACTATE, POTASSIUM CHLORIDE, AND CALCIUM CHLORIDE: 600; 310; 30; 20 INJECTION, SOLUTION INTRAVENOUS at 11:30

## 2023-11-16 RX ADMIN — PIPERACILLIN AND TAZOBACTAM 4500 MG: 4; .5 INJECTION, POWDER, LYOPHILIZED, FOR SOLUTION INTRAVENOUS at 13:05

## 2023-11-16 RX ADMIN — OXYCODONE 10 MG: 5 TABLET ORAL at 05:56

## 2023-11-16 RX ADMIN — PIPERACILLIN AND TAZOBACTAM 4500 MG: 4; .5 INJECTION, POWDER, LYOPHILIZED, FOR SOLUTION INTRAVENOUS at 21:14

## 2023-11-16 ASSESSMENT — PAIN DESCRIPTION - DESCRIPTORS
DESCRIPTORS: ACHING
DESCRIPTORS: ACHING

## 2023-11-16 ASSESSMENT — PAIN SCALES - GENERAL
PAINLEVEL_OUTOF10: 6
PAINLEVEL_OUTOF10: 3
PAINLEVEL_OUTOF10: 3
PAINLEVEL_OUTOF10: 6
PAINLEVEL_OUTOF10: 4
PAINLEVEL_OUTOF10: 10

## 2023-11-16 ASSESSMENT — PAIN DESCRIPTION - LOCATION
LOCATION: ABDOMEN

## 2023-11-16 ASSESSMENT — PAIN DESCRIPTION - ORIENTATION: ORIENTATION: RIGHT

## 2023-11-16 NOTE — ANESTHESIA POSTPROCEDURE EVALUATION
Department of Anesthesiology  Postprocedure Note    Patient: Abi Cervantes  MRN: [id number]  YOB: 1945  Date of evaluation: 11/15/2023      Procedure Summary     Date: 11/15/23 Room / Location: [facility] MAIN OR  / [facility] MAIN OR    Anesthesia Start: 1620 Anesthesia Stop: 1849    Procedure: ATTEMPTED APPENDECTOMY LAPAROSCOPIC CONVERTED TO OPEN; ILEOCYSTECTOMY WITH APPENDECTOMY, ILEOCOLOSTOMY, DRAINAGE OF PERITONITIS (Abdomen) Diagnosis:       Appendicitis, unspecified appendicitis type      (Appendicitis, unspecified appendicitis type Graeme Carter)    Surgeons: Yane Lee MD Responsible Provider: Gayle Salazar MD    Anesthesia Type: General ASA Status: 3 - Emergent          Anesthesia Type: General    Eran Phase I: Eran Score: 8    Eran Phase II:        Anesthesia Post Evaluation    Patient location during evaluation: PACU  Patient participation: complete - patient participated  Level of consciousness: awake and alert  Airway patency: patent  Nausea: well controlled. Complications: no  Cardiovascular status: acceptable.   Respiratory status: acceptable  Hydration status: stable  Pain management: adequate

## 2023-11-16 NOTE — PERIOP NOTE
MD Moise at bedside with patient. Pt VSS stable. Pain and Nausea controlled at this time. Verbal sign out per MD when pacu care is completed. Plan of care continues.

## 2023-11-16 NOTE — PLAN OF CARE
Problem: Pain  Goal: Verbalizes/displays adequate comfort level or baseline comfort level  Outcome: Progressing     Problem: Discharge Planning  Goal: Discharge to home or other facility with appropriate resources  Outcome: Progressing  Flowsheets (Taken 11/15/2023 2015)  Discharge to home or other facility with appropriate resources:   Identify barriers to discharge with patient and caregiver   Identify discharge learning needs (meds, wound care, etc)     Problem: Safety - Adult  Goal: Free from fall injury  Outcome: Progressing

## 2023-11-16 NOTE — PROGRESS NOTES
[] [] [] []     Lower Body Bathing [] [] [] [] [] [] [] [] [] []     Toileting [] [] [] [] [] [] [] [] [] [] [place] in place   Upper Body Dressing [] [] [] [] [] [] [] [] [] []    Lower Body Dressing [] [] [] [] [] [] [] [] [] []    Feeding [] [] [x] [] [] [] [] [] [] [] Spooning ice chips   Grooming [] [] [x] [] [] [] [] [] [] [] Washing face in sitting   Personal Device Care [] [] [] [] [] [] [] [] [] []    Functional Mobility [] [] [] [] [x] [x] [] [] [] [] Hand held assistance today around room   I=Independent, Mod I=Modified Independent, S=Supervision/Setup, SBA=Standby Assistance, CGA=Contact Guard Assistance, Min=Minimal Assistance, Mod=Moderate Assistance, Max=Maximal Assistance, Total=Total Assistance, NT=Not Tested    PLAN:   FREQUENCY/DURATION   OT Plan of Care: 3 times/week for duration of hospital stay or until stated goals are met, whichever comes first.    PROBLEM LIST:   (Skilled intervention is medically necessary to address:)  Decreased ADL/Functional Activities  Decreased Activity Tolerance  Decreased Balance  Decreased Gait Ability  Decreased Safety Awareness  Decreased Strength  Decreased Transfer Abilities  Increased Pain   INTERVENTIONS PLANNED:  (Benefits and precautions of occupational therapy have been discussed with the patient.)  Self Care Training  Therapeutic Activity  Therapeutic Exercise/HEP  Neuromuscular Re-education  Manual Therapy  Education         TREATMENT:     EVALUATION: LOW COMPLEXITY: (Untimed Charge)    TREATMENT:   Therapeutic Activity (26 Minutes): Patient participated in therapeutic activities including bed mobility training, functional transfer training, adaptive equipment training, functional mobility of household distances, and sitting tolerance activity with minimal verbal cues, education, and adaptive equipment in order to increase independence, increase safety awareness, increase activity tolerance, and prepare for discharge home.      TREATMENT GRID:  N/A    AFTER

## 2023-11-16 NOTE — PROGRESS NOTES
ACUTE PHYSICAL THERAPY GOALS:   (Developed with and agreed upon by patient and/or caregiver.)  STG:  (1.)Ms. Augustin Robison will move from supine to sit and sit to supine  with SUPERVISION within 7 treatment day(s). (2.)Ms. Augustin Robison will transfer from bed to chair and chair to bed with SUPERVISION using the least restrictive device within 7 treatment day(s). (3.)Ms. Augustin Robison will ambulate with SUPERVISION for 650 feet with the least restrictive device within 7 treatment day(s). (4.)Pt. will climb up/down 13 steps with rail and SBA within 7 days  (5.)Pt. will perform AROM exs for 20 reps with 1 rest break to improve endurance within 7 days      ________________________________________________________________________________________________     PHYSICAL THERAPY Initial Assessment and AM  (Link to Caseload Tracking: PT Visit Days : 1  Acknowledge Orders  Time In/Out  PT Charge Capture  Rehab Caseload Tracker    Marianne De Luna is a 70 y.o. 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 Augustin Robison is admitted with cecum mass and is S/P ileocolostomy and drainage of peritonitis.  She is experiencing a decline in her

## 2023-11-16 NOTE — OP NOTE
[facility]  OPERATIVE REPORT    Name:  Gamal Robison  MR#:  [id number]  :  1945  ACCOUNT #:  [de-identified]  DATE OF SERVICE:  11/15/2023    PREOPERATIVE DIAGNOSES:  1. Severe acute and chronic RLQ abd pain. 2.  Markedly abnormal CT scan with thickened ileum, jejunum, cecum, and appendix. 3.  Appendix dilated to 2 cm on CT  4. Localized peritonitis. 5.  History of iron deficiency anemia name. 6. History of recent bilateral pulmonary emboli in 2023 on anticoagulation  7. s/p recent laparoscopic right inguinal hernia repair with mesh (Dr Nomi Tolentino)  8. No colonoscopy in approximately 12 years or more  9. Coagulopathy secondary to Xarelto use for PE     POSTOPERATIVE DIAGNOSES:  1. Severe acute and chronic RLQ abd pain. 2.  Markedly abnormal CT scan with thickened ileum, jejunum, cecum, and appendix. 3.  Appendix dilated to 2 cm on CT  4. Localized peritonitis. 5.  History of iron deficiency anemia name. 6. History of recent bilateral pulmonary emboli in 2023 on anticoagulation  7. s/p recent laparoscopic right inguinal hernia repair with mesh (Dr Nomi Tolentino)  8. No colonoscopy in approximately 12 years or more  9. Coagulopathy secondary to Xarelto use for PE       PROCEDURE PERFORMED:  1. Open drainage of localized peritonitis (CPT 25430)  2. Laparoscopic converted open ileocecectomy with ileocolostomy for a large cecal mass not described on pre-op CT (CPT 08186-15-94)  3. Additional small bowel resection (ileum) not requiring a separate anastomosis (CPT 00125-49-12)    SURGEON:  Boaz Cabrera MD  ASSISTANT:  None. ANESTHESIA:  General.  COMPLICATIONS:  None. SPECIMENS REMOVED:  Culture sent to Microbiology and ileocecectomy with mesenteric nodes sent to Pathology along with additional ileum. IMPLANTS:  A 19 round Franky drain. ESTIMATED BLOOD LOSS:  Less than 75 mL.     OPERATIVE PROCEDURE: Ms. Zuleima Koenig is a 59-year-old female who came to the emergency room with lower quadrant to clear this area to lower the risk of postoperative abscess.  We planned on placing a 19 round Franky drain at the end of the case to drain this area as well.  There was an abundant amount of fibrinopurulent debris and turbid infected fluid in the right lower quadrant and the pelvis.  A culture was sent to Microbiology during the case.  Open drainage of localized peritonitis was performed.        Once we completed open drainage of localized peritonitis in the right lower quadrant we could proceed with the surgery.  We once again palpated the very large cecal mass with the surrounding adenopathy.  It appeared that this large mass was blocking the orifice of the appendix causing the appendiceal dilation to 2 cm as noted on the preoperative CT imaging.  The adenopathy was quite profound.  The terminal ileum cecum and ascending colon were mobilized from the retroperitoneal attachments with great care.  The appendix was tethered into the cecal mass.  There would have been no way laparoscopically to have removed the appendix safely without also removing the cecum.  More importantly, it was suspected that the patient had colon cancer which would explain the iron deficiency anemia, the right lower quadrant pain, and the bilateral pulmonary emboli she developed recently.   Once we mobilized the ileum, the cecum, and the ascending colon we divided the terminal ileum about 7 to 8 cm above the ileocecal valve with Blue open 75mm CIRILO stapler and then divided the ascending colon with a blue 75 mm stapler.  We mobilized the remainder of the right colon for exposure and then divided the mesentery with a LigaSure.  We included in our resection specimen large mesenteric nodes extending up to the ileocolic vessel all the way to the region of the head of the pancreas.  We attempted to remove these in continuity with the specimen.  The LigaSure was used to divide the ileocecal mesentery.  The ileocolic vessels were

## 2023-11-16 NOTE — PROGRESS NOTES
Progress Note   Haven Hui  MRN: [id number]  :1945  Age:78 y.o.    HPI: Haven Hui is a 78 y.o. female with h/o Fe deficiency anemia and bilat PE   who is s/p ileocectomy with ileocolostomy and drainage of localized peritonitis on 11/15/23 for cecal mass with acute RLQ pain.      Prior to surgery she originally came to the ER on 11/15/23 complaining of progressive lower abd pain for 1-2months   which became severe and constant for 3 days prior to coming to the ER.    WBC 18.2k; Hgb 9.0  BUN/Cr 20/0.88  LFTs/Lipase  normal   Lactic acid 1.4      She is s/p laparoscopic RIH with 3D mesh mesh and 5mm ProTAck device 23 by Dr Ricks  She had post-op bilateral PEs diagnosed on CT on 23 and treated with Xarelto daily  Her last dose of Xarelto was on 23    She had the below CT and gen surgery was consulted           11/15/23 CT abd/pelvis with IV contrast  EXAMINATION: CT  ABDOMEN / PELVIS   11/15/2023 1:40 PM      LOWER THORAX: Subsegmental atelectasis and/or scarring in the lung bases.     LIVER: There is minimal periportal edema. New 1.4 cm hypoattenuating area in the left hepatic lobe.    BILIARY: Gallbladder is unremarkable. No intrahepatic or extrahepatic biliary ductal dilation.  SPLEEN: No splenomegaly or concerning lesion.  PANCREAS: No pancreatic duct dilation or mass.  ADRENALS: No adrenal nodules or hypertrophy.     URINARY: Kidneys are symmetric in size and enhancement. 1.1 severe indeterminate density exophytic lesion on the right lower pole remains stable.   1.0 cm cyst on the left kidney. No hydronephrosis or left urolithiasis. Ureters and urinary bladder are within normal limits.     BOWEL: The appendix is dilated to 2.0 cm in diameter with wall thickening and periappendiceal fat stranding and free fluid.   The largest pocket of free fluid measures up to approximately 2.7 cm, though no discrete rim enhancement to suggest abscess.   No evidence of free air.  treatment;    or  ?2or more stable chronic illnesses;    or  ?1undiagnosed new problem with uncertain prognosis;    or  ?1acute illness with systemic symptoms;    or  ?1acute complicated injury   Moderate  (Must meet the requirements of at least 1 out of 3 categories)  Category 1: Tests, documents, or independent historian(s)  ? Any combination of 3 from the following:   ?Review of prior external note(s) from each unique source*;  ?Review of the result(s) of each unique test*;  ?Ordering of each unique test*;  ?Assessment requiring an independent historian(s)    or  Category 2: Independent interpretation of tests   ? Independent interpretation of a test performed by another physician/other qualified health care professional (not separately reported);     or  Category 3: Discussion of management or test interpretation  ? Discussion of management or test interpretation with external physician/other qualified health care professional/appropriate source (not separately reported)   Moderate risk of morbidity from additional diagnostic testing or treatment  Examples only:  ?Prescription drug management   ? Decision regarding minor surgery with identified patient or procedure risk factors  ? Decision regarding elective major surgery without identified patient or procedure risk factors   ? Diagnosis or treatment significantly limited by social determinants of health       [id number] High High  ? 1or more chronic illnesses with severe exacerbation, progression, or side effects of treatment;    or  ?1 acute or chronic illness or injury that poses a threat to life or bodily function   Extensive  (Must meet the requirements of at least 2 out of 3 categories)  Category 1: Tests, documents, or independent historian(s)  ? Any combination of 3 from the following:   ?Review of prior external note(s) from each unique source*;  ?Review of the result(s) of each unique test*;   ?Ordering of each unique test*;   ?Assessment requiring an

## 2023-11-16 NOTE — PROGRESS NOTES
PT was in bed with Friend at bedside. [id number] [address] introduced self. PT shared about health and hospitalization. PT shared concerns and hopes.  offered empathetic spiritual presence, active listening, and therapeutic communication. PT expressed comfort in Angle, Friends, W. RNan Gladys, and Prayer. PT is Christian. [address] [address] offered prayer and support. Rev. Denis Reynoso M.Div.

## 2023-11-16 NOTE — BRIEF OP NOTE
BRIEF OPERATIVE NOTE    Date of Procedure:  11/15/2023    Preoperative Diagnosis:   Lower abd pain  Abnormal jejunum, ileal, colon, and appendix on CT  Recent Bilateral PE    Postoperative Diagnosis:   Ileocecal mass  Lower abd pain  Abnormal jejunum, ileal, colon, and appendix on CT  Recent Bilateral PE     Procedure:   Lap-->Open ileocectomy with ileocolostomy for cecal mass  Open drainage of localized peritonitis  Additional small bowel (ileal) resection      Surgeon(s) and Role:     * Boaz Cabrera MD - Primary  Anesthesia: General  Estimated Blood Loss: <75cc  Specimens:   ID Type Source Tests Collected by Time Destination   1 : PERITONITIS Swab Peritoneum CULTURE, ANAEROBIC, CULTURE, WOUND Boaz Cabrera MD 11/15/2023 1711    A : ILEOCECAL MASS MASS WITH DILATED APPENDIX R/O MALIGNANCY Tissue Cecum SURGICAL PATHOLOGY Boaz Cabrera MD 11/15/2023 1750    B : ADDITIONAL ILEUM Tissue Ileum SURGICAL PATHOLOGY Boaz Cabrera MD 11/15/2023 1800      Findings: see op note   Complications: none  Implants: 19 round Franky drain

## 2023-11-16 NOTE — PROGRESS NOTES
RN educated patient on use of incentive spirometer and importance of compliance. Patient provided opportunity to ask questions. Patient demonstrated proper understanding of device.

## 2023-11-16 NOTE — PERIOP NOTE
TRANSFER - OUT REPORT:    Verbal report given to RN on [facility]  being transferred to room 333 for routine progression of patient care       Report consisted of patient's Situation, Background, Assessment and   Recommendations(SBAR). Information from the following report(s) Nurse Handoff Report, Adult Overview, and MAR was reviewed with the receiving nurse. Lines:   Peripheral IV 11/15/23 Left;Proximal Forearm (Active)   Site Assessment Clean, dry & intact 11/15/23 1849   Line Status Infusing 11/15/23 [facility] Connections checked and tightened 11/15/23 1849   Phlebitis Assessment No symptoms 11/15/23 1849   Infiltration Assessment 0 11/15/23 1849   Alcohol Cap Used No 11/15/23 1849   Dressing Status Clean, dry & intact 11/15/23 1849   Dressing Type Transparent 11/15/23 1849        Opportunity for questions and clarification was provided.       Patient transported with:  O2 @ 2lpm

## 2023-11-16 NOTE — PROGRESS NOTES
TRANSFER - IN REPORT:    Verbal report received from Carlos Mccollum RN on [facility]  being received from PACU for routine post-op      Report consisted of patient's Situation, Background, Assessment and   Recommendations(SBAR). Information from the following report(s) Nurse Handoff Report, Surgery Report, MAR, and Event Log was reviewed with the receiving nurse. Opportunity for questions and clarification was provided. Assessment completed upon patient's arrival to unit and care assumed.

## 2023-11-17 ENCOUNTER — APPOINTMENT (OUTPATIENT)
Dept: NON INVASIVE DIAGNOSTICS | Age: 78
DRG: 329 | End: 2023-11-17
Attending: SURGERY
Payer: MEDICARE

## 2023-11-17 ENCOUNTER — APPOINTMENT (OUTPATIENT)
Dept: GENERAL RADIOLOGY | Age: 78
DRG: 329 | End: 2023-11-17
Payer: MEDICARE

## 2023-11-17 ENCOUNTER — APPOINTMENT (OUTPATIENT)
Dept: CT IMAGING | Age: 78
DRG: 329 | End: 2023-11-17
Payer: MEDICARE

## 2023-11-17 PROBLEM — J90 PLEURAL EFFUSION, BILATERAL: Status: ACTIVE | Noted: 2023-11-17

## 2023-11-17 PROBLEM — J96.01 ACUTE HYPOXEMIC RESPIRATORY FAILURE (HCC): Status: ACTIVE | Noted: 2023-11-17

## 2023-11-17 PROBLEM — C18.2 MALIGNANT NEOPLASM OF ASCENDING COLON (HCC): Status: ACTIVE | Noted: 2023-11-15

## 2023-11-17 PROBLEM — E87.70 HYPERVOLEMIA: Status: ACTIVE | Noted: 2023-11-17

## 2023-11-17 LAB
ANION GAP SERPL CALC-SCNC: 6 MMOL/L (ref 2–11)
APTT PPP: 42.4 SEC (ref 24.5–34.2)
ARTERIAL PATENCY WRIST A: POSITIVE
BASE DEFICIT BLD-SCNC: 2 MMOL/L
BASOPHILS # BLD: 0 K/UL (ref 0–0.2)
BASOPHILS NFR BLD: 0 % (ref 0–2)
BDY SITE: ABNORMAL
BUN SERPL-MCNC: 11 MG/DL (ref 8–23)
CALCIUM SERPL-MCNC: 8 MG/DL (ref 8.3–10.4)
CEA SERPL-MCNC: 2.9 NG/ML (ref 0–3)
CHLORIDE SERPL-SCNC: 110 MMOL/L (ref 101–110)
CO2 SERPL-SCNC: 26 MMOL/L (ref 21–32)
CREAT SERPL-MCNC: 0.74 MG/DL (ref 0.6–1)
D DIMER PPP FEU-MCNC: 3.47 UG/ML(FEU)
DIFFERENTIAL METHOD BLD: ABNORMAL
ECHO BSA: 1.54 M2
ECHO LV EDV A2C: 65 ML
ECHO LV EDV A4C: 75 ML
ECHO LV EDV INDEX A4C: 49 ML/M2
ECHO LV EDV NDEX A2C: 42 ML/M2
ECHO LV EJECTION FRACTION A2C: 52 %
ECHO LV EJECTION FRACTION A4C: 70 %
ECHO LV EJECTION FRACTION BIPLANE: 62 % (ref 55–100)
ECHO LV ESV A2C: 32 ML
ECHO LV ESV A4C: 22 ML
ECHO LV ESV INDEX A2C: 21 ML/M2
ECHO LV ESV INDEX A4C: 14 ML/M2
EKG ATRIAL RATE: 83 BPM
EKG DIAGNOSIS: NORMAL
EKG P AXIS: 78 DEGREES
EKG P-R INTERVAL: 160 MS
EKG Q-T INTERVAL: 397 MS
EKG QRS DURATION: 95 MS
EKG QTC CALCULATION (BAZETT): 470 MS
EKG R AXIS: 75 DEGREES
EKG T AXIS: 63 DEGREES
EKG VENTRICULAR RATE: 84 BPM
EOSINOPHIL # BLD: 0.1 K/UL (ref 0–0.8)
EOSINOPHIL NFR BLD: 1 % (ref 0.5–7.8)
ERYTHROCYTE [DISTWIDTH] IN BLOOD BY AUTOMATED COUNT: 13.4 % (ref 11.9–14.6)
GLUCOSE SERPL-MCNC: 133 MG/DL (ref 65–100)
HCO3 BLD-SCNC: 21.8 MMOL/L (ref 22–26)
HCT VFR BLD AUTO: 28.2 % (ref 35.8–46.3)
HGB BLD-MCNC: 8.3 G/DL (ref 11.7–15.4)
IMM GRANULOCYTES # BLD AUTO: 0.1 K/UL (ref 0–0.5)
IMM GRANULOCYTES NFR BLD AUTO: 1 % (ref 0–5)
INR PPP: 2.5
LIPASE SERPL-CCNC: 63 U/L (ref 73–393)
LYMPHOCYTES # BLD: 1.3 K/UL (ref 0.5–4.6)
LYMPHOCYTES NFR BLD: 9 % (ref 13–44)
MCH RBC QN AUTO: 28.4 PG (ref 26.1–32.9)
MCHC RBC AUTO-ENTMCNC: 29.4 G/DL (ref 31.4–35)
MCV RBC AUTO: 96.6 FL (ref 82–102)
MONOCYTES # BLD: 0.7 K/UL (ref 0.1–1.3)
MONOCYTES NFR BLD: 5 % (ref 4–12)
NEUTS SEG # BLD: 11.9 K/UL (ref 1.7–8.2)
NEUTS SEG NFR BLD: 84 % (ref 43–78)
NRBC # BLD: 0 K/UL (ref 0–0.2)
NT PRO BNP: 2109 PG/ML
PCO2 BLD: 32.8 MMHG (ref 35–45)
PH BLD: 7.43 (ref 7.35–7.45)
PLATELET # BLD AUTO: 337 K/UL (ref 150–450)
PMV BLD AUTO: 10.5 FL (ref 9.4–12.3)
PO2 BLD: 58 MMHG (ref 75–100)
POTASSIUM SERPL-SCNC: 3.7 MMOL/L (ref 3.5–5.1)
PROTHROMBIN TIME: 26.6 SEC (ref 12.6–14.3)
RBC # BLD AUTO: 2.92 M/UL (ref 4.05–5.2)
SAO2 % BLD: 90.7 % (ref 95–98)
SERVICE CMNT-IMP: ABNORMAL
SERVICE CMNT-IMP: ABNORMAL
SODIUM SERPL-SCNC: 142 MMOL/L (ref 133–143)
SPECIMEN TYPE: ABNORMAL
UFH PPP CHRO-ACNC: >1.1 IU/ML (ref 0.3–0.7)
WBC # BLD AUTO: 14.1 K/UL (ref 4.3–11.1)

## 2023-11-17 PROCEDURE — 6360000002 HC RX W HCPCS: Performed by: FAMILY MEDICINE

## 2023-11-17 PROCEDURE — C8924 2D TTE W OR W/O FOL W/CON,FU: HCPCS

## 2023-11-17 PROCEDURE — 85730 THROMBOPLASTIN TIME PARTIAL: CPT

## 2023-11-17 PROCEDURE — 94761 N-INVAS EAR/PLS OXIMETRY MLT: CPT

## 2023-11-17 PROCEDURE — 6370000000 HC RX 637 (ALT 250 FOR IP): Performed by: SURGERY

## 2023-11-17 PROCEDURE — 6360000002 HC RX W HCPCS

## 2023-11-17 PROCEDURE — 99223 1ST HOSP IP/OBS HIGH 75: CPT | Performed by: INTERNAL MEDICINE

## 2023-11-17 PROCEDURE — 6360000002 HC RX W HCPCS: Performed by: SURGERY

## 2023-11-17 PROCEDURE — 85379 FIBRIN DEGRADATION QUANT: CPT

## 2023-11-17 PROCEDURE — 2500000003 HC RX 250 WO HCPCS: Performed by: SURGERY

## 2023-11-17 PROCEDURE — 6360000004 HC RX CONTRAST MEDICATION: Performed by: FAMILY MEDICINE

## 2023-11-17 PROCEDURE — 71260 CT THORAX DX C+: CPT

## 2023-11-17 PROCEDURE — 83880 ASSAY OF NATRIURETIC PEPTIDE: CPT

## 2023-11-17 PROCEDURE — 2500000003 HC RX 250 WO HCPCS: Performed by: FAMILY MEDICINE

## 2023-11-17 PROCEDURE — 2580000003 HC RX 258: Performed by: FAMILY MEDICINE

## 2023-11-17 PROCEDURE — 2700000000 HC OXYGEN THERAPY PER DAY

## 2023-11-17 PROCEDURE — 85610 PROTHROMBIN TIME: CPT

## 2023-11-17 PROCEDURE — 93005 ELECTROCARDIOGRAM TRACING: CPT | Performed by: SURGERY

## 2023-11-17 PROCEDURE — 74018 RADEX ABDOMEN 1 VIEW: CPT

## 2023-11-17 PROCEDURE — 36600 WITHDRAWAL OF ARTERIAL BLOOD: CPT

## 2023-11-17 PROCEDURE — 6370000000 HC RX 637 (ALT 250 FOR IP): Performed by: FAMILY MEDICINE

## 2023-11-17 PROCEDURE — 85520 HEPARIN ASSAY: CPT

## 2023-11-17 PROCEDURE — 6360000002 HC RX W HCPCS: Performed by: NURSE PRACTITIONER

## 2023-11-17 PROCEDURE — 2580000003 HC RX 258: Performed by: SURGERY

## 2023-11-17 PROCEDURE — 2500000003 HC RX 250 WO HCPCS: Performed by: INTERNAL MEDICINE

## 2023-11-17 PROCEDURE — 82378 CARCINOEMBRYONIC ANTIGEN: CPT

## 2023-11-17 PROCEDURE — 93308 TTE F-UP OR LMTD: CPT | Performed by: INTERNAL MEDICINE

## 2023-11-17 PROCEDURE — 6360000002 HC RX W HCPCS: Performed by: INTERNAL MEDICINE

## 2023-11-17 PROCEDURE — 80048 BASIC METABOLIC PNL TOTAL CA: CPT

## 2023-11-17 PROCEDURE — 71045 X-RAY EXAM CHEST 1 VIEW: CPT

## 2023-11-17 PROCEDURE — 82803 BLOOD GASES ANY COMBINATION: CPT

## 2023-11-17 PROCEDURE — A4216 STERILE WATER/SALINE, 10 ML: HCPCS | Performed by: SURGERY

## 2023-11-17 PROCEDURE — 6370000000 HC RX 637 (ALT 250 FOR IP): Performed by: NURSE PRACTITIONER

## 2023-11-17 PROCEDURE — 83690 ASSAY OF LIPASE: CPT

## 2023-11-17 PROCEDURE — 93010 ELECTROCARDIOGRAM REPORT: CPT | Performed by: INTERNAL MEDICINE

## 2023-11-17 PROCEDURE — 2000000000 HC ICU R&B

## 2023-11-17 PROCEDURE — 85025 COMPLETE CBC W/AUTO DIFF WBC: CPT

## 2023-11-17 PROCEDURE — 94640 AIRWAY INHALATION TREATMENT: CPT

## 2023-11-17 PROCEDURE — 6360000004 HC RX CONTRAST MEDICATION: Performed by: SURGERY

## 2023-11-17 RX ORDER — HEPARIN SODIUM 1000 [USP'U]/ML
40 INJECTION, SOLUTION INTRAVENOUS; SUBCUTANEOUS PRN
Status: DISCONTINUED | OUTPATIENT
Start: 2023-11-17 | End: 2023-11-17

## 2023-11-17 RX ORDER — OXYCODONE HCL 5 MG/5 ML
10 SOLUTION, ORAL ORAL
Status: DISCONTINUED | OUTPATIENT
Start: 2023-11-17 | End: 2023-11-21 | Stop reason: HOSPADM

## 2023-11-17 RX ORDER — BUMETANIDE 0.25 MG/ML
1 INJECTION INTRAMUSCULAR; INTRAVENOUS 2 TIMES DAILY
Status: DISCONTINUED | OUTPATIENT
Start: 2023-11-17 | End: 2023-11-18

## 2023-11-17 RX ORDER — ENOXAPARIN SODIUM 100 MG/ML
40 INJECTION SUBCUTANEOUS DAILY
Status: DISCONTINUED | OUTPATIENT
Start: 2023-11-17 | End: 2023-11-17

## 2023-11-17 RX ORDER — FUROSEMIDE 10 MG/ML
INJECTION INTRAMUSCULAR; INTRAVENOUS
Status: COMPLETED
Start: 2023-11-17 | End: 2023-11-17

## 2023-11-17 RX ORDER — MORPHINE SULFATE 2 MG/ML
2 INJECTION, SOLUTION INTRAMUSCULAR; INTRAVENOUS ONCE
Status: COMPLETED | OUTPATIENT
Start: 2023-11-17 | End: 2023-11-17

## 2023-11-17 RX ORDER — HEPARIN SODIUM 10000 [USP'U]/100ML
5-30 INJECTION, SOLUTION INTRAVENOUS CONTINUOUS
Status: DISCONTINUED | OUTPATIENT
Start: 2023-11-17 | End: 2023-11-17

## 2023-11-17 RX ORDER — FUROSEMIDE 10 MG/ML
20 INJECTION INTRAMUSCULAR; INTRAVENOUS 2 TIMES DAILY
Status: DISCONTINUED | OUTPATIENT
Start: 2023-11-17 | End: 2023-11-17

## 2023-11-17 RX ORDER — ACETAMINOPHEN 500 MG
1000 TABLET ORAL EVERY 6 HOURS SCHEDULED
Status: DISCONTINUED | OUTPATIENT
Start: 2023-11-17 | End: 2023-11-20

## 2023-11-17 RX ORDER — HYDROMORPHONE HYDROCHLORIDE 1 MG/ML
0.5 INJECTION, SOLUTION INTRAMUSCULAR; INTRAVENOUS; SUBCUTANEOUS
Status: DISCONTINUED | OUTPATIENT
Start: 2023-11-17 | End: 2023-11-21 | Stop reason: HOSPADM

## 2023-11-17 RX ORDER — ACETAMINOPHEN 325 MG/1
650 TABLET ORAL EVERY 8 HOURS
Status: DISCONTINUED | OUTPATIENT
Start: 2023-11-17 | End: 2023-11-17

## 2023-11-17 RX ORDER — HEPARIN SODIUM 1000 [USP'U]/ML
80 INJECTION, SOLUTION INTRAVENOUS; SUBCUTANEOUS PRN
Status: DISCONTINUED | OUTPATIENT
Start: 2023-11-17 | End: 2023-11-17

## 2023-11-17 RX ORDER — IBUPROFEN 400 MG/1
400 TABLET ORAL
Status: COMPLETED | OUTPATIENT
Start: 2023-11-17 | End: 2023-11-17

## 2023-11-17 RX ORDER — FUROSEMIDE 10 MG/ML
20 INJECTION INTRAMUSCULAR; INTRAVENOUS ONCE
Status: COMPLETED | OUTPATIENT
Start: 2023-11-17 | End: 2023-11-17

## 2023-11-17 RX ORDER — HYDROMORPHONE HYDROCHLORIDE 1 MG/ML
1 INJECTION, SOLUTION INTRAMUSCULAR; INTRAVENOUS; SUBCUTANEOUS
Status: DISCONTINUED | OUTPATIENT
Start: 2023-11-17 | End: 2023-11-21 | Stop reason: HOSPADM

## 2023-11-17 RX ORDER — ALBUTEROL SULFATE 2.5 MG/3ML
SOLUTION RESPIRATORY (INHALATION)
Status: COMPLETED
Start: 2023-11-17 | End: 2023-11-17

## 2023-11-17 RX ADMIN — POTASSIUM CHLORIDE, DEXTROSE MONOHYDRATE AND SODIUM CHLORIDE: 150; 5; 450 INJECTION, SOLUTION INTRAVENOUS at 08:30

## 2023-11-17 RX ADMIN — ACETAMINOPHEN 650 MG: 325 TABLET, FILM COATED ORAL at 11:59

## 2023-11-17 RX ADMIN — PIPERACILLIN AND TAZOBACTAM 3375 MG: 3; .375 INJECTION, POWDER, LYOPHILIZED, FOR SOLUTION INTRAVENOUS at 20:24

## 2023-11-17 RX ADMIN — LEVOTHYROXINE SODIUM 50 MCG: 0.05 TABLET ORAL at 05:32

## 2023-11-17 RX ADMIN — APIXABAN 5 MG: 5 TABLET, FILM COATED ORAL at 20:13

## 2023-11-17 RX ADMIN — IOPAMIDOL 100 ML: 755 INJECTION, SOLUTION INTRAVENOUS at 08:03

## 2023-11-17 RX ADMIN — OXYCODONE HYDROCHLORIDE 10 MG: 5 SOLUTION ORAL at 20:13

## 2023-11-17 RX ADMIN — FUROSEMIDE 20 MG: 10 INJECTION, SOLUTION INTRAMUSCULAR; INTRAVENOUS at 08:25

## 2023-11-17 RX ADMIN — FAMOTIDINE 20 MG: 10 INJECTION INTRAVENOUS at 20:12

## 2023-11-17 RX ADMIN — ALBUTEROL SULFATE 2.5 MG: 2.5 SOLUTION RESPIRATORY (INHALATION) at 05:59

## 2023-11-17 RX ADMIN — SODIUM CHLORIDE, PRESERVATIVE FREE 0.6 ML: 5 INJECTION INTRAVENOUS at 18:00

## 2023-11-17 RX ADMIN — IBUPROFEN 400 MG: 400 TABLET, FILM COATED ORAL at 11:59

## 2023-11-17 RX ADMIN — MORPHINE SULFATE 2 MG: 2 INJECTION, SOLUTION INTRAMUSCULAR; INTRAVENOUS at 08:34

## 2023-11-17 RX ADMIN — ONDANSETRON 4 MG: 2 INJECTION INTRAMUSCULAR; INTRAVENOUS at 08:24

## 2023-11-17 RX ADMIN — HEPARIN SODIUM 18 UNITS/KG/HR: 10000 INJECTION, SOLUTION INTRAVENOUS at 08:21

## 2023-11-17 RX ADMIN — TUBERCULIN PURIFIED PROTEIN DERIVATIVE 5 UNITS: 5 INJECTION, SOLUTION INTRADERMAL at 17:32

## 2023-11-17 RX ADMIN — MORPHINE SULFATE 4 MG: 4 INJECTION, SOLUTION INTRAMUSCULAR; INTRAVENOUS at 01:31

## 2023-11-17 RX ADMIN — ACETAMINOPHEN 1000 MG: 500 TABLET, FILM COATED ORAL at 17:35

## 2023-11-17 RX ADMIN — PIPERACILLIN AND TAZOBACTAM 3375 MG: 3; .375 INJECTION, POWDER, LYOPHILIZED, FOR SOLUTION INTRAVENOUS at 14:01

## 2023-11-17 RX ADMIN — FAMOTIDINE 20 MG: 10 INJECTION INTRAVENOUS at 08:24

## 2023-11-17 RX ADMIN — ACETAMINOPHEN 1000 MG: 500 TABLET, FILM COATED ORAL at 06:41

## 2023-11-17 RX ADMIN — FUROSEMIDE 20 MG: 10 INJECTION INTRAMUSCULAR; INTRAVENOUS at 08:25

## 2023-11-17 RX ADMIN — POTASSIUM CHLORIDE, DEXTROSE MONOHYDRATE AND SODIUM CHLORIDE: 150; 5; 450 INJECTION, SOLUTION INTRAVENOUS at 18:13

## 2023-11-17 RX ADMIN — POTASSIUM CHLORIDE, DEXTROSE MONOHYDRATE AND SODIUM CHLORIDE: 150; 5; 450 INJECTION, SOLUTION INTRAVENOUS at 01:25

## 2023-11-17 RX ADMIN — PIPERACILLIN AND TAZOBACTAM 4500 MG: 4; .5 INJECTION, POWDER, LYOPHILIZED, FOR SOLUTION INTRAVENOUS at 05:32

## 2023-11-17 RX ADMIN — HYDROMORPHONE HYDROCHLORIDE 1 MG: 1 INJECTION, SOLUTION INTRAMUSCULAR; INTRAVENOUS; SUBCUTANEOUS at 18:16

## 2023-11-17 RX ADMIN — BUMETANIDE 1 MG: 0.25 INJECTION INTRAMUSCULAR; INTRAVENOUS at 20:12

## 2023-11-17 ASSESSMENT — PAIN SCALES - GENERAL
PAINLEVEL_OUTOF10: 5
PAINLEVEL_OUTOF10: 3
PAINLEVEL_OUTOF10: 8
PAINLEVEL_OUTOF10: 9
PAINLEVEL_OUTOF10: 8
PAINLEVEL_OUTOF10: 6
PAINLEVEL_OUTOF10: 0

## 2023-11-17 ASSESSMENT — PAIN DESCRIPTION - LOCATION
LOCATION: HEAD
LOCATION: ABDOMEN
LOCATION: HEAD
LOCATION: ABDOMEN;INCISION
LOCATION: ABDOMEN
LOCATION: HEAD

## 2023-11-17 ASSESSMENT — PAIN DESCRIPTION - ORIENTATION
ORIENTATION: RIGHT
ORIENTATION: RIGHT;ANTERIOR

## 2023-11-17 ASSESSMENT — PAIN DESCRIPTION - DESCRIPTORS
DESCRIPTORS: OTHER (COMMENT)
DESCRIPTORS: ACHING;THROBBING
DESCRIPTORS: ACHING
DESCRIPTORS: SHARP
DESCRIPTORS: ACHING

## 2023-11-17 ASSESSMENT — PAIN DESCRIPTION - ONSET: ONSET: ON-GOING

## 2023-11-17 NOTE — PROGRESS NOTES
11/16/23 2118   Oxygen Therapy/Pulse Ox   O2 Therapy Oxygen   $Oxygen $Daily Charge   O2 Device Nasal cannula   O2 Flow Rate (L/min) 3 L/min   Pulse 77   Respirations 18   SpO2 94 %   Pulse Oximeter Device Mode Intermittent   $Pulse Oximeter $Spot check (single)     O2 spot check complete. Patient on 2L NC on my arrival. O2 saturations reading 89-90%. Increased to 3L at this time with saturations rising to 94-95%. No distress or needs identified at this time.

## 2023-11-17 NOTE — CONSULTS
interpreted by an American Board of Radiology certified radiologist with subspecialty training. If  there are any questions regarding this exam please feel free to contact a radiologist directly at [phone]. Slot: [phone]  Musa Riggs M.D. 11/17/2023 6:43:00 AM    CT ABDOMEN PELVIS W IV CONTRAST Additional Contrast? None    Result Date: 11/15/2023  EXAMINATION: CT  ABDOMEN / PELVIS   11/15/2023 1:40 PM ACCESSION NUMBER:  [id number] INDICATION:  Abdominal pain, leukocytosis COMPARISON: CT abdomen, 9/28/2023. CT abdomen and pelvis, 3/22/2023 TECHNIQUE: Contiguous axial computed tomographic images were obtained from the domes of the diaphragm to the symphysis pubis after the intravenous administration of 100 mL of Isovue 370. Coronal and sagittal reconstructions were also performed. Radiation dose reduction techniques were used for this study:  Our CT scanners use one or all of the following: Automated exposure control, adjustment of the mA and/or kVp according to patient's size, iterative reconstruction. FINDINGS: LOWER THORAX: Subsegmental atelectasis and/or scarring in the lung bases. LIVER: There is minimal periportal edema. New 1.4 cm hypoattenuating area in the left hepatic lobe. BILIARY: Gallbladder is unremarkable. No intrahepatic or extrahepatic biliary ductal dilation. SPLEEN: No splenomegaly or concerning lesion. PANCREAS: No pancreatic duct dilation or mass. ADRENALS: No adrenal nodules or hypertrophy. URINARY: Kidneys are symmetric in size and enhancement. 1.1 severe indeterminate density exophytic lesion on the right lower pole remains stable. 1.0 cm cyst on the left kidney. No hydronephrosis or left urolithiasis. Ureters and urinary bladder are within normal limits. BOWEL: The appendix is dilated to 2.0 cm in diameter with wall thickening and periappendiceal fat stranding and free fluid. The largest pocket of free fluid measures up to approximately 2.7 cm, though no discrete rim enhancement to  suggest abscess. No evidence of free air. There is wall thickening and inflammatory changes involving the adjacent duodenum, jejunum, ileum, and cecum which is most likely reactive. No evidence of bowel obstruction. Colonic diverticulosis without evidence of diverticulitis. VASCULAR: The abdominal aorta and iliac arterial system are nonaneurysmal with mild atherosclerotic calcifications. NODES: No lymphadenopathy. FLUID: No free intraperitoneal fluid. REPRODUCTIVE: Unremarkable BONES/SOFT TISSUES: No acute or aggressive osseous abnormality. Regional soft tissues are unremarkable. 1. Acute appendicitis with severe surrounding periappendiceal inflammatory changes including free fluid but no discrete organized abscess or free air to confirm perforation. 2. Inflammatory changes involving the adjacent small bowel and cecum which is most likely reactive. 3. New 1.4 cm hypoattenuating lesion in the liver is indeterminate. A small developing hepatic abscess is a consideration. Consider short-term follow-up CT to reassess. 4. Colonic diverticulosis. Echocardiogram:  08/07/23    TRANSTHORACIC ECHOCARDIOGRAM (TTE) COMPLETE (CONTRAST/BUBBLE/3D PRN) 08/08/2023 11:57 AM (Final)    Interpretation Summary    Left Ventricle: Normal left ventricular systolic function with a visually estimated EF of 60 - 65%. Left ventricle size is normal. Normal wall thickness. Normal wall motion. Normal diastolic function. Mitral Valve: Mild to moderate regurgitation with a posterior directed jet. RVSP=29mmHg    Signed by:  Sindhu Mayes MD on 8/8/2023 11:57 AM        Signed:  Domingo Moulton MD

## 2023-11-17 NOTE — PROGRESS NOTES
edema. New 1.4 cm hypoattenuating area in the left hepatic lobe. BILIARY: Gallbladder is unremarkable. No intrahepatic or extrahepatic biliary ductal dilation. SPLEEN: No splenomegaly or concerning lesion. PANCREAS: No pancreatic duct dilation or mass. ADRENALS: No adrenal nodules or hypertrophy. URINARY: Kidneys are symmetric in size and enhancement. 1.1 severe indeterminate density exophytic lesion on the right lower pole remains stable. 1.0 cm cyst on the left kidney. No hydronephrosis or left urolithiasis. Ureters and urinary bladder are within normal limits. BOWEL: The appendix is dilated to 2.0 cm in diameter with wall thickening and periappendiceal fat stranding and free fluid. The largest pocket of free fluid measures up to approximately 2.7 cm, though no discrete rim enhancement to suggest abscess. No evidence of free air. There is wall thickening and inflammatory changes involving the adjacent duodenum, jejunum, ileum, and cecum which is most likely reactive. No evidence of bowel obstruction. Colonic diverticulosis without evidence of diverticulitis. VASCULAR: The abdominal aorta and iliac arterial system are nonaneurysmal with mild atherosclerotic calcifications. NODES: No lymphadenopathy. FLUID: No free intraperitoneal fluid. REPRODUCTIVE: Unremarkable  BONES/SOFT TISSUES: No acute or aggressive osseous abnormality. Regional soft tissues are unremarkable. IMPRESSION:  1. Acute appendicitis with severe surrounding periappendiceal inflammatory changes including free fluid but no discrete organized abscess or free air to  confirm perforation. 2. Inflammatory changes involving the adjacent small bowel and cecum which is most likely reactive. 3. New 1.4 cm hypoattenuating lesion in the liver is indeterminate. A small developing hepatic abscess is a consideration. Consider short-term follow-up CT to reassess.   4. Colonic diverticulosis.          =>11/17/2023 Chest dextrose 5 % and 0.45 % NaCl with KCl 20 mEq infusion   IntraVENous Continuous    morphine (PF) injection 2 mg  2 mg IntraVENous Q2H PRN    morphine sulfate (PF) injection 4 mg  4 mg IntraVENous Q2H PRN    morphine (PF) injection 6 mg  6 mg IntraVENous Q2H PRN    oxyCODONE (ROXICODONE) immediate release tablet 10 mg  10 mg Oral Q4H PRN    oxyCODONE (ROXICODONE) immediate release tablet 5 mg  5 mg Oral Q4H PRN    famotidine (PEPCID) 20 mg in sodium chloride (PF) 0.9 % 10 mL injection  20 mg IntraVENous Q12H    acetaminophen (TYLENOL) tablet 1,000 mg  1,000 mg Oral Q6H PRN    ondansetron (ZOFRAN) injection 4 mg  4 mg IntraVENous Q6H PRN     ALLERGIES:  Patient has no known allergies.    Social History     Socioeconomic History    Marital status:      Spouse name: None    Number of children: None    Years of education: None    Highest education level: None   Tobacco Use    Smoking status: Former     Packs/day: 0.25     Years: 27.00     Additional pack years: 0.00     Total pack years: 6.75     Types: Cigarettes     Start date: 1970     Quit date: 1997     Years since quittin.8    Smokeless tobacco: Never   Vaping Use    Vaping Use: Never used   Substance and Sexual Activity    Alcohol use: Yes     Alcohol/week: 3.0 standard drinks of alcohol     Types: 3 Glasses of wine per week     Comment: occasional    Drug use: Never    Sexual activity: Not Currently     Social Determinants of Health     Financial Resource Strain: Low Risk  (3/6/2023)    Overall Financial Resource Strain (CARDIA)     Difficulty of Paying Living Expenses: Not very hard   Food Insecurity: No Food Insecurity (3/6/2023)    Hunger Vital Sign     Worried About Running Out of Food in the Last Year: Never true     Ran Out of Food in the Last Year: Never true   Transportation Needs: Unknown (3/6/2023)    PRAPARE - Transportation     Lack of Transportation (Non-Medical): No   Physical Activity: Sufficiently Active (10/19/2023)    Exercise

## 2023-11-17 NOTE — PROGRESS NOTES
Patient noted to have had a burst of tachycardia (140s-150s) with some sinus beats and much ectopy (PACs & PVCs). Dr. Evert Dejesus noted and will continue to monitor. ECHO pending. EKG already completed.

## 2023-11-17 NOTE — ICUWATCH
Will see patient at request of Tanvi Valentino and Dr. Garcia Martinez. Per the report received from Freddy Loja, there have been no significant changes since 0600 when Lizzie Marcus RRT RN saw patient.

## 2023-11-17 NOTE — CONSULTS
History and Physical Initial Visit NOTE           11/17/2023    Debora Dow                        Date of Admission:  11/15/2023    The patient's chart is reviewed and the patient is discussed with the staff. Subjective:     Patient is a 70 y.o.  female seen and evaluated at the request of Dr. Ramón Bocanegra and when the [facility], [address] [place] [address]. This patient was admitted to the hospital 2 days ago complaining with progressive lower abdominal pain for the last 2 months which was much more severe. Patient apparently she was a status post laparoscopic right inguinal hernia repair with 3D mesh placed on 7/21/2023 by Dr. Kwame Vitale. Patient apparently had developed bilateral pulmonary emboli postoperatively on August 7, 2023 and this being treated with Xarelto which was given at the last dose on the day before admission. The patient underwent laparoscopic converted to open ileocecectomy with ileocolostomy since she was found to have a cecal mass. She had an open drainage for localized peritonitis. 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 Patient has been treated with IV Zosyn and IV fluid at 100 cc an hour. The patient noted increasing shortness of breath and wheezing over the last 24 hours and her oxygen demand increased and this morning she was on 6 L nasal cannula. We will consult to assist in her management. Patient was transported to CT this morning and had CT with PE protocol which was reviewed and no evidence of any pulmonary emboli. Her chest x-ray suggested acute pulmonary edema and this confirmed with a CT which revealed atelectasis and interstitial infiltrate and small effusion bilaterally. She does have some nausea and headache this morning but no definite fever or chills. She does have abdominal pain at the site of surgery only. Review of Systems  Pertinent items are noted in HPI.     Prior to

## 2023-11-17 NOTE — PROGRESS NOTES
Pt was assessed and noted to sound wet on auscultation as well as some wheezing. Norma outreach RN called to bedside. Norma agreed with this nurses assessment and NP was contacted. Order for CXR obtained. Awaiting BNP add on results. Primary fluids stopped at this time. RT administering breathing treatment for wheezing, pt placed on continuous pulse ox monitor.

## 2023-11-17 NOTE — ICUWATCH
Arrived to see patient at [facility]. Spoke with patients primary RN Lara Garcia, and phoned Dr. Albina Galan to let him know of the need for a stat pulm consult per Dr. Leeann Galindo. Dr. Negar Pack to see patient. Per Dr. Leeann Galindo start heparin gtt r/t recent pulmonary embolus. Lab at bedside to draw baseline PTT. Spoke with Desmond in pharmacy r/t administration of xarelto last evening, and its effect on heparin administration. At ~0800 patient was brought to CT scan by this RN. Dr Negar Pack met us in the CT suite. From there we proceeded to the patients room where she was seen by Dr. Negar Pack. Her heparin gtt was started, patient received a dose of lasix, antiemetics, morphine for her headache, and pepcid. Patient remains on 6L NC at this time. During my assessment I found no significant changes to the 0600 assessment. Crackles remain in patients lung bases. Heart rate regular with occasional ectopic beat. Bowel sounds signiificantly diminished in 3/4 quadrants. Bulb drain remains in place on the right side with serosanguinous drainage.  Patient to be transferred to ICU at [facility] request.

## 2023-11-17 NOTE — PROGRESS NOTES
Physical Therapy Note:    Attempted to see patient this AM for physical therapy treatment  session. Patient is being transferred to the ICU. Nurse wants us to hold pt. Will follow and re-attempt as schedule permits/patient available.  Thank you,    Adeline Bettencourt, DYLAN     Rehab Caseload Tracker

## 2023-11-18 LAB
ALBUMIN SERPL-MCNC: 2.2 G/DL (ref 3.2–4.6)
ALBUMIN/GLOB SERPL: 0.6 (ref 0.4–1.6)
ALP SERPL-CCNC: 144 U/L (ref 50–136)
ALT SERPL-CCNC: 18 U/L (ref 12–65)
ANION GAP SERPL CALC-SCNC: 7 MMOL/L (ref 2–11)
AST SERPL-CCNC: 19 U/L (ref 15–37)
BILIRUB SERPL-MCNC: 0.3 MG/DL (ref 0.2–1.1)
BUN SERPL-MCNC: 9 MG/DL (ref 8–23)
CALCIUM SERPL-MCNC: 8 MG/DL (ref 8.3–10.4)
CHLORIDE SERPL-SCNC: 108 MMOL/L (ref 101–110)
CHOLEST SERPL-MCNC: 135 MG/DL
CO2 SERPL-SCNC: 27 MMOL/L (ref 21–32)
CREAT SERPL-MCNC: 0.69 MG/DL (ref 0.6–1)
ERYTHROCYTE [DISTWIDTH] IN BLOOD BY AUTOMATED COUNT: 13.3 % (ref 11.9–14.6)
EST. AVERAGE GLUCOSE BLD GHB EST-MCNC: ABNORMAL MG/DL
GLOBULIN SER CALC-MCNC: 3.5 G/DL (ref 2.8–4.5)
GLUCOSE SERPL-MCNC: 115 MG/DL (ref 65–100)
HBA1C MFR BLD: <3.8 % (ref 4.8–5.6)
HCT VFR BLD AUTO: 25.1 % (ref 35.8–46.3)
HCT VFR BLD AUTO: 27.7 % (ref 35.8–46.3)
HDLC SERPL-MCNC: 39 MG/DL (ref 40–60)
HDLC SERPL: 3.5
HGB BLD-MCNC: 7.5 G/DL (ref 11.7–15.4)
HGB BLD-MCNC: 8.4 G/DL (ref 11.7–15.4)
LDLC SERPL CALC-MCNC: 73.4 MG/DL
MAGNESIUM SERPL-MCNC: 2.2 MG/DL (ref 1.8–2.4)
MCH RBC QN AUTO: 27.9 PG (ref 26.1–32.9)
MCHC RBC AUTO-ENTMCNC: 29.9 G/DL (ref 31.4–35)
MCV RBC AUTO: 93.3 FL (ref 82–102)
MM INDURATION, POC: 0 MM (ref 0–5)
MM INDURATION, POC: 0 MM (ref 0–5)
NRBC # BLD: 0.02 K/UL (ref 0–0.2)
PHOSPHATE SERPL-MCNC: 2.3 MG/DL (ref 2.3–3.7)
PLATELET # BLD AUTO: 327 K/UL (ref 150–450)
PMV BLD AUTO: 10.6 FL (ref 9.4–12.3)
POTASSIUM SERPL-SCNC: 3.3 MMOL/L (ref 3.5–5.1)
PPD, POC: NEGATIVE
PPD, POC: NEGATIVE
PROCALCITONIN SERPL-MCNC: 0.38 NG/ML (ref 0–0.49)
PROT SERPL-MCNC: 5.7 G/DL (ref 6.3–8.2)
RBC # BLD AUTO: 2.69 M/UL (ref 4.05–5.2)
SODIUM SERPL-SCNC: 142 MMOL/L (ref 133–143)
T4 FREE SERPL-MCNC: 1.3 NG/DL (ref 0.78–1.46)
TRIGL SERPL-MCNC: 113 MG/DL (ref 35–150)
TSH W FREE THYROID IF ABNORMAL: 6.94 UIU/ML (ref 0.36–3.74)
VLDLC SERPL CALC-MCNC: 22.6 MG/DL (ref 6–23)
WBC # BLD AUTO: 8.9 K/UL (ref 4.3–11.1)

## 2023-11-18 PROCEDURE — 2000000000 HC ICU R&B

## 2023-11-18 PROCEDURE — 36415 COLL VENOUS BLD VENIPUNCTURE: CPT

## 2023-11-18 PROCEDURE — 2580000003 HC RX 258: Performed by: SURGERY

## 2023-11-18 PROCEDURE — A4216 STERILE WATER/SALINE, 10 ML: HCPCS | Performed by: SURGERY

## 2023-11-18 PROCEDURE — 85027 COMPLETE CBC AUTOMATED: CPT

## 2023-11-18 PROCEDURE — 2500000003 HC RX 250 WO HCPCS: Performed by: INTERNAL MEDICINE

## 2023-11-18 PROCEDURE — 2500000003 HC RX 250 WO HCPCS: Performed by: FAMILY MEDICINE

## 2023-11-18 PROCEDURE — 85014 HEMATOCRIT: CPT

## 2023-11-18 PROCEDURE — 84145 PROCALCITONIN (PCT): CPT

## 2023-11-18 PROCEDURE — 6370000000 HC RX 637 (ALT 250 FOR IP): Performed by: FAMILY MEDICINE

## 2023-11-18 PROCEDURE — 84439 ASSAY OF FREE THYROXINE: CPT

## 2023-11-18 PROCEDURE — 2500000003 HC RX 250 WO HCPCS: Performed by: SURGERY

## 2023-11-18 PROCEDURE — 94761 N-INVAS EAR/PLS OXIMETRY MLT: CPT

## 2023-11-18 PROCEDURE — 85018 HEMOGLOBIN: CPT

## 2023-11-18 PROCEDURE — 6360000002 HC RX W HCPCS: Performed by: FAMILY MEDICINE

## 2023-11-18 PROCEDURE — 83036 HEMOGLOBIN GLYCOSYLATED A1C: CPT

## 2023-11-18 PROCEDURE — 84443 ASSAY THYROID STIM HORMONE: CPT

## 2023-11-18 PROCEDURE — 2580000003 HC RX 258: Performed by: FAMILY MEDICINE

## 2023-11-18 PROCEDURE — 84100 ASSAY OF PHOSPHORUS: CPT

## 2023-11-18 PROCEDURE — 80053 COMPREHEN METABOLIC PANEL: CPT

## 2023-11-18 PROCEDURE — 80061 LIPID PANEL: CPT

## 2023-11-18 PROCEDURE — 6360000002 HC RX W HCPCS: Performed by: SURGERY

## 2023-11-18 PROCEDURE — 97530 THERAPEUTIC ACTIVITIES: CPT

## 2023-11-18 PROCEDURE — 6370000000 HC RX 637 (ALT 250 FOR IP): Performed by: NURSE PRACTITIONER

## 2023-11-18 PROCEDURE — 2700000000 HC OXYGEN THERAPY PER DAY

## 2023-11-18 PROCEDURE — 83735 ASSAY OF MAGNESIUM: CPT

## 2023-11-18 RX ORDER — POTASSIUM CHLORIDE 7.45 MG/ML
10 INJECTION INTRAVENOUS PRN
Status: DISCONTINUED | OUTPATIENT
Start: 2023-11-18 | End: 2023-11-21 | Stop reason: HOSPADM

## 2023-11-18 RX ORDER — BUMETANIDE 1 MG/1
1 TABLET ORAL 2 TIMES DAILY
Status: DISCONTINUED | OUTPATIENT
Start: 2023-11-18 | End: 2023-11-19

## 2023-11-18 RX ORDER — POTASSIUM CHLORIDE 20 MEQ/1
40 TABLET, EXTENDED RELEASE ORAL PRN
Status: DISCONTINUED | OUTPATIENT
Start: 2023-11-18 | End: 2023-11-21 | Stop reason: HOSPADM

## 2023-11-18 RX ORDER — BUMETANIDE 1 MG/1
1 TABLET ORAL DAILY
Status: DISCONTINUED | OUTPATIENT
Start: 2023-11-19 | End: 2023-11-18

## 2023-11-18 RX ORDER — FLUTICASONE PROPIONATE 50 MCG
1 SPRAY, SUSPENSION (ML) NASAL DAILY PRN
Status: DISCONTINUED | OUTPATIENT
Start: 2023-11-18 | End: 2023-11-18

## 2023-11-18 RX ADMIN — OXYCODONE HYDROCHLORIDE 10 MG: 5 SOLUTION ORAL at 13:26

## 2023-11-18 RX ADMIN — FAMOTIDINE 20 MG: 10 INJECTION INTRAVENOUS at 20:38

## 2023-11-18 RX ADMIN — POTASSIUM CHLORIDE, DEXTROSE MONOHYDRATE AND SODIUM CHLORIDE: 150; 5; 450 INJECTION, SOLUTION INTRAVENOUS at 13:34

## 2023-11-18 RX ADMIN — HYDROMORPHONE HYDROCHLORIDE 1 MG: 1 INJECTION, SOLUTION INTRAMUSCULAR; INTRAVENOUS; SUBCUTANEOUS at 20:46

## 2023-11-18 RX ADMIN — FAMOTIDINE 20 MG: 10 INJECTION INTRAVENOUS at 08:14

## 2023-11-18 RX ADMIN — ACETAMINOPHEN 1000 MG: 500 TABLET, FILM COATED ORAL at 12:14

## 2023-11-18 RX ADMIN — LEVOTHYROXINE SODIUM 50 MCG: 0.05 TABLET ORAL at 06:10

## 2023-11-18 RX ADMIN — ACETAMINOPHEN 1000 MG: 500 TABLET, FILM COATED ORAL at 00:26

## 2023-11-18 RX ADMIN — HYDROMORPHONE HYDROCHLORIDE 1 MG: 1 INJECTION, SOLUTION INTRAMUSCULAR; INTRAVENOUS; SUBCUTANEOUS at 04:07

## 2023-11-18 RX ADMIN — ONDANSETRON 4 MG: 2 INJECTION INTRAMUSCULAR; INTRAVENOUS at 12:14

## 2023-11-18 RX ADMIN — PIPERACILLIN AND TAZOBACTAM 3375 MG: 3; .375 INJECTION, POWDER, LYOPHILIZED, FOR SOLUTION INTRAVENOUS at 20:39

## 2023-11-18 RX ADMIN — HYDROMORPHONE HYDROCHLORIDE 1 MG: 1 INJECTION, SOLUTION INTRAMUSCULAR; INTRAVENOUS; SUBCUTANEOUS at 08:14

## 2023-11-18 RX ADMIN — HYDROMORPHONE HYDROCHLORIDE 1 MG: 1 INJECTION, SOLUTION INTRAMUSCULAR; INTRAVENOUS; SUBCUTANEOUS at 11:58

## 2023-11-18 RX ADMIN — ACETAMINOPHEN 1000 MG: 500 TABLET, FILM COATED ORAL at 06:10

## 2023-11-18 RX ADMIN — ACETAMINOPHEN 1000 MG: 500 TABLET, FILM COATED ORAL at 18:28

## 2023-11-18 RX ADMIN — SODIUM CHLORIDE 125 MG: 9 INJECTION, SOLUTION INTRAVENOUS at 16:30

## 2023-11-18 RX ADMIN — ONDANSETRON 4 MG: 2 INJECTION INTRAMUSCULAR; INTRAVENOUS at 06:22

## 2023-11-18 RX ADMIN — NALOXEGOL OXALATE 12.5 MG: 25 TABLET, FILM COATED ORAL at 06:10

## 2023-11-18 RX ADMIN — PIPERACILLIN AND TAZOBACTAM 3375 MG: 3; .375 INJECTION, POWDER, LYOPHILIZED, FOR SOLUTION INTRAVENOUS at 04:11

## 2023-11-18 RX ADMIN — BUMETANIDE 1 MG: 1 TABLET ORAL at 20:38

## 2023-11-18 RX ADMIN — POTASSIUM CHLORIDE 40 MEQ: 1500 TABLET, EXTENDED RELEASE ORAL at 06:10

## 2023-11-18 RX ADMIN — PIPERACILLIN AND TAZOBACTAM 3375 MG: 3; .375 INJECTION, POWDER, LYOPHILIZED, FOR SOLUTION INTRAVENOUS at 13:30

## 2023-11-18 RX ADMIN — BUMETANIDE 1 MG: 0.25 INJECTION INTRAMUSCULAR; INTRAVENOUS at 08:14

## 2023-11-18 ASSESSMENT — PAIN SCALES - GENERAL
PAINLEVEL_OUTOF10: 8
PAINLEVEL_OUTOF10: 0
PAINLEVEL_OUTOF10: 0
PAINLEVEL_OUTOF10: 5
PAINLEVEL_OUTOF10: 7
PAINLEVEL_OUTOF10: 0
PAINLEVEL_OUTOF10: 8
PAINLEVEL_OUTOF10: 7

## 2023-11-18 ASSESSMENT — PAIN DESCRIPTION - DESCRIPTORS
DESCRIPTORS: ACHING
DESCRIPTORS: ACHING;SORE
DESCRIPTORS: ACHING

## 2023-11-18 ASSESSMENT — PAIN DESCRIPTION - LOCATION
LOCATION: ABDOMEN
LOCATION: ABDOMEN;INCISION
LOCATION: ABDOMEN;INCISION

## 2023-11-18 ASSESSMENT — PAIN DESCRIPTION - ORIENTATION
ORIENTATION: ANTERIOR;RIGHT
ORIENTATION: RIGHT;ANTERIOR

## 2023-11-18 NOTE — PROGRESS NOTES
Hospitalist Progress Note   Admit Date:  11/15/2023 11:38 AM   Name:  Ardell Duane   Age:  70 y.o. Sex:  female  :  1945   MRN:  [id number]   Room:  [facility]/    Presenting/Chief Complaint: Abdominal Pain, Emesis, and Diarrhea     Reason(s) for Admission: Acute appendicitis [K35.80]  Acute appendicitis with generalized peritonitis, unspecified whether abscess present, unspecified whether gangrene present, unspecified whether perforation present [K35.209]  Cecum mass [facility]     Hospital Course:   70 y.o. 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 Underwent surgical intervention 11/15. Developed postoperative hypoxia. Was transition to intensive care. Hospitalist consulted for medical management. We will take over primary attending role. Subjective & 24hr Events:   O2 requirement decrease, remains on 3L while sleeping. Surgery discontinued naloxegol. Remains NPO on D5. Hgb 7.5, hold OAC. Plan discussed with nurse and . Assessment & Plan:   Cecum mass  Underwent surgical intervention 11/15  -Follow-up pathology     Pulmonary embolism, bilateral  -hold Apixaban    Anemia   -transfuse Hgb < 7 or <8 with cardiac symptoms    Leukocytosis  -Follow cultures  -Pip-tazo     Lower abdominal pain  -Dilaudid     Acute hypoxemic respiratory failure  Secondary to volume overload  -bumetanide changed to po bid      PT/OT evals and PPD needed/ordered? Yes  Diet:  Diet NPO Exceptions are: Sips of Water with Meds  VTE prophylaxis: SCD's   Code status: Full Code      Non-peripheral Lines and Tubes (if present):      Closed/Suction Drain Midline Abdomen Bulb (Active)       Urinary Catheter 11/15/23 2 Way (Active)        Telemetry (if present):  Cardiac/Telemetry Monitor On:  Bedside monitor in use        Hospital Problems:  Principal Problem:    Malignant neoplasm of cecum   Result Value Ref Range    T4 Free 1.3 0.78 - 1.46 NG/DL   Magnesium    Collection Time: 11/18/23  4:04 AM   Result Value Ref Range    Magnesium 2.2 1.8 - 2.4 mg/dL       Current Meds:  Current Facility-Administered Medications   Medication Dose Route Frequency    potassium chloride (KLOR-CON M) extended release tablet 40 mEq  40 mEq Oral PRN    Or    potassium bicarb-citric acid (EFFER-K) effervescent tablet 40 mEq  40 mEq Oral PRN    Or    potassium chloride 10 mEq/100 mL IVPB (Peripheral Line)  10 mEq IntraVENous PRN    piperacillin-tazobactam (ZOSYN) 3,375 mg in sodium chloride 0.9 % 50 mL IVPB (mini-bag)  3,375 mg IntraVENous Q8H    apixaban (ELIQUIS) tablet 5 mg  5 mg Oral BID    bumetanide (BUMEX) injection 1 mg  1 mg IntraVENous BID    acetaminophen (TYLENOL) tablet 1,000 mg  1,000 mg Oral 4 times per day    naloxegol (MOVANTIK) tablet 12.5 mg  12.5 mg Oral QAM AC    HYDROmorphone HCl PF (DILAUDID) injection 0.5 mg  0.5 mg IntraVENous Q3H PRN    Or    HYDROmorphone HCl PF (DILAUDID) injection 1 mg  1 mg IntraVENous Q3H PRN    oxyCODONE (ROXICODONE) 5 MG/5ML solution 10 mg  10 mg Oral Q3H PRN    tuberculin injection 5 Units  5 Units IntraDERmal Once    levothyroxine (SYNTHROID) tablet 50 mcg  50 mcg Oral Daily    dextrose 5 % and 0.45 % NaCl with KCl 20 mEq infusion   IntraVENous Continuous    famotidine (PEPCID) 20 mg in sodium chloride (PF) 0.9 % 10 mL injection  20 mg IntraVENous Q12H    ondansetron (ZOFRAN) injection 4 mg  4 mg IntraVENous Q6H PRN       Signed:  Del Amador MD

## 2023-11-18 NOTE — CONSENT
Informed Consent for Blood Component Transfusion Note    I have discussed with the patient the rationale for blood component transfusion; its benefits in treating or preventing fatigue, organ damage, or death; and its risk which includes mild transfusion reactions, rare risk of blood borne infection, or more serious but rare reactions. I have discussed the alternatives to transfusion, including the risk and consequences of not receiving transfusion. The patient had an opportunity to ask questions and had agreed to proceed with transfusion of blood components.     Electronically signed by Cathryne Bosworth, MD on 11/18/23 at 12:27 PM EST

## 2023-11-18 NOTE — PLAN OF CARE
Problem: Pain  Goal: Verbalizes/displays adequate comfort level or baseline comfort level  Outcome: Progressing  Flowsheets  Taken 11/17/2023 2000 by Zeb Lopez RN  Verbalizes/displays adequate comfort level or baseline comfort level:   Encourage patient to monitor pain and request assistance   Assess pain using appropriate pain scale   Administer analgesics based on type and severity of pain and evaluate response  Taken 11/17/2023 1553 by Torin Hickman RN  Verbalizes/displays adequate comfort level or baseline comfort level:   Encourage patient to monitor pain and request assistance   Assess pain using appropriate pain scale  Taken 11/17/2023 0933 by Torin Hickman RN  Verbalizes/displays adequate comfort level or baseline comfort level:   Encourage patient to monitor pain and request assistance   Assess pain using appropriate pain scale     Problem: Discharge Planning  Goal: Discharge to home or other facility with appropriate resources  Outcome: Progressing  Flowsheets  Taken 11/17/2023 2015 by Zeb Lopez RN  Discharge to home or other facility with appropriate resources: Identify barriers to discharge with patient and caregiver  Taken 11/17/2023 0933 by Torin Hickman RN  Discharge to home or other facility with appropriate resources:   Identify barriers to discharge with patient and caregiver   Arrange for needed discharge resources and transportation as appropriate   Identify discharge learning needs (meds, wound care, etc)   Refer to discharge planning if patient needs post-hospital services based on physician order or complex needs related to functional status, cognitive ability or social support system     Problem: Safety - Adult  Goal: Free from fall injury  Outcome: Progressing

## 2023-11-18 NOTE — PROGRESS NOTES
Retired    OBJECTIVE:     PAIN: VITALS / O2: PRECAUTION / LINES / DRAINS:   Pre Treatment:    Pt reports right sided abdominal pain from surgery, pain not rated, nsg aware and has given pain medication.      Post Treatment: Pt no c/o increased pain post Vitals        Oxygen      Continuous Pulse Oximetry, Lazcano Catheter, IV, BREEZY Drain, and Telemetry     RESTRICTIONS/PRECAUTIONS:  Restrictions/Precautions: Surgical Protocols, Fall Risk                 GROSS EVALUATION:  Intact Impaired (Comments):   AROM [x]     PROM []    Strength []  Grossly 3+-4/5B LEs   Balance []  Decreased standing   Posture [] Forward Head  Rounded Shoulders  Trunk flexion   Sensation [x]     Coordination [x]      Tone [x]     Edema []    Activity Tolerance []  Decreased-nausea and vomitting    []      COGNITION/  PERCEPTION: Intact Impaired (Comments):   Orientation [x]     Vision [x]     Hearing [x]     Cognition  [x]       MOBILITY: I Mod I S SBA CGA Min Mod Max Total  NT x2 Comments:   Bed Mobility    Rolling [] [] [] [] [] [x] [] [] [] [] []    Supine to Sit [] [] [] [] [] [x] [] [] [] [] []    Scooting [] [] [] [] [] [x] [] [] [] [] []    Sit to Supine [] [] [] [] [] [] [] [] [] [x] []    Transfers    Sit to Stand [] [] [] [] [x] [] [] [] [] [] []    Bed to Chair [] [] [] [] [x] [] [] [] [] [] []    Stand to Sit [] [] [] [] [x] [] [] [] [] [] []     [] [] [] [] [] [] [] [] [] [] []    I=Independent, Mod I=Modified Independent, S=Supervision, SBA=Standby Assistance, CGA=Contact Guard Assistance,   Min=Minimal Assistance, Mod=Moderate Assistance, Max=Maximal Assistance, Total=Total Assistance, NT=Not Tested    GAIT: I Mod I S SBA CGA Min Mod Max Total  NT x2 Comments:   Level of Assistance [] [] [] [] [x] [] [] [] [] [] []    Distance Few steps bed to chair      DME Hand held assist    Gait Quality Decreased aura , Decreased step clearance, Decreased step length, and Narrow base of support    Weightbearing Status      Stairs     I=Independent, Mod I=Modified Independent, S=Supervision, SBA=Standby Assistance, CGA=Contact Guard Assistance,   Min=Minimal Assistance, Mod=Moderate Assistance, Max=Maximal Assistance, Total=Total Assistance, NT=Not Tested    PLAN:   FREQUENCY AND DURATION: Daily for duration of hospital stay or until stated goals are met, whichever comes first.    THERAPY PROGNOSIS: Good    PROBLEM LIST:   (Skilled intervention is medically necessary to address:)  Decreased ADL/Functional Activities  Decreased Activity Tolerance  Decreased Balance  Decreased Gait Ability  Decreased Strength  Decreased Transfer Abilities  Increased Pain INTERVENTIONS PLANNED:   (Benefits and precautions of physical therapy have been discussed with the patient.)  Self Care Training  Therapeutic Activity  Therapeutic Exercise/HEP  Gait Training  Education       TREATMENT:   TREATMENT:   Therapeutic Activity (23 Minutes): Therapeutic activity included Rolling, Supine to Sit, Scooting, Transfer Training, Ambulation on level ground, Sitting balance , Standing balance, and pt instructed to perform ankle pumps, heel slides and hip abd/add sitting and supine, pt was able to demonstrate about 5 reps B to improve functional Activity tolerance, Balance, Mobility, and Strength.     TREATMENT GRID:  N/A    AFTER TREATMENT PRECAUTIONS: Bed/Chair Locked, Call light within reach, Chair, Needs within reach, and RN at bedside    INTERDISCIPLINARY COLLABORATION:  RN/ PCT    EDUCATION:      TIME IN/OUT:  Time In: 1200  Time Out: [address]  Minutes: [address] [place], PT

## 2023-11-19 ENCOUNTER — APPOINTMENT (OUTPATIENT)
Dept: GENERAL RADIOLOGY | Age: 78
DRG: 329 | End: 2023-11-19
Payer: MEDICARE

## 2023-11-19 LAB
ALBUMIN SERPL-MCNC: 2.2 G/DL (ref 3.2–4.6)
ALBUMIN/GLOB SERPL: 0.6 (ref 0.4–1.6)
ALP SERPL-CCNC: 192 U/L (ref 50–136)
ALT SERPL-CCNC: 20 U/L (ref 12–65)
ANION GAP SERPL CALC-SCNC: 7 MMOL/L (ref 2–11)
AST SERPL-CCNC: 20 U/L (ref 15–37)
BASOPHILS # BLD: 0.1 K/UL (ref 0–0.2)
BASOPHILS NFR BLD: 1 % (ref 0–2)
BILIRUB SERPL-MCNC: 0.4 MG/DL (ref 0.2–1.1)
BUN SERPL-MCNC: 6 MG/DL (ref 8–23)
CALCIUM SERPL-MCNC: 8.5 MG/DL (ref 8.3–10.4)
CHLORIDE SERPL-SCNC: 103 MMOL/L (ref 101–110)
CO2 SERPL-SCNC: 30 MMOL/L (ref 21–32)
CREAT SERPL-MCNC: 0.57 MG/DL (ref 0.6–1)
DIFFERENTIAL METHOD BLD: ABNORMAL
EOSINOPHIL # BLD: 0.4 K/UL (ref 0–0.8)
EOSINOPHIL NFR BLD: 4 % (ref 0.5–7.8)
ERYTHROCYTE [DISTWIDTH] IN BLOOD BY AUTOMATED COUNT: 13.4 % (ref 11.9–14.6)
GLOBULIN SER CALC-MCNC: 3.9 G/DL (ref 2.8–4.5)
GLUCOSE SERPL-MCNC: 109 MG/DL (ref 65–100)
HCT VFR BLD AUTO: 26.7 % (ref 35.8–46.3)
HGB BLD-MCNC: 8.2 G/DL (ref 11.7–15.4)
IMM GRANULOCYTES # BLD AUTO: 0.1 K/UL (ref 0–0.5)
IMM GRANULOCYTES NFR BLD AUTO: 1 % (ref 0–5)
LYMPHOCYTES # BLD: 1.2 K/UL (ref 0.5–4.6)
LYMPHOCYTES NFR BLD: 13 % (ref 13–44)
MAGNESIUM SERPL-MCNC: 2.1 MG/DL (ref 1.8–2.4)
MCH RBC QN AUTO: 28.2 PG (ref 26.1–32.9)
MCHC RBC AUTO-ENTMCNC: 30.7 G/DL (ref 31.4–35)
MCV RBC AUTO: 91.8 FL (ref 82–102)
MONOCYTES # BLD: 0.8 K/UL (ref 0.1–1.3)
MONOCYTES NFR BLD: 9 % (ref 4–12)
NEUTS SEG # BLD: 6.3 K/UL (ref 1.7–8.2)
NEUTS SEG NFR BLD: 72 % (ref 43–78)
NRBC # BLD: 0.02 K/UL (ref 0–0.2)
PHOSPHATE SERPL-MCNC: 1.9 MG/DL (ref 2.3–3.7)
PLATELET # BLD AUTO: 410 K/UL (ref 150–450)
PMV BLD AUTO: 10.2 FL (ref 9.4–12.3)
POTASSIUM SERPL-SCNC: 3.2 MMOL/L (ref 3.5–5.1)
PROCALCITONIN SERPL-MCNC: 0.81 NG/ML (ref 0–0.49)
PROT SERPL-MCNC: 6.1 G/DL (ref 6.3–8.2)
RBC # BLD AUTO: 2.91 M/UL (ref 4.05–5.2)
SODIUM SERPL-SCNC: 140 MMOL/L (ref 133–143)
WBC # BLD AUTO: 8.8 K/UL (ref 4.3–11.1)

## 2023-11-19 PROCEDURE — 2580000003 HC RX 258: Performed by: FAMILY MEDICINE

## 2023-11-19 PROCEDURE — 6360000002 HC RX W HCPCS: Performed by: FAMILY MEDICINE

## 2023-11-19 PROCEDURE — 97535 SELF CARE MNGMENT TRAINING: CPT

## 2023-11-19 PROCEDURE — 6370000000 HC RX 637 (ALT 250 FOR IP): Performed by: NURSE PRACTITIONER

## 2023-11-19 PROCEDURE — 2500000003 HC RX 250 WO HCPCS: Performed by: SURGERY

## 2023-11-19 PROCEDURE — 2500000003 HC RX 250 WO HCPCS: Performed by: FAMILY MEDICINE

## 2023-11-19 PROCEDURE — 80053 COMPREHEN METABOLIC PANEL: CPT

## 2023-11-19 PROCEDURE — A4216 STERILE WATER/SALINE, 10 ML: HCPCS | Performed by: SURGERY

## 2023-11-19 PROCEDURE — 97530 THERAPEUTIC ACTIVITIES: CPT

## 2023-11-19 PROCEDURE — 2580000003 HC RX 258: Performed by: SURGERY

## 2023-11-19 PROCEDURE — 84100 ASSAY OF PHOSPHORUS: CPT

## 2023-11-19 PROCEDURE — 2500000003 HC RX 250 WO HCPCS: Performed by: INTERNAL MEDICINE

## 2023-11-19 PROCEDURE — 94761 N-INVAS EAR/PLS OXIMETRY MLT: CPT

## 2023-11-19 PROCEDURE — 2000000000 HC ICU R&B

## 2023-11-19 PROCEDURE — 84145 PROCALCITONIN (PCT): CPT

## 2023-11-19 PROCEDURE — 2700000000 HC OXYGEN THERAPY PER DAY

## 2023-11-19 PROCEDURE — 6370000000 HC RX 637 (ALT 250 FOR IP): Performed by: FAMILY MEDICINE

## 2023-11-19 PROCEDURE — 36415 COLL VENOUS BLD VENIPUNCTURE: CPT

## 2023-11-19 PROCEDURE — 85025 COMPLETE CBC W/AUTO DIFF WBC: CPT

## 2023-11-19 PROCEDURE — 71046 X-RAY EXAM CHEST 2 VIEWS: CPT

## 2023-11-19 PROCEDURE — 83735 ASSAY OF MAGNESIUM: CPT

## 2023-11-19 RX ORDER — FERROUS SULFATE 325(65) MG
325 TABLET ORAL 2 TIMES DAILY WITH MEALS
Status: DISCONTINUED | OUTPATIENT
Start: 2023-11-19 | End: 2023-11-20

## 2023-11-19 RX ORDER — DEXTROSE MONOHYDRATE, SODIUM CHLORIDE, AND POTASSIUM CHLORIDE 50; 2.98; 4.5 G/1000ML; G/1000ML; G/1000ML
INJECTION, SOLUTION INTRAVENOUS CONTINUOUS
Status: DISCONTINUED | OUTPATIENT
Start: 2023-11-19 | End: 2023-11-21

## 2023-11-19 RX ORDER — BUMETANIDE 0.25 MG/ML
2 INJECTION INTRAMUSCULAR; INTRAVENOUS ONCE
Status: COMPLETED | OUTPATIENT
Start: 2023-11-19 | End: 2023-11-19

## 2023-11-19 RX ORDER — BUMETANIDE 1 MG/1
1 TABLET ORAL DAILY
Status: DISCONTINUED | OUTPATIENT
Start: 2023-11-20 | End: 2023-11-21

## 2023-11-19 RX ADMIN — PIPERACILLIN AND TAZOBACTAM 3375 MG: 3; .375 INJECTION, POWDER, LYOPHILIZED, FOR SOLUTION INTRAVENOUS at 05:19

## 2023-11-19 RX ADMIN — BUMETANIDE 2 MG: 0.25 INJECTION INTRAMUSCULAR; INTRAVENOUS at 18:15

## 2023-11-19 RX ADMIN — BUMETANIDE 1 MG: 1 TABLET ORAL at 09:39

## 2023-11-19 RX ADMIN — PIPERACILLIN AND TAZOBACTAM 3375 MG: 3; .375 INJECTION, POWDER, LYOPHILIZED, FOR SOLUTION INTRAVENOUS at 13:51

## 2023-11-19 RX ADMIN — ACETAMINOPHEN 1000 MG: 500 TABLET, FILM COATED ORAL at 00:36

## 2023-11-19 RX ADMIN — APIXABAN 5 MG: 5 TABLET, FILM COATED ORAL at 20:56

## 2023-11-19 RX ADMIN — FERROUS SULFATE TAB 325 MG (65 MG ELEMENTAL FE) 325 MG: 325 (65 FE) TAB at 18:18

## 2023-11-19 RX ADMIN — SODIUM PHOSPHATE, MONOBASIC, MONOHYDRATE AND SODIUM PHOSPHATE, DIBASIC, ANHYDROUS 15 MMOL: 142; 276 INJECTION, SOLUTION INTRAVENOUS at 09:46

## 2023-11-19 RX ADMIN — FERROUS SULFATE TAB 325 MG (65 MG ELEMENTAL FE) 325 MG: 325 (65 FE) TAB at 09:42

## 2023-11-19 RX ADMIN — PIPERACILLIN AND TAZOBACTAM 3375 MG: 3; .375 INJECTION, POWDER, LYOPHILIZED, FOR SOLUTION INTRAVENOUS at 20:55

## 2023-11-19 RX ADMIN — ACETAMINOPHEN 1000 MG: 500 TABLET, FILM COATED ORAL at 18:29

## 2023-11-19 RX ADMIN — FAMOTIDINE 20 MG: 10 INJECTION INTRAVENOUS at 09:40

## 2023-11-19 RX ADMIN — ACETAMINOPHEN 1000 MG: 500 TABLET, FILM COATED ORAL at 13:53

## 2023-11-19 RX ADMIN — POTASSIUM CHLORIDE, DEXTROSE MONOHYDRATE AND SODIUM CHLORIDE: 150; 5; 450 INJECTION, SOLUTION INTRAVENOUS at 06:43

## 2023-11-19 RX ADMIN — FAMOTIDINE 20 MG: 10 INJECTION INTRAVENOUS at 20:56

## 2023-11-19 RX ADMIN — LEVOTHYROXINE SODIUM 50 MCG: 0.05 TABLET ORAL at 06:27

## 2023-11-19 RX ADMIN — POTASSIUM CHLORIDE, DEXTROSE MONOHYDRATE AND SODIUM CHLORIDE: 300; 5; 450 INJECTION, SOLUTION INTRAVENOUS at 20:55

## 2023-11-19 RX ADMIN — HYDROMORPHONE HYDROCHLORIDE 1 MG: 1 INJECTION, SOLUTION INTRAMUSCULAR; INTRAVENOUS; SUBCUTANEOUS at 07:50

## 2023-11-19 RX ADMIN — POTASSIUM CHLORIDE 40 MEQ: 1500 TABLET, EXTENDED RELEASE ORAL at 06:27

## 2023-11-19 RX ADMIN — APIXABAN 5 MG: 5 TABLET, FILM COATED ORAL at 09:39

## 2023-11-19 RX ADMIN — ACETAMINOPHEN 1000 MG: 500 TABLET, FILM COATED ORAL at 06:27

## 2023-11-19 ASSESSMENT — PAIN DESCRIPTION - LOCATION: LOCATION: BACK

## 2023-11-19 ASSESSMENT — PAIN SCALES - GENERAL
PAINLEVEL_OUTOF10: 0
PAINLEVEL_OUTOF10: 0
PAINLEVEL_OUTOF10: 7

## 2023-11-19 ASSESSMENT — PAIN DESCRIPTION - ORIENTATION: ORIENTATION: LEFT

## 2023-11-19 ASSESSMENT — PAIN DESCRIPTION - DESCRIPTORS: DESCRIPTORS: ACHING

## 2023-11-19 NOTE — PROGRESS NOTES
ACUTE OCCUPATIONAL THERAPY GOALS:   (Developed with and agreed upon by patient and/or caregiver.)  1. Patient will perform grooming with SPV and adaptive equipment as needed. 2. Patient will perform upper body dressing with SPV and adaptive equipment as needed. 3. Patient will perform lower body dressing with SBA and adaptive equipment as needed. 4. Patient will perform bathing with SBA and adaptive equipment as needed. 5. Patient will perform toileting and toilet transfer with SBA and adaptive equipment as needed. 6. Patient will perform ADL functional mobility and tranfers in room with SPV and adaptive equipment as needed. 7. Patient/family to demonstrate knowledge of home safety and DME recommendations. Timeframe: 7 visits     OCCUPATIONAL THERAPY Daily Note and AM       OT Visit Days: 2  Acknowledge Orders  Time  OT Charge Capture  Rehab Caseload Tracker      Teressa Bartlett is a 70 y.o. female   PRIMARY DIAGNOSIS: Malignant neoplasm of ascending colon ([address])  Acute appendicitis [K35.80]  Acute appendicitis with generalized peritonitis, unspecified whether abscess present, unspecified whether gangrene present, unspecified whether perforation present [K35.209]  Cecum mass [K63.89]  Procedure(s) (LRB):  ATTEMPTED APPENDECTOMY LAPAROSCOPIC CONVERTED TO OPEN; ILEOCYSTECTOMY WITH APPENDECTOMY, ILEOCOLOSTOMY, DRAINAGE OF PERITONITIS (N/A)  4 Days Post-Op  Reason for Referral: Generalized Muscle Weakness (M62.81)  Other lack of cordination (R27.8)  Difficulty in walking, Not elsewhere classified (R26.2)  Inpatient: Payor: [employer] / Plan: [employer] G[employer]US HMO / Product Type: *No Product type* /     ASSESSMENT:     REHAB RECOMMENDATIONS:   Recommendation to date pending progress:  Setting:  Home Health Therapy    Equipment:    To Be Determined     ASSESSMENT:  Ms. Iesha Frias presents side-lying in bed and agreeable to OT and PT co-treatment session.  She performed logroll to sit EOB with

## 2023-11-19 NOTE — PROGRESS NOTES
ACUTE PHYSICAL THERAPY GOALS:   (Developed with and agreed upon by patient and/or caregiver.)  STG:  (1.)Ms. Hui will move from supine to sit and sit to supine  with SUPERVISION within 7 treatment day(s).    (2.)Ms. Hui will transfer from bed to chair and chair to bed with SUPERVISION using the least restrictive device within 7 treatment day(s).    (3.)Ms. Hui will ambulate with SUPERVISION for 650 feet with the least restrictive device within 7 treatment day(s).   (4.)Pt. will climb up/down 13 steps with rail and SBA within 7 days  (5.)Pt. will perform AROM exs for 20 reps with 1 rest break to improve endurance within 7 days    PHYSICAL THERAPY: Daily Note AM   (Link to Caseload Tracking: PT Visit Days : 3  Time In/Out PT Charge Capture  Rehab Caseload Tracker  Orders    Haven Hui is a 78 y.o. female   PRIMARY DIAGNOSIS: Malignant neoplasm of ascending colon ([facility])  Acute appendicitis [K35.80]  Acute appendicitis with generalized peritonitis, unspecified whether abscess present, unspecified whether gangrene present, unspecified whether perforation present [K35.209]  Cecum mass [K63.89]  Procedure(s) (LRB):  ATTEMPTED APPENDECTOMY LAPAROSCOPIC CONVERTED TO OPEN; ILEOCYSTECTOMY WITH APPENDECTOMY, ILEOCOLOSTOMY, DRAINAGE OF PERITONITIS (N/A)  4 Days Post-Op  Inpatient: Payor: [employer] MEDICARE / Plan: [employer] GOLD PLUS HMO / Product Type: *No Product type* /     ASSESSMENT:     REHAB RECOMMENDATIONS:   Recommendation to date pending progress:  Setting:  Home Health Therapy    Equipment:    Rolling Walker     ASSESSMENT:  Ms. Hui participated well today.  She noted light-headedness with standing (BP stable in standing).  She was able to transfer and mobilize more today with less assist.  Used RW for mobility today which appeared helpful.  Patient would benefit from continued therapy during admission as well as at d/c.  She may benefit from a RW as well though she lives in a 2-story  SBA CGA Min Mod Max Total  NT x2 Comments:   Level of Assistance [] [] [] [x] [] [] [] [] [] [] []    Distance 40 feet    DME Rolling Walker    Gait Quality Decreased aura  and Decreased step length    Weightbearing Status      Stairs      I=Independent, Mod I=Modified Independent, S=Supervision, SBA=Standby Assistance, CGA=Contact Guard Assistance,   Min=Minimal Assistance, Mod=Moderate Assistance, Max=Maximal Assistance, Total=Total Assistance, NT=Not Tested    PLAN:   FREQUENCY AND DURATION: Daily for duration of hospital stay or until stated goals are met, whichever comes first.    TREATMENT:   TREATMENT:   Therapeutic Activity (26 Minutes): Therapeutic activity included Rolling, Supine to Sit, Scooting, Transfer Training, Ambulation on level ground, Sitting balance , and Standing balance to improve functional Activity tolerance, Balance, Coordination, Mobility, and Strength.     TREATMENT GRID:  N/A    AFTER TREATMENT PRECAUTIONS:  transport chair with RN for x-ray    INTERDISCIPLINARY COLLABORATION:  RN/ PCT and OT/ BOSE    EDUCATION: Education Given To: Patient  Education Provided: Role of Therapy;Plan of Care;Transfer Training  Education Method: Verbal  Education Outcome: Verbalized understanding    TIME IN/OUT:  Time In: 8987  Time Out: [address]  Minutes: [address] G[place] Mccormack, PT

## 2023-11-19 NOTE — PROGRESS NOTES
10.4 MG/DL    Total Bilirubin 0.3 0.2 - 1.1 MG/DL    ALT 18 12 - 65 U/L    AST 19 15 - 37 U/L    Alk Phosphatase 144 (H) 50 - 136 U/L    Total Protein 5.7 (L) 6.3 - 8.2 g/dL    Albumin 2.2 (L) 3.2 - 4.6 g/dL    Globulin 3.5 2.8 - 4.5 g/dL    Albumin/Globulin Ratio 0.6 0.4 - 1.6     Phosphorus    Collection Time: 11/18/23  4:04 AM   Result Value Ref Range    Phosphorus 2.3 2.3 - 3.7 MG/DL   CBC    Collection Time: 11/18/23  4:04 AM   Result Value Ref Range    WBC 8.9 4.3 - 11.1 K/uL    RBC 2.69 (L) 4.05 - 5.2 M/uL    Hemoglobin 7.5 (L) 11.7 - 15.4 g/dL    Hematocrit 25.1 (L) 35.8 - 46.3 %    MCV 93.3 82.0 - 102.0 FL    MCH 27.9 26.1 - 32.9 PG    MCHC 29.9 (L) 31.4 - 35.0 g/dL    RDW 13.3 11.9 - 14.6 %    Platelets 137 658 - 998 K/uL    MPV 10.6 9.4 - 12.3 FL    nRBC 0.02 0.0 - 0.2 K/uL   Hemoglobin A1C    Collection Time: 11/18/23  4:04 AM   Result Value Ref Range    Hemoglobin A1C <3.8 (L) 4.8 - 5.6 %    eAG Cannot be calculated mg/dL   TSH with Reflex    Collection Time: 11/18/23  4:04 AM   Result Value Ref Range    TSH w Free Thyroid if Abnormal 6.94 (H) 0.358 - 3.740 UIU/ML   Lipid Panel    Collection Time: 11/18/23  4:04 AM   Result Value Ref Range    Cholesterol, Total 135 MG/DL    Triglycerides 113 35 - 150 MG/DL    HDL 39 (L) 40 - 60 MG/DL    LDL Calculated 73.4 <100 MG/DL    VLDL Cholesterol Calculated 22.6 6.0 - 23.0 MG/DL    Chol/HDL Ratio 3.5 <200     Procalcitonin    Collection Time: 11/18/23  4:04 AM   Result Value Ref Range    Procalcitonin 0.38 0.00 - 0.49 ng/mL   T4, Free    Collection Time: 11/18/23  4:04 AM   Result Value Ref Range    T4 Free 1.3 0.78 - 1.46 NG/DL   Magnesium    Collection Time: 11/18/23  4:04 AM   Result Value Ref Range    Magnesium 2.2 1.8 - 2.4 mg/dL   PLEASE READ & DOCUMENT PPD TEST IN 24 HRS    Collection Time: 11/18/23  5:40 PM   Result Value Ref Range    PPD, (POC) Negative Negative    mm Induration 0 0 - 5 mm   Hemoglobin and Hematocrit    Collection Time: 11/18/23  7:14 PM

## 2023-11-20 LAB
ALBUMIN SERPL-MCNC: 2.4 G/DL (ref 3.2–4.6)
ALBUMIN/GLOB SERPL: 0.6 (ref 0.4–1.6)
ALP SERPL-CCNC: 220 U/L (ref 50–136)
ALT SERPL-CCNC: 18 U/L (ref 12–65)
ANION GAP SERPL CALC-SCNC: 6 MMOL/L (ref 2–11)
AST SERPL-CCNC: 17 U/L (ref 15–37)
BACTERIA SPEC CULT: ABNORMAL
BACTERIA SPEC CULT: ABNORMAL
BASOPHILS # BLD: 0.1 K/UL (ref 0–0.2)
BASOPHILS NFR BLD: 1 % (ref 0–2)
BILIRUB SERPL-MCNC: 0.4 MG/DL (ref 0.2–1.1)
BUN SERPL-MCNC: 10 MG/DL (ref 8–23)
CALCIUM SERPL-MCNC: 9.3 MG/DL (ref 8.3–10.4)
CHLORIDE SERPL-SCNC: 103 MMOL/L (ref 101–110)
CO2 SERPL-SCNC: 30 MMOL/L (ref 21–32)
CREAT SERPL-MCNC: 0.75 MG/DL (ref 0.6–1)
DIFFERENTIAL METHOD BLD: ABNORMAL
EOSINOPHIL # BLD: 0.3 K/UL (ref 0–0.8)
EOSINOPHIL NFR BLD: 3 % (ref 0.5–7.8)
ERYTHROCYTE [DISTWIDTH] IN BLOOD BY AUTOMATED COUNT: 13.2 % (ref 11.9–14.6)
GLOBULIN SER CALC-MCNC: 4.1 G/DL (ref 2.8–4.5)
GLUCOSE SERPL-MCNC: 115 MG/DL (ref 65–100)
GRAM STN SPEC: ABNORMAL
GRAM STN SPEC: ABNORMAL
HCT VFR BLD AUTO: 29.7 % (ref 35.8–46.3)
HGB BLD-MCNC: 9.1 G/DL (ref 11.7–15.4)
IMM GRANULOCYTES # BLD AUTO: 0.1 K/UL (ref 0–0.5)
IMM GRANULOCYTES NFR BLD AUTO: 1 % (ref 0–5)
LYMPHOCYTES # BLD: 1.6 K/UL (ref 0.5–4.6)
LYMPHOCYTES NFR BLD: 19 % (ref 13–44)
MAGNESIUM SERPL-MCNC: 2.2 MG/DL (ref 1.8–2.4)
MCH RBC QN AUTO: 27.6 PG (ref 26.1–32.9)
MCHC RBC AUTO-ENTMCNC: 30.6 G/DL (ref 31.4–35)
MCV RBC AUTO: 90 FL (ref 82–102)
MONOCYTES # BLD: 0.8 K/UL (ref 0.1–1.3)
MONOCYTES NFR BLD: 10 % (ref 4–12)
NEUTS SEG # BLD: 5.4 K/UL (ref 1.7–8.2)
NEUTS SEG NFR BLD: 66 % (ref 43–78)
NRBC # BLD: 0.02 K/UL (ref 0–0.2)
PHOSPHATE SERPL-MCNC: 2.9 MG/DL (ref 2.3–3.7)
PLATELET # BLD AUTO: 538 K/UL (ref 150–450)
PMV BLD AUTO: 10.3 FL (ref 9.4–12.3)
POTASSIUM SERPL-SCNC: 3.5 MMOL/L (ref 3.5–5.1)
PROCALCITONIN SERPL-MCNC: 1.31 NG/ML (ref 0–0.49)
PROT SERPL-MCNC: 6.5 G/DL (ref 6.3–8.2)
RBC # BLD AUTO: 3.3 M/UL (ref 4.05–5.2)
SERVICE CMNT-IMP: ABNORMAL
SODIUM SERPL-SCNC: 139 MMOL/L (ref 133–143)
WBC # BLD AUTO: 8.2 K/UL (ref 4.3–11.1)

## 2023-11-20 PROCEDURE — 2500000003 HC RX 250 WO HCPCS: Performed by: FAMILY MEDICINE

## 2023-11-20 PROCEDURE — 84100 ASSAY OF PHOSPHORUS: CPT

## 2023-11-20 PROCEDURE — 84145 PROCALCITONIN (PCT): CPT

## 2023-11-20 PROCEDURE — 97530 THERAPEUTIC ACTIVITIES: CPT

## 2023-11-20 PROCEDURE — 6370000000 HC RX 637 (ALT 250 FOR IP): Performed by: FAMILY MEDICINE

## 2023-11-20 PROCEDURE — 85025 COMPLETE CBC W/AUTO DIFF WBC: CPT

## 2023-11-20 PROCEDURE — 2580000003 HC RX 258: Performed by: FAMILY MEDICINE

## 2023-11-20 PROCEDURE — 6360000002 HC RX W HCPCS: Performed by: FAMILY MEDICINE

## 2023-11-20 PROCEDURE — 36415 COLL VENOUS BLD VENIPUNCTURE: CPT

## 2023-11-20 PROCEDURE — 80053 COMPREHEN METABOLIC PANEL: CPT

## 2023-11-20 PROCEDURE — 2000000000 HC ICU R&B

## 2023-11-20 PROCEDURE — 83735 ASSAY OF MAGNESIUM: CPT

## 2023-11-20 PROCEDURE — 6370000000 HC RX 637 (ALT 250 FOR IP): Performed by: SURGERY

## 2023-11-20 PROCEDURE — 6370000000 HC RX 637 (ALT 250 FOR IP): Performed by: NURSE PRACTITIONER

## 2023-11-20 RX ORDER — FAMOTIDINE 20 MG/1
20 TABLET, FILM COATED ORAL 2 TIMES DAILY
Status: DISCONTINUED | OUTPATIENT
Start: 2023-11-20 | End: 2023-11-21 | Stop reason: HOSPADM

## 2023-11-20 RX ADMIN — PIPERACILLIN AND TAZOBACTAM 3375 MG: 3; .375 INJECTION, POWDER, LYOPHILIZED, FOR SOLUTION INTRAVENOUS at 05:30

## 2023-11-20 RX ADMIN — ACETAMINOPHEN 1000 MG: 500 TABLET, FILM COATED ORAL at 05:33

## 2023-11-20 RX ADMIN — BUMETANIDE 1 MG: 1 TABLET ORAL at 09:23

## 2023-11-20 RX ADMIN — HYDROMORPHONE HYDROCHLORIDE 1 MG: 1 INJECTION, SOLUTION INTRAMUSCULAR; INTRAVENOUS; SUBCUTANEOUS at 13:56

## 2023-11-20 RX ADMIN — HYDROMORPHONE HYDROCHLORIDE 1 MG: 1 INJECTION, SOLUTION INTRAMUSCULAR; INTRAVENOUS; SUBCUTANEOUS at 05:26

## 2023-11-20 RX ADMIN — LEVOTHYROXINE SODIUM 50 MCG: 0.05 TABLET ORAL at 05:33

## 2023-11-20 RX ADMIN — ACETAMINOPHEN 1000 MG: 500 TABLET, FILM COATED ORAL at 00:53

## 2023-11-20 RX ADMIN — PIPERACILLIN AND TAZOBACTAM 3375 MG: 3; .375 INJECTION, POWDER, LYOPHILIZED, FOR SOLUTION INTRAVENOUS at 20:49

## 2023-11-20 RX ADMIN — FAMOTIDINE 20 MG: 20 TABLET ORAL at 20:47

## 2023-11-20 RX ADMIN — HYDROMORPHONE HYDROCHLORIDE 1 MG: 1 INJECTION, SOLUTION INTRAMUSCULAR; INTRAVENOUS; SUBCUTANEOUS at 20:49

## 2023-11-20 RX ADMIN — FAMOTIDINE 20 MG: 20 TABLET ORAL at 09:23

## 2023-11-20 RX ADMIN — PIPERACILLIN AND TAZOBACTAM 3375 MG: 3; .375 INJECTION, POWDER, LYOPHILIZED, FOR SOLUTION INTRAVENOUS at 14:02

## 2023-11-20 RX ADMIN — POTASSIUM CHLORIDE, DEXTROSE MONOHYDRATE AND SODIUM CHLORIDE: 300; 5; 450 INJECTION, SOLUTION INTRAVENOUS at 18:11

## 2023-11-20 RX ADMIN — APIXABAN 5 MG: 5 TABLET, FILM COATED ORAL at 09:23

## 2023-11-20 RX ADMIN — APIXABAN 5 MG: 5 TABLET, FILM COATED ORAL at 20:47

## 2023-11-20 ASSESSMENT — PAIN DESCRIPTION - DESCRIPTORS
DESCRIPTORS: ACHING;DISCOMFORT
DESCRIPTORS: DULL
DESCRIPTORS: ACHING;DULL

## 2023-11-20 ASSESSMENT — PAIN SCALES - GENERAL
PAINLEVEL_OUTOF10: 2
PAINLEVEL_OUTOF10: 7
PAINLEVEL_OUTOF10: 8
PAINLEVEL_OUTOF10: 7

## 2023-11-20 ASSESSMENT — PAIN - FUNCTIONAL ASSESSMENT: PAIN_FUNCTIONAL_ASSESSMENT: ACTIVITIES ARE NOT PREVENTED

## 2023-11-20 ASSESSMENT — PAIN DESCRIPTION - LOCATION
LOCATION: ABDOMEN

## 2023-11-20 ASSESSMENT — PAIN SCALES - WONG BAKER: WONGBAKER_NUMERICALRESPONSE: 2

## 2023-11-20 ASSESSMENT — PAIN DESCRIPTION - ORIENTATION
ORIENTATION: MID

## 2023-11-20 ASSESSMENT — PAIN DESCRIPTION - PAIN TYPE: TYPE: SURGICAL PAIN

## 2023-11-20 NOTE — PROGRESS NOTES
78F with a PMHx of anemia, small bowel ischemia, and malignant neoplasm of cecum who presented to the ED on 111/15/2023 with lower abd pain, nausea, vomiting, and diarrhea, and found to have peritonitis which surgery drained on 11/15. Abdominal Pain, Peritonitis vs Diverticulitis   Acute appendicitis, imaging on 11/15 revealed acute appendicitis with severe surrounding periappendiceal inflammatory changes including free fluid via CT scan; Pocalcintonin on 11/20 is 1.31; Wound culture on 11/15 grew E.  Coi + Klebsiella  - Treat peritonitis with ceftriaxone   - Continue to monitor WBC count for changes that would raise concern for perforation of the appendix

## 2023-11-20 NOTE — PROGRESS NOTES
Progress Note   Ronna Muñiz  MRN: [id number]  :1945  Age:78 y.o.    HPI: Ronna Muñiz is a 70 y.o. 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 DIAGNOSIS   A:  \" ILEOCECAL MASS\":  ADENOCARCINOMA, MODERATELY DIFFERENTIATED GROSSLY MEASURING   APPROXIMATELY 6.5 X 3.4 X 3.3 CM. TUMOR INFILTRATES THROUGH MUSCULARIS   [place]RI[place] INTO PERICOLONIC ADIPOSE TISSUE. METASTATIC CARCINOMA TO 11 OF 20   PERICOLONIC LYMPH NODES WITH EXTRACAPSULAR EXTENSION. DEFINITE   LYMPHOVASCULAR AND PERINEURAL INVASION ARE NOT IDENTIFIED. SEE COMMENT          B:  \"ADDITIONAL ILEUM\":  SEGMENT OF SMALL INTESTINE HAVING FIBROUS SEROSAL ADHESIONS NEGATIVE FOR MALIGNANT TUMOR     Comment:  Special studies for mutations of mismatch repair proteins and extended Eduardo   will be performed and results will follow under separate cover. Sign Out Date: 2023  LAKSHMI Moore M.D. Prior to surgery she originally came to the ER on 11/15/23 complaining of progressive lower abd pain for 1-2months   which became severe and constant for 3 days prior to coming to the ER. WBC 18.2k; Hgb 9.0  BUN/Cr 20/0.88  LFTs/Lipase  normal   Lactic acid 1.4      She is s/p laparoscopic RIH with 3D mesh mesh and 5mm ProTAck device 23 by Dr Jose Enrique Hobbs  She had post-op bilateral PEs diagnosed on CT on 23 and treated with Xarelto daily  Her last dose of Xarelto was on 23    She had the below CT suspicious for acute appendicitis with significant RLQ inflammatory changes involving multiple bowel surfaces   Gen surgery was consulted           11/15/23 CT abd/pelvis with IV contrast  EXAMINATION: CT  ABDOMEN / PELVIS   11/15/2023 1:40 PM      LOWER THORAX: Subsegmental atelectasis and/or scarring in the lung bases.      LIVER: There is minimal periportal

## 2023-11-20 NOTE — PROGRESS NOTES
ACUTE PHYSICAL THERAPY GOALS:   (Developed with and agreed upon by patient and/or caregiver.)  STG:  (1.)Ms. Lee Ann Huerta will move from supine to sit and sit to supine  with SUPERVISION within 7 treatment day(s). (2.)Ms. Lee Ann Huerta will transfer from bed to chair and chair to bed with SUPERVISION using the least restrictive device within 7 treatment day(s). (3.)Ms. Lee Ann Huerta will ambulate with SUPERVISION for 650 feet with the least restrictive device within 7 treatment day(s). (4.)Pt. will climb up/down 13 steps with rail and SBA within 7 days  (5.)Pt. will perform AROM exs for 20 reps with 1 rest break to improve endurance within 7 days    PHYSICAL THERAPY: Daily Note PM   (Link to Caseload Tracking: PT Visit Days : 4  Time In/Out PT Charge Capture  Rehab Caseload Tracker  Orders    Moriah Arriaza is a 70 y.o. female   PRIMARY DIAGNOSIS: Malignant neoplasm of ascending colon ([address])  Acute appendicitis [K35.80]  Acute appendicitis with generalized peritonitis, unspecified whether abscess present, unspecified whether gangrene present, unspecified whether perforation present [K35.209]  Cecum mass [K63.89]  Procedure(s) (LRB):  ATTEMPTED APPENDECTOMY LAPAROSCOPIC CONVERTED TO OPEN; ILEOCYSTECTOMY WITH APPENDECTOMY, ILEOCOLOSTOMY, DRAINAGE OF PERITONITIS (N/A)  5 Days Post-Op  Inpatient: Payor: Jami Brewster / Plan: [employer] GOLD PLUS HMO / Product Type: *No Product type* /     ASSESSMENT:     REHAB RECOMMENDATIONS:   Recommendation to date pending progress:  Setting:  Home Health Therapy    Equipment:    Rolling Walker     ASSESSMENT:  Ms. Lee Ann Huerta participated well today. Supine upon arrival.  Work on bed mobility as follows:supine>scooting>eob with SBA. Sat on eob and work on balance without support. Therapist put lotion on her back while sitting on the eob still no LOB. Sit>supine with SBA. While sitting in the bed performs B LE exercises (see below) with guidance.   Remain in the bed with needs in

## 2023-11-20 NOTE — CARE COORDINATION
Discharge planning was discussed with the Critical Care Interdisciplinary Team. Home health services has been recommended. The orders were confirmed with read back with the attending MD.    RN CM met with the patient at the bedside and discussed discharge planning. She lives and stated her friends can assist her if she needs help after she has been discharged home. She confirmed she wants to return home at discharge. Home health services has been recommended. The patient was provided with a list of home health agencies and their quality metrics. No agency was selected at this time. RN CM encouraged her to contact case management today with her home health agency selection. She was provided with an [employer]. If DMEs are needed, she is in agreement to use [employer]. She is currently on oxygen but does not use it at baseline. She may need a rolling walker at discharge. RN CM encouraged her to ask questions. She verbalized understanding and agreement with the discharge plan.

## 2023-11-20 NOTE — PLAN OF CARE
Problem: Pain  Goal: Verbalizes/displays adequate comfort level or baseline comfort level  Outcome: Progressing  Flowsheets (Taken 11/19/2023 1946)  Verbalizes/displays adequate comfort level or baseline comfort level:   Encourage patient to monitor pain and request assistance   Assess pain using appropriate pain scale   Administer analgesics based on type and severity of pain and evaluate response     Problem: Discharge Planning  Goal: Discharge to home or other facility with appropriate resources  Outcome: Progressing  Flowsheets (Taken 11/19/2023 1945)  Discharge to home or other facility with appropriate resources: Identify barriers to discharge with patient and caregiver     Problem: Safety - Adult  Goal: Free from fall injury  Outcome: Progressing     Problem: Skin/Tissue Integrity  Goal: Absence of new skin breakdown  Description: 1. Monitor for areas of redness and/or skin breakdown  2. Assess vascular access sites hourly  3. Every 4-6 hours minimum:  Change oxygen saturation probe site  4. Every 4-6 hours:  If on nasal continuous positive airway pressure, respiratory therapy assess nares and determine need for appliance change or resting period.   Outcome: Progressing

## 2023-11-21 ENCOUNTER — HOME HEALTH ADMISSION (OUTPATIENT)
Dept: HOME HEALTH SERVICES | Facility: HOME HEALTH | Age: 78
End: 2023-11-21
Payer: MEDICARE

## 2023-11-21 VITALS
DIASTOLIC BLOOD PRESSURE: 90 MMHG | TEMPERATURE: 98.4 F | BODY MASS INDEX: 22.08 KG/M2 | WEIGHT: 120 LBS | HEART RATE: 76 BPM | SYSTOLIC BLOOD PRESSURE: 118 MMHG | OXYGEN SATURATION: 96 % | RESPIRATION RATE: 16 BRPM | HEIGHT: 62 IN

## 2023-11-21 PROBLEM — C18.9 PRIMARY COLON CANCER WITH METASTASIS TO 7 OR MORE REGIONAL LYMPH NODES (N2B) (HCC): Status: ACTIVE | Noted: 2023-11-21

## 2023-11-21 PROBLEM — C77.2 PRIMARY COLON CANCER WITH METASTASIS TO 7 OR MORE REGIONAL LYMPH NODES (N2B) (HCC): Status: ACTIVE | Noted: 2023-11-21

## 2023-11-21 LAB
ALBUMIN SERPL-MCNC: 2.3 G/DL (ref 3.2–4.6)
ALBUMIN/GLOB SERPL: 0.6 (ref 0.4–1.6)
ALP SERPL-CCNC: 218 U/L (ref 50–136)
ALT SERPL-CCNC: 18 U/L (ref 12–65)
ANION GAP SERPL CALC-SCNC: 8 MMOL/L (ref 2–11)
AST SERPL-CCNC: 21 U/L (ref 15–37)
BILIRUB SERPL-MCNC: 0.3 MG/DL (ref 0.2–1.1)
BUN SERPL-MCNC: 14 MG/DL (ref 8–23)
CALCIUM SERPL-MCNC: 9.2 MG/DL (ref 8.3–10.4)
CHLORIDE SERPL-SCNC: 105 MMOL/L (ref 101–110)
CO2 SERPL-SCNC: 27 MMOL/L (ref 21–32)
CREAT SERPL-MCNC: 0.72 MG/DL (ref 0.6–1)
ERYTHROCYTE [DISTWIDTH] IN BLOOD BY AUTOMATED COUNT: 13.5 % (ref 11.9–14.6)
GLOBULIN SER CALC-MCNC: 3.8 G/DL (ref 2.8–4.5)
GLUCOSE SERPL-MCNC: 112 MG/DL (ref 65–100)
HCT VFR BLD AUTO: 29.2 % (ref 35.8–46.3)
HGB BLD-MCNC: 9 G/DL (ref 11.7–15.4)
MCH RBC QN AUTO: 28 PG (ref 26.1–32.9)
MCHC RBC AUTO-ENTMCNC: 30.8 G/DL (ref 31.4–35)
MCV RBC AUTO: 90.7 FL (ref 82–102)
MM INDURATION, POC: 0 MM (ref 0–5)
NRBC # BLD: 0.03 K/UL (ref 0–0.2)
PHOSPHATE SERPL-MCNC: 3.7 MG/DL (ref 2.3–3.7)
PLATELET # BLD AUTO: 577 K/UL (ref 150–450)
PMV BLD AUTO: 10 FL (ref 9.4–12.3)
POTASSIUM SERPL-SCNC: 3.8 MMOL/L (ref 3.5–5.1)
PPD, POC: NEGATIVE
PROT SERPL-MCNC: 6.1 G/DL (ref 6.3–8.2)
RBC # BLD AUTO: 3.22 M/UL (ref 4.05–5.2)
SODIUM SERPL-SCNC: 140 MMOL/L (ref 133–143)
WBC # BLD AUTO: 9.4 K/UL (ref 4.3–11.1)

## 2023-11-21 PROCEDURE — 85027 COMPLETE CBC AUTOMATED: CPT

## 2023-11-21 PROCEDURE — 80053 COMPREHEN METABOLIC PANEL: CPT

## 2023-11-21 PROCEDURE — 6370000000 HC RX 637 (ALT 250 FOR IP): Performed by: NURSE PRACTITIONER

## 2023-11-21 PROCEDURE — 2580000003 HC RX 258: Performed by: FAMILY MEDICINE

## 2023-11-21 PROCEDURE — 6370000000 HC RX 637 (ALT 250 FOR IP): Performed by: SURGERY

## 2023-11-21 PROCEDURE — 97530 THERAPEUTIC ACTIVITIES: CPT

## 2023-11-21 PROCEDURE — 36415 COLL VENOUS BLD VENIPUNCTURE: CPT

## 2023-11-21 PROCEDURE — 6360000002 HC RX W HCPCS: Performed by: FAMILY MEDICINE

## 2023-11-21 PROCEDURE — 84100 ASSAY OF PHOSPHORUS: CPT

## 2023-11-21 PROCEDURE — 99024 POSTOP FOLLOW-UP VISIT: CPT | Performed by: SURGERY

## 2023-11-21 PROCEDURE — 6370000000 HC RX 637 (ALT 250 FOR IP): Performed by: FAMILY MEDICINE

## 2023-11-21 RX ORDER — HYDROCODONE BITARTRATE AND ACETAMINOPHEN 5; 325 MG/1; MG/1
1-2 TABLET ORAL EVERY 8 HOURS PRN
Qty: 28 TABLET | Refills: 0 | Status: SHIPPED | OUTPATIENT
Start: 2023-11-21 | End: 2023-11-28

## 2023-11-21 RX ADMIN — PIPERACILLIN AND TAZOBACTAM 3375 MG: 3; .375 INJECTION, POWDER, LYOPHILIZED, FOR SOLUTION INTRAVENOUS at 04:56

## 2023-11-21 RX ADMIN — APIXABAN 5 MG: 5 TABLET, FILM COATED ORAL at 08:42

## 2023-11-21 RX ADMIN — HYDROMORPHONE HYDROCHLORIDE 1 MG: 1 INJECTION, SOLUTION INTRAMUSCULAR; INTRAVENOUS; SUBCUTANEOUS at 06:00

## 2023-11-21 RX ADMIN — LEVOTHYROXINE SODIUM 50 MCG: 0.05 TABLET ORAL at 08:42

## 2023-11-21 RX ADMIN — BUMETANIDE 1 MG: 1 TABLET ORAL at 08:42

## 2023-11-21 RX ADMIN — FAMOTIDINE 20 MG: 20 TABLET ORAL at 08:42

## 2023-11-21 ASSESSMENT — PAIN SCALES - GENERAL
PAINLEVEL_OUTOF10: 2
PAINLEVEL_OUTOF10: 2
PAINLEVEL_OUTOF10: 7

## 2023-11-21 ASSESSMENT — PAIN DESCRIPTION - DESCRIPTORS: DESCRIPTORS: ACHING;DISCOMFORT

## 2023-11-21 ASSESSMENT — PAIN DESCRIPTION - ORIENTATION: ORIENTATION: MID

## 2023-11-21 ASSESSMENT — PAIN DESCRIPTION - LOCATION: LOCATION: ABDOMEN

## 2023-11-21 NOTE — CARE COORDINATION
0840: Discharge planning was discussed with the Critical Care Interdisciplinary Team. The patient is a potential discharge home today with home health services and a rolling walker. The rolling walker order was confirmed with the attending MD.    9861: RN CM met with the patient at the bedside and reviewed her discharge plan. She confirmed she is in agreement to discharge home today with home health services and a rolling walker. A referral was sent to [address] [place] [address] for DME services. She selected [facility] for home health services. A referral was sent to the home health agency. RN CM encouraged her to ask questions. She verbalized understanding and agreement with the discharge plan. The Important Message from Medicare letter was delivered and explained to the patient. She verbalized understanding of the information and signed the letter. The patient was given a copy of the letter and the original was placed in the chart. 6691: KAYCEE SALGADO spoke with Pranay Joe at [address] [place] [address]. The faxed referral failed. She requested for case management to send the referral to [phone]. The fax number was confirmed with read back and was resent to [address] [place] [address] in [facility] [address]. If accepted, the rolling walker will be delivered to the patient today. 1031: Discharge planning was discussed with the General Surgery NP and [facility]. 1118: Discharge planning was discussed with ZAIN Nails MD.

## 2023-11-21 NOTE — PLAN OF CARE
Completed adequate for discharge.   Problem: Pain  Goal: Verbalizes/displays adequate comfort level or baseline comfort level  11/21/2023 1321 by China Rodriguez RN  Outcome: Completed  Flowsheets (Taken 11/21/2023 0744)  Verbalizes/displays adequate comfort level or baseline comfort level:   Encourage patient to monitor pain and request assistance   Assess pain using appropriate pain scale  11/21/2023 0311 by Sterling Vazquez RN  Outcome: Progressing  Flowsheets (Taken 11/20/2023 1900)  Verbalizes/displays adequate comfort level or baseline comfort level:   Encourage patient to monitor pain and request assistance   Assess pain using appropriate pain scale   Administer analgesics based on type and severity of pain and evaluate response   Implement non-pharmacological measures as appropriate and evaluate response   Consider cultural and social influences on pain and pain management   Notify Licensed Independent Practitioner if interventions unsuccessful or patient reports new pain     Problem: Discharge Planning  Goal: Discharge to home or other facility with appropriate resources  11/21/2023 1321 by China Rodriguez RN  Outcome: Completed  Flowsheets  Taken 11/21/2023 1250  Discharge to home or other facility with appropriate resources:   Identify barriers to discharge with patient and caregiver   Arrange for needed discharge resources and transportation as appropriate   Refer to discharge planning if patient needs post-hospital services based on physician order or complex needs related to functional status, cognitive ability or social support system  Taken 11/21/2023 0744  Discharge to home or other facility with appropriate resources:   Identify barriers to discharge with patient and caregiver   Arrange for needed discharge resources and transportation as appropriate   Refer to discharge planning if patient needs post-hospital services based on physician order or complex needs related to functional status,

## 2023-11-21 NOTE — DISCHARGE SUMMARY
[place] [address]  [phone]   Discharge Summary     Haven Hui  MRN: [id number]     : 1945     Age: 78 y.o.                          Admit date: 11/15/2023     Discharge date: 23         Attending Physician: MARLA ROMERO MD, MD, FACS  Primary Discharge Diagnosis:   Principal Problem:    Malignant neoplasm of cecum (HCC)  Active Problems:    Pulmonary embolism, bilateral (HCC)    Bilateral pulmonary embolism (HCC)    Coagulopathy (HCC) from recent Xarelto use    Ileitis on CT    Abnormal liver CT    Acute hypoxemic respiratory failure (HCC)    Hypervolemia    Pleural effusion, bilateral    Primary colon cancer with metastasis to 7 or more regional lymph nodes (N2b) (HCC)  Resolved Problems:    * No resolved hospital problems. *    Primary Operations or Procedures Performed :  Procedure(s):  ATTEMPTED APPENDECTOMY LAPAROSCOPIC CONVERTED TO OPEN; ILEOCYSTECTOMY WITH APPENDECTOMY, ILEOCOLOSTOMY, DRAINAGE OF PERITONITIS     Brief History and Reason for Admission: Haven Hui was admitted with the following history of present illness.      HPI: Haven Hui is a 78 y.o. 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       DIAGNOSIS   A:  \" ILEOCECAL MASS\":  ADENOCARCINOMA, MODERATELY DIFFERENTIATED GROSSLY MEASURING   APPROXIMATELY 6.5 X 3.4 X 3.3 CM. TUMOR INFILTRATES THROUGH MUSCULARIS   PROPRIA INTO PERICOLONIC ADIPOSE TISSUE. METASTATIC CARCINOMA TO 11 OF 20   PERICOLONIC LYMPH NODES WITH EXTRACAPSULAR EXTENSION. DEFINITE   LYMPHOVASCULAR AND PERINEURAL INVASION ARE NOT IDENTIFIED. SEE COMMENT          B:  \"ADDITIONAL ILEUM\":  SEGMENT OF SMALL INTESTINE HAVING FIBROUS SEROSAL ADHESIONS NEGATIVE FOR MALIGNANT TUMOR     Comment:  Special studies  for mutations of mismatch repair proteins and extended Eduardo   will be performed and results will follow under separate cover. Sign Out Date: 11/17/2023  LAKSHMI Hodges M.D. Prior to surgery she originally came to the ER on 11/15/23 complaining of progressive lower abd pain for 1-2months   which became severe and constant for 3 days prior to coming to the ER. WBC 18.2k; Hgb 9.0  BUN/Cr 20/0.88  LFTs/Lipase  normal   Lactic acid 1.4        She is s/p laparoscopic RIH with 3D mesh mesh and 5mm ProTAck device 7/21/23 by  [facility]  She had post-op bilateral PEs diagnosed on CT on 8/7/23 and treated with Xarelto daily  Her last dose of Xarelto was on 11/14/23     She had the below CT suspicious for acute appendicitis with significant RLQ inflammatory changes involving multiple bowel surfaces   Gen surgery was consulted              11/15/23 CT abd/pelvis with IV contrast  EXAMINATION: CT  ABDOMEN / PELVIS   11/15/2023 1:40 PM      LOWER THORAX: Subsegmental atelectasis and/or scarring in the lung bases. LIVER: There is minimal periportal edema. New 1.4 cm hypoattenuating area in the left hepatic lobe. BILIARY: Gallbladder is unremarkable. No intrahepatic or extrahepatic biliary ductal dilation. SPLEEN: No splenomegaly or concerning lesion. PANCREAS: No pancreatic duct dilation or mass. ADRENALS: No adrenal nodules or hypertrophy. URINARY: Kidneys are symmetric in size and enhancement. 1.1 severe indeterminate density exophytic lesion on the right lower pole remains stable. 1.0 cm cyst on the left kidney. No hydronephrosis or left urolithiasis. Ureters and urinary bladder are within normal limits. BOWEL: The appendix is dilated to 2.0 cm in diameter with wall thickening and periappendiceal fat stranding and free fluid. The largest pocket of free fluid measures up to approximately 2.7 cm, though no discrete rim enhancement to suggest abscess. No evidence of free air.

## 2023-11-21 NOTE — PROGRESS NOTES
Patient had requested to wait until sons arrived to complete discharge teaching and paperwork. Dressing changes and AVS discharge teaching completed and questions answered. Patient and sons verbalized understanding. All monitors and lines removed. Personal belongings including cell phone and , as well as new walker given to sons to take home with patient. Patient taken by wheelchair to discharge home with family via personal transport.

## 2023-11-21 NOTE — PLAN OF CARE
Problem: Pain  Goal: Verbalizes/displays adequate comfort level or baseline comfort level  11/21/2023 0311 by Stewart Tovar RN  Outcome: Progressing  Flowsheets (Taken 11/20/2023 1900)  Verbalizes/displays adequate comfort level or baseline comfort level:   Encourage patient to monitor pain and request assistance   Assess pain using appropriate pain scale   Administer analgesics based on type and severity of pain and evaluate response   Implement non-pharmacological measures as appropriate and evaluate response   Consider cultural and social influences on pain and pain management   Notify Licensed Independent Practitioner if interventions unsuccessful or patient reports new pain  11/20/2023 1815 by Ana Armstrong RN  Outcome: Progressing     Problem: Discharge Planning  Goal: Discharge to home or other facility with appropriate resources  11/21/2023 0311 by Stewart Tovar RN  Outcome: Progressing  Flowsheets (Taken 11/20/2023 1900)  Discharge to home or other facility with appropriate resources:   Identify barriers to discharge with patient and caregiver   Arrange for needed discharge resources and transportation as appropriate   Identify discharge learning needs (meds, wound care, etc)   Arrange for interpreters to assist at discharge as needed   Refer to discharge planning if patient needs post-hospital services based on physician order or complex needs related to functional status, cognitive ability or social support system  11/20/2023 1815 by Ana Armstrong RN  Outcome: Progressing     Problem: Safety - Adult  Goal: Free from fall injury  11/21/2023 0311 by Stewart Tovar RN  Outcome: Progressing  11/20/2023 1815 by Ana Armstrong RN  Outcome: Progressing     Problem: Skin/Tissue Integrity  Goal: Absence of new skin breakdown  Description: 1. Monitor for areas of redness and/or skin breakdown  2. Assess vascular access sites hourly  3.   Every 4-6 hours minimum:  Change oxygen saturation probe site  4.  Every 4-6 hours:  If on nasal continuous positive airway pressure, respiratory therapy assess nares and determine need for appliance change or resting period.  11/21/2023 0311 by Cathy Truong, RN  Outcome: Progressing  11/20/2023 1815 by Olayinka Madsen, RN  Outcome: Progressing

## 2023-11-21 NOTE — DISCHARGE INSTRUCTIONS
Dressings/Wound Care  Empty the drain as often as needed to keep it suctioning (compressed) at all times  Replace the dry gauze and tape around the drain exit sites as needed for drainage    Replace dry gauze and tape over the midline incision daily  Try to keep incisions as dry as possible to lower risk of infection. Activity  No heavy lifting (>5lbs) for 6 weeks to reduce risk of developing a hernia in the incisions. No driving until you are off pain meds for 24hrs and have no pain with movements associated with driving. Meds  Pain prescription (Norco) electronically sent to your pharmacy    Follow-up  Follow-up with Dr Susan Blancas in approx 8-10 days for wound check and drain removal in the office. Adiel Blank NP works at the [facility] location    Then see Dr Jerri Gooden in 3 months if doing well (or sooner if needed).   [address], [address]  [phone]    Follow-up with your heme/oncology physician Dr Rj Mims December 11 (at your already scheduled appt)   for iron deficiency anemia and metastatic colon carcinoma      Follow-up with GI in early March to consider colonoscopy      Diet  Soups, Juices, Liquids, Yogurts, and Puddings for 1-2 days  Then soft diet until follow-up

## 2023-11-21 NOTE — PROGRESS NOTES
Hospitalist Progress Note   Admit Date:  11/15/2023 11:38 AM   Name:  Homar Miller   Age:  70 y.o. Sex:  female  :  1945   MRN:  [id number]   Room:  [facility]/    Presenting/Chief Complaint: Abdominal Pain, Emesis, and Diarrhea     Reason(s) for Admission: Acute appendicitis [K35.80]  Acute appendicitis with generalized peritonitis, unspecified whether abscess present, unspecified whether gangrene present, unspecified whether perforation present [K35.209]  Cecum mass [facility]     Hospital Course:   70 y.o. 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 Underwent surgical intervention 11/15. Developed postoperative hypoxia. Was transition to intensive care. Hospitalist consulted for medical management. We will take over primary attending role. Subjective & 24hr Events:   Feeling better today. Would like to expand diet. Will discuss with surgery advance as tolerated. No nausea. No sharp pain has some dull aches in abdomen. Looking much better. Work with PT, OT. Likely 1-2 more days of hospitalization. Assessment & Plan:   Cecum mass  Underwent surgical intervention 11/15  -Follow-up pathology     Pulmonary embolism, bilateral  -apixaban    Anemia   -transfuse Hgb < 7 or <8 with cardiac symptoms  2023: Hgb 9.0    Leukocytosis  -Follow cultures  -Pip-tazo     Lower abdominal pain  -Dilaudid     Acute hypoxemic respiratory failure  Secondary to volume overload  -bumetanide changed to po qd tomorrow  2023: resolved      PT/OT evals and PPD needed/ordered? Yes  Diet:  ADULT DIET; Easy to Chew  VTE prophylaxis: SCD's   Code status: Full Code      Non-peripheral Lines and Tubes (if present):      Closed/Suction Drain Midline Abdomen Bulb (Active)          Telemetry (if present):  Cardiac/Telemetry Monitor On:  Bedside monitor in use        CASPERELGIN PRUETT

## 2023-11-21 NOTE — CARE COORDINATION
11/21/23 1318   Service Assessment   Patient Orientation Alert and Oriented   Cognition Alert   History Provided By Patient   Primary [address] Family Members;Friends/Neighbors   PCP Verified by CM Yes   Prior Functional Level Independent in ADLs/IADLs   Current Functional Level Independent in ADLs/IADLs   Can patient return to prior living arrangement Yes   Ability to make needs known: Good   Family able to assist with home care needs: Yes   Financial Resources [employer] None   Social/Functional History   Lives With Alone   Type of Home Condo   Bathroom Equipment None   Home Equipment None   ADL Assistance Independent   Active  Yes   Mode of Transportation Car   Discharge Planning   Type of [address] Prior To Admission None   [address]   DME Ordered? Walker   Patient expects to be discharged to: House  (condo)   Services At/After Discharge   Transition of Care Consult (CM Consult) Discharge Planning;Home [address] Discharge Home Health   Confirm Follow Up Transport Family   Condition of Participation: Discharge Planning   The Patient and/or Patient Representative was provided with a Choice of Provider? Patient   The Patient and/Or Patient Representative agree with the Discharge Plan? Yes   Freedom of Choice list was provided with basic dialogue that supports the patient's individualized plan of care/goals, treatment preferences, and shares the quality data associated with the providers? Yes     The patient is discharging home with [facility]. The home health agency confirmed they have accepted the patient for services. A rolling walker was delivered to the patient prior to discharge. The patient verbalized understanding and agreement with the discharge plan.     Discharge planning was discussed with the attending MD, the General Surgery NP, and primary nurse

## 2023-11-21 NOTE — PROGRESS NOTES
ACUTE PHYSICAL THERAPY GOALS:   (Developed with and agreed upon by patient and/or caregiver.)  STG:  (1.)Ms. Hui will move from supine to sit and sit to supine  with SUPERVISION within 7 treatment day(s).    (2.)Ms. Hui will transfer from bed to chair and chair to bed with SUPERVISION using the least restrictive device within 7 treatment day(s).    (3.)Ms. Hui will ambulate with SUPERVISION for 650 feet with the least restrictive device within 7 treatment day(s).   (4.)Pt. will climb up/down 13 steps with rail and SBA within 7 days  (5.)Pt. will perform AROM exs for 20 reps with 1 rest break to improve endurance within 7 days    PHYSICAL THERAPY: Daily Note AM   (Link to Caseload Tracking: PT Visit Days : 5  Time In/Out PT Charge Capture  Rehab Caseload Tracker  Orders    Haven Hui is a 78 y.o. female   PRIMARY DIAGNOSIS: Malignant neoplasm of ascending colon (HCC)  Acute appendicitis [K35.80]  Acute appendicitis with generalized peritonitis, unspecified whether abscess present, unspecified whether gangrene present, unspecified whether perforation present [K35.209]  Cecum mass [K63.89]  Procedure(s) (LRB):  ATTEMPTED APPENDECTOMY LAPAROSCOPIC CONVERTED TO OPEN; ILEOCYSTECTOMY WITH APPENDECTOMY, ILEOCOLOSTOMY, DRAINAGE OF PERITONITIS (N/A)  6 Days Post-Op  Inpatient: Payor: [employer] MEDICARE / Plan: [employer] GOLD PLUS HMO / Product Type: *No Product type* /     ASSESSMENT:     REHAB RECOMMENDATIONS:   Recommendation to date pending progress:  Setting:  Home Health Therapy    Equipment:    Rolling Walker     ASSESSMENT:  Ms. Hui participated well today.   SBA to sit up slowly.  SBA to stand and she used restroom with RN.  She then ambulated 80 feet with RW and went up and down 3 steps with one rail SBA slowly.  She needed to sit briefly due to being slightly lightheaded.  She rested a few minutes and sitting and ambulated 70 feet back to her bed.  She needed min assist for LE's into  Tested    BALANCE: Good Fair+ Fair Fair- Poor NT Comments   Sitting Static [x] [] [] [] [] []    Sitting Dynamic [] [x] [] [] [] []              Standing Static [x] [x] [] [] [] [] With RW   Standing Dynamic [] [x] [x] [] [] [] With RW     GAIT: I Mod I S SBA CGA Min Mod Max Total  NT x2 Comments:   Level of Assistance [] [] [] [x] [] [] [] [] [] [] []    Distance 80 (plus 70 feet) feet    DME Rolling Walker    Gait Quality Decreased aura  and Decreased step length    Weightbearing Status      Stairs Stairs/Curb  Stairs?: Yes  Stairs  # Steps : 3 (up and down)  Rails: Right ascending  Assistance: Stand by assistance    I=Independent, Mod I=Modified Independent, S=Supervision, SBA=Standby Assistance, CGA=Contact Guard Assistance,   Min=Minimal Assistance, Mod=Moderate Assistance, Max=Maximal Assistance, Total=Total Assistance, NT=Not Tested    PLAN:   FREQUENCY AND DURATION: Daily for duration of hospital stay or until stated goals are met, whichever comes first.    TREATMENT:   TREATMENT:   Therapeutic Activity (26 Minutes): Therapeutic activity included Supine to Sit, Sit to Supine, Scooting, Transfer Training, Ambulation on level ground, Stair Training, Sitting balance , and Standing balance to improve functional Activity tolerance, Balance, Coordination, Mobility, Strength, and ROM.     TREATMENT GRID:  B Date:  11/20 Date:   Date:     Activity/Exercise Parameters Parameters Parameters   Ankle pumps 12     Quad sets 12     Heel slides 12     Hip ab/ad 12     SAQ 12                         AFTER TREATMENT PRECAUTIONS: Bed, Bed/Chair Locked, Call light within reach, Needs within reach, and RN notified    INTERDISCIPLINARY COLLABORATION:  RN/ PCT    EDUCATION:      TIME IN/OUT:  Time In: 1035  Time Out: 1101  Minutes: LILA Padgett

## 2023-11-21 NOTE — PROGRESS NOTES
Progress Note   Alesha Comment  MRN: [id number]  :1945  Age:78 y.o.    HPI: Alesha Comment is a 70 y.o. 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 DIAGNOSIS   A:  \" ILEOCECAL MASS\":  ADENOCARCINOMA, MODERATELY DIFFERENTIATED GROSSLY MEASURING   APPROXIMATELY 6.5 X 3.4 X 3.3 CM. TUMOR INFILTRATES THROUGH MUSCULARIS   PROPRIA INTO PERICOLONIC ADIPOSE TISSUE. METASTATIC CARCINOMA TO 11 OF 20   PERICOLONIC LYMPH NODES WITH EXTRACAPSULAR EXTENSION. DEFINITE   LYMPHOVASCULAR AND PERINEURAL INVASION ARE NOT IDENTIFIED. SEE COMMENT          B:  \"ADDITIONAL ILEUM\":  SEGMENT OF SMALL INTESTINE HAVING FIBROUS SEROSAL ADHESIONS NEGATIVE FOR MALIGNANT TUMOR     Comment:  Special studies for mutations of mismatch repair proteins and extended Eduardo   will be performed and results will follow under separate cover. Sign Out Date: 2023  LAKSHMI Campos M.D. Prior to surgery she originally came to the ER on 11/15/23 complaining of progressive lower abd pain for 1-2months   which became severe and constant for 3 days prior to coming to the ER. WBC 18.2k; Hgb 9.0  BUN/Cr 20/0.88  LFTs/Lipase  normal   Lactic acid 1.4      She is s/p laparoscopic RIH with 3D mesh mesh and 5mm ProTAck device 23 by Dr Gabi Hester  She had post-op bilateral PEs diagnosed on CT on 23 and treated with Xarelto daily  Her last dose of Xarelto was on 23    She had the below CT suspicious for acute appendicitis with significant RLQ inflammatory changes involving multiple bowel surfaces   Gen surgery was consulted           11/15/23 CT abd/pelvis with IV contrast  EXAMINATION: CT  ABDOMEN / PELVIS   11/15/2023 1:40 PM      LOWER THORAX: Subsegmental atelectasis and/or scarring in the lung bases.      LIVER: There is minimal periportal edema. New 1.4 cm hypoattenuating area in the left hepatic lobe.    BILIARY: Gallbladder is unremarkable. No intrahepatic or extrahepatic biliary ductal dilation.  SPLEEN: No splenomegaly or concerning lesion.  PANCREAS: No pancreatic duct dilation or mass.  ADRENALS: No adrenal nodules or hypertrophy.     URINARY: Kidneys are symmetric in size and enhancement. 1.1 severe indeterminate density exophytic lesion on the right lower pole remains stable.   1.0 cm cyst on the left kidney. No hydronephrosis or left urolithiasis. Ureters and urinary bladder are within normal limits.     BOWEL: The appendix is dilated to 2.0 cm in diameter with wall thickening and periappendiceal fat stranding and free fluid.   The largest pocket of free fluid measures up to approximately 2.7 cm, though no discrete rim enhancement to suggest abscess.   No evidence of free air. There is wall thickening and inflammatory changes involving the adjacent duodenum, jejunum, ileum, and cecum which is most likely reactive.   No evidence of bowel obstruction. Colonic diverticulosis without evidence of diverticulitis.     VASCULAR: The abdominal aorta and iliac arterial system are nonaneurysmal with mild atherosclerotic calcifications.  NODES: No lymphadenopathy.  FLUID: No free intraperitoneal fluid.  REPRODUCTIVE: Unremarkable  BONES/SOFT TISSUES: No acute or aggressive osseous abnormality. Regional soft tissues are unremarkable.        IMPRESSION:  1. Acute appendicitis with severe surrounding periappendiceal inflammatory changes including free fluid but no discrete organized abscess or free air to  confirm perforation.  2. Inflammatory changes involving the adjacent small bowel and cecum which is most likely reactive.  3. New 1.4 cm hypoattenuating lesion in the liver is indeterminate. A small developing hepatic abscess is a consideration.   Consider short-term follow-up CT to reassess.  4. Colonic diverticulosis.          =>11/17/2023 Chest

## 2023-11-22 ENCOUNTER — HOME CARE VISIT (OUTPATIENT)
Dept: SCHEDULING | Facility: HOME HEALTH | Age: 78
End: 2023-11-22

## 2023-11-22 ENCOUNTER — CARE COORDINATION (OUTPATIENT)
Dept: CARE COORDINATION | Facility: CLINIC | Age: 78
End: 2023-11-22

## 2023-11-22 VITALS
TEMPERATURE: 98.7 F | HEART RATE: 92 BPM | OXYGEN SATURATION: 97 % | WEIGHT: 120 LBS | DIASTOLIC BLOOD PRESSURE: 58 MMHG | RESPIRATION RATE: 18 BRPM | SYSTOLIC BLOOD PRESSURE: 102 MMHG | BODY MASS INDEX: 22.08 KG/M2 | HEIGHT: 62 IN

## 2023-11-22 LAB
BACTERIA SPEC CULT: NORMAL
SERVICE CMNT-IMP: NORMAL

## 2023-11-22 PROCEDURE — 0221000100 HH NO PAY CLAIM PROCEDURE

## 2023-11-22 PROCEDURE — G0299 HHS/HOSPICE OF RN EA 15 MIN: HCPCS

## 2023-11-22 ASSESSMENT — ENCOUNTER SYMPTOMS
DIARRHEA: 0
ABDOMINAL PAIN: 0
RESPIRATORY NEGATIVE: 1
GASTROINTESTINAL NEGATIVE: 1
CONSTIPATION: 0
SHORTNESS OF BREATH: 0
ABDOMINAL DISTENTION: 0
COUGH: 0
NAUSEA: 0
DYSPNEA ACTIVITY LEVEL: AFTER AMBULATING MORE THAN 20 FT
STOOL DESCRIPTION: FORMED
VOMITING: 0

## 2023-11-22 NOTE — CARE COORDINATION
Care Transitions Initial Follow Up Call    Call within 2 business days of discharge: Yes    Patient Current Location:  Home: [address] [place] [address] [address]  [address] [place] [address]    LPN Care Coordinator contacted the patient by telephone to perform post hospital discharge assessment. Verified name and  with patient as identifiers. Provided introduction to self, and explanation of the LPN Care Coordinator role. Patient: Aviva Acevedo Patient : 1945   MRN: [id number]  Reason for Admission: abdominal pain, n/v/d  Discharge Date: 23 RARS: Readmission Risk Score: 12.9      Last Discharge Facility       Date Complaint Diagnosis Description Type Department Provider    11/15/23 Abdominal Pain; Emesis; Diarrhea Acute appendicitis with generalized peritonitis, unspecified whether abscess present, unspecified whether gangrene present, unspecified whether perforation present . .. ED to Hosp-Admission (Discharged) (ADMITTED) Joseph Steinberg MD; Melvin Maria. .. Was this an external facility discharge? No Discharge Facility: [facility]    Challenges to be reviewed by the provider   Additional needs identified to be addressed with provider: No  none               Method of communication with provider: none. LPN Care Coordinator reviewed discharge instructions, medical action plan, and red flags with patient who verbalized understanding. The patient was given an opportunity to ask questions and does not have any further questions or concerns at this time. Were discharge instructions available to patient? Yes. Reviewed appropriate site of care based on symptoms and resources available to patient including: PCP  Specialist  Urgent care clinics  [facility], [facility]  When to call [address] Leon. The patient agrees to contact the PCP office for questions related to their healthcare. Advance Care Planning:   Does patient have an Advance Directive: reviewed and current.     Medication reconciliation

## 2023-11-22 NOTE — PROGRESS NOTES
[address] [place] [address]  [phone]        Name: Olga Rodriguez      MRN: [id number]       : 1945             PCP: Marge Dale MD       CC:    Chief Complaint   Patient presents with    Post-Op Check     GPO 11/15/23 Lap converted to open ileocystectomy w/appendectomy (Alpa Bennett)       HPI:  .Olga Rodriguez is a 70y.o. year-old female who presents for a post-op visit s/p ATTEMPTED APPENDECTOMY LAPAROSCOPIC CONVERTED TO OPEN; ILEOCYSTECTOMY WITH APPENDECTOMY, ILEOCOLOSTOMY, DRAINAGE OF PERITONITIS  done per Dr. Alpa Bennett on 11/15/2023 . 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 DIAGNOSIS   A:  \" ILEOCECAL MASS\":  ADENOCARCINOMA, MODERATELY DIFFERENTIATED GROSSLY MEASURING   APPROXIMATELY 6.5 X 3.4 X 3.3 CM. TUMOR INFILTRATES THROUGH MUSCULARIS PROPRIA INTO PERICOLONIC ADIPOSE TISSUE. METASTATIC CARCINOMA TO 11 OF 20   PERICOLONIC LYMPH NODES WITH EXTRACAPSULAR EXTENSION. DEFINITE   LYMPHOVASCULAR AND PERINEURAL INVASION ARE NOT IDENTIFIED. SEE COMMENT          B:  \"ADDITIONAL ILEUM\":  SEGMENT OF SMALL INTESTINE HAVING FIBROUS SEROSAL ADHESIONS NEGATIVE FOR MALIGNANT TUMOR     =>Patient was discharged home 2023 and instructed to follow up today for BREEZY drain removal and staples to be removed. Patient reports she is doing well, tolerating a regular diet, voiding without complications and having  normal bowel movements. =>Patient's history includes s/p laparoscopic RIH with 3D mesh mesh and 5mm ProTAck device 23 by Dr Aleman Drought  She had post-op bilateral PEs diagnosed on CT on 23 and treated with Xarelto daily which she continues to take .      =>Patient is to Follow-up with your heme/oncology physician Dr Mumtaz Crow  (at your already scheduled appt)   for iron deficiency anemia and metastatic colon carcinoma and to Follow-up with GI in early March to consider colonoscopy screening.        -Her BREEZY

## 2023-11-25 ENCOUNTER — HOME CARE VISIT (OUTPATIENT)
Dept: SCHEDULING | Facility: HOME HEALTH | Age: 78
End: 2023-11-25

## 2023-11-25 VITALS
DIASTOLIC BLOOD PRESSURE: 68 MMHG | RESPIRATION RATE: 17 BRPM | OXYGEN SATURATION: 98 % | TEMPERATURE: 98.7 F | SYSTOLIC BLOOD PRESSURE: 110 MMHG | HEART RATE: 88 BPM

## 2023-11-25 PROCEDURE — G0299 HHS/HOSPICE OF RN EA 15 MIN: HCPCS

## 2023-11-25 ASSESSMENT — ENCOUNTER SYMPTOMS
DYSPNEA ACTIVITY LEVEL: AFTER AMBULATING MORE THAN 20 FT
STOOL DESCRIPTION: FORMED

## 2023-11-27 ENCOUNTER — HOME CARE VISIT (OUTPATIENT)
Dept: SCHEDULING | Facility: HOME HEALTH | Age: 78
End: 2023-11-27
Payer: MEDICARE

## 2023-11-27 VITALS
SYSTOLIC BLOOD PRESSURE: 126 MMHG | TEMPERATURE: 97.1 F | DIASTOLIC BLOOD PRESSURE: 80 MMHG | HEART RATE: 86 BPM | OXYGEN SATURATION: 98 % | RESPIRATION RATE: 16 BRPM

## 2023-11-27 VITALS
OXYGEN SATURATION: 99 % | DIASTOLIC BLOOD PRESSURE: 80 MMHG | RESPIRATION RATE: 16 BRPM | TEMPERATURE: 98.6 F | SYSTOLIC BLOOD PRESSURE: 126 MMHG | HEART RATE: 87 BPM

## 2023-11-27 PROCEDURE — G0151 HHCP-SERV OF PT,EA 15 MIN: HCPCS

## 2023-11-27 PROCEDURE — G0152 HHCP-SERV OF OT,EA 15 MIN: HCPCS

## 2023-11-27 ASSESSMENT — ENCOUNTER SYMPTOMS
DYSPNEA ACTIVITY LEVEL: AFTER AMBULATING 10 - 20 FT
PAIN LOCATION - PAIN QUALITY: ACHING

## 2023-11-28 ENCOUNTER — HOME CARE VISIT (OUTPATIENT)
Dept: SCHEDULING | Facility: HOME HEALTH | Age: 78
End: 2023-11-28
Payer: MEDICARE

## 2023-11-28 ENCOUNTER — CARE COORDINATION (OUTPATIENT)
Dept: CARE COORDINATION | Facility: CLINIC | Age: 78
End: 2023-11-28

## 2023-11-28 PROCEDURE — G0299 HHS/HOSPICE OF RN EA 15 MIN: HCPCS

## 2023-11-28 NOTE — CARE COORDINATION
Care Transitions Follow Up Call    Patient Current Location:  Home: [address] [place] [address] [address]  [address] Coordinator contacted the patient by telephone to follow up after admission on 11/15/2023. Verified name and  with patient as identifiers. Patient: Delwin Najjar  Patient : 1945   MRN: [id number]  Reason for Admission: abdominal pain, n/v/d  Discharge Date: 23 RARS: Readmission Risk Score: 12.9      Needs to be reviewed by the provider   Additional needs identified to be addressed with provider: No  none             Method of communication with provider: none. Addressed changes since last contact:  none  Discussed follow-up appointments. If no appointment was previously scheduled, appointment scheduling offered: Yes. Is follow up appointment scheduled within 7 days of discharge?  No.    Follow Up  Future Appointments   Date Time Provider Department Center   2023 To Be Determined Ron Levels [address]   2023  9:00 AM Aida Cuadra MD [facility]   2023  2:45 PM JACLYN De Leon - NP [facility]   2023 12:00 PM Drew Hernandez RN [address]   2023 To Be Determined Jayme Galvan RN [address]   2023 To Be Determined Ron Levels [address]   2023 To Be Determined Jayme Galvan RN [address]   [facility]  1:30 [facility]   2023  2:00 PM Unique Martinez MD [facility]   2023 To Be Determined Ariana Cordero, PT [address]   2023 To Be Determined Jayme Galvan RN [address]   2023 To Be Determined Jayme Galvan RN [address]   2023 To Be Determined Jayme Galvan RN [address]   2024 To Be Determined Jayme Galvan RN [address]   2024 To Be Determined Jayme Galvan RN [address]   2024 To Be Determined Sandifer, Amy, RN [address] [facility] [address]

## 2023-11-29 ENCOUNTER — OFFICE VISIT (OUTPATIENT)
Dept: INTERNAL MEDICINE CLINIC | Facility: CLINIC | Age: 78
End: 2023-11-29

## 2023-11-29 ENCOUNTER — OFFICE VISIT (OUTPATIENT)
Dept: SURGERY | Age: 78
End: 2023-11-29

## 2023-11-29 VITALS
HEART RATE: 75 BPM | BODY MASS INDEX: 20.41 KG/M2 | WEIGHT: 111.6 LBS | SYSTOLIC BLOOD PRESSURE: 118 MMHG | OXYGEN SATURATION: 99 % | DIASTOLIC BLOOD PRESSURE: 72 MMHG

## 2023-11-29 DIAGNOSIS — Z09 POSTOPERATIVE EXAMINATION: Primary | ICD-10-CM

## 2023-11-29 DIAGNOSIS — Z09 HOSPITAL DISCHARGE FOLLOW-UP: Primary | ICD-10-CM

## 2023-11-29 DIAGNOSIS — E03.9 HYPOTHYROIDISM, UNSPECIFIED TYPE: ICD-10-CM

## 2023-11-29 PROCEDURE — 99024 POSTOP FOLLOW-UP VISIT: CPT | Performed by: NURSE PRACTITIONER

## 2023-11-29 RX ORDER — LEVOTHYROXINE SODIUM 0.05 MG/1
50 TABLET ORAL DAILY
Qty: 90 TABLET | Refills: 1 | Status: SHIPPED | OUTPATIENT
Start: 2023-11-29

## 2023-11-29 ASSESSMENT — ENCOUNTER SYMPTOMS
GASTROINTESTINAL NEGATIVE: 1
RESPIRATORY NEGATIVE: 1

## 2023-11-29 NOTE — PROGRESS NOTES
FOLLOW UP VISIT    Subjective:    Mauricio Stanton (: 1945) is a 70 y.o., female,   Chief Complaint   Patient presents with   [address] from Hospital       HPI:  HPI      Very nice patient in for hospital follow up  Date of Admission: 11/15/2023  Date of Discharge : 23    Very nice 70year old in for follow up at the hospital.  She had acute abdominal pain and had appendicitis. She had surgery and a CT scan revealed a most likely malignant neoplasm of the cecum. She is going to Heme/Onc on Dec 11th . She did have issues in the hospital and was sent to ICU for acute hypoxic resp failure  which resolved . Her procalcition was high as it was APR. On admission WBC was 18.2 and Hb was 9       CT findings:   DIAGNOSIS   A:  \" ILEOCECAL MASS\":  ADENOCARCINOMA, MODERATELY DIFFERENTIATED GROSSLY MEASURING   APPROXIMATELY 6.5 X 3.4 X 3.3 CM. TUMOR INFILTRATES THROUGH MUSCULARIS   PROPRIA INTO PERICOLONIC ADIPOSE TISSUE. METASTATIC CARCINOMA TO 11 OF 20   PERICOLONIC LYMPH NODES WITH EXTRACAPSULAR EXTENSION. DEFINITE   LYMPHOVASCULAR AND PERINEURAL INVASION ARE NOT IDENTIFIED. SEE COMMENT          B:  \"ADDITIONAL ILEUM\":  SEGMENT OF SMALL INTESTINE HAVING FIBROUS SEROSAL ADHESIONS NEGATIVE FOR MALIGNANT TUMOR     Comment:  Special studies for mutations of mismatch repair proteins and extended Eduardo   will be performed and results will follow under separate cover. Sign Out Date: 2023  LAKSHMI Hilliard M.D.                                 11/15/23 CT abd/pelvis with IV contrast  EXAMINATION: CT  ABDOMEN / PELVIS   11/15/2023 1:40 PM      LOWER THORAX: Subsegmental atelectasis and/or scarring in the lung bases. LIVER: There is minimal periportal edema. New 1.4 cm hypoattenuating area in the left hepatic lobe. BILIARY: Gallbladder is unremarkable. No intrahepatic or extrahepatic biliary ductal dilation. SPLEEN: No splenomegaly or concerning lesion.   PANCREAS: No pancreatic duct

## 2023-11-29 NOTE — PATIENT INSTRUCTIONS
-Follow up in one week to have remaining staples removed with MOA/Nurse   -Follow up with Dr. Abraham Mg in January 2024 or sooner if needed (Call office to schedule appointment)  -Follow-up with your heme/oncology physician Dr Mir Sweeney December 11 (at your already scheduled appt) for iron deficiency anemia and metastatic colon carcinoma  -Follow-up with GI in early March to consider colonoscopy  -No heavy lifting (>5lbs) for 6 weeks to reduce risk of developing a hernia in the incisions.  -Diet as tolerated   -Continue all medications currently prescribed and discuss with Dr. Mir Sweeney and PCP duration of Xarelto treatment

## 2023-12-01 ENCOUNTER — HOME CARE VISIT (OUTPATIENT)
Dept: SCHEDULING | Facility: HOME HEALTH | Age: 78
End: 2023-12-01
Payer: MEDICARE

## 2023-12-01 DIAGNOSIS — D64.9 ANEMIA, UNSPECIFIED TYPE: ICD-10-CM

## 2023-12-01 DIAGNOSIS — I26.99 PULMONARY EMBOLISM, BILATERAL (HCC): Primary | ICD-10-CM

## 2023-12-01 DIAGNOSIS — E55.9 VITAMIN D DEFICIENCY: ICD-10-CM

## 2023-12-01 PROCEDURE — G0299 HHS/HOSPICE OF RN EA 15 MIN: HCPCS

## 2023-12-02 VITALS
DIASTOLIC BLOOD PRESSURE: 84 MMHG | SYSTOLIC BLOOD PRESSURE: 114 MMHG | OXYGEN SATURATION: 100 % | HEART RATE: 72 BPM | TEMPERATURE: 98.1 F | RESPIRATION RATE: 18 BRPM

## 2023-12-02 VITALS
OXYGEN SATURATION: 100 % | HEART RATE: 78 BPM | RESPIRATION RATE: 16 BRPM | DIASTOLIC BLOOD PRESSURE: 64 MMHG | SYSTOLIC BLOOD PRESSURE: 108 MMHG | TEMPERATURE: 98.1 F

## 2023-12-04 ENCOUNTER — HOME CARE VISIT (OUTPATIENT)
Dept: HOME HEALTH SERVICES | Facility: HOME HEALTH | Age: 78
End: 2023-12-04
Payer: MEDICARE

## 2023-12-05 ENCOUNTER — CARE COORDINATION (OUTPATIENT)
Dept: CARE COORDINATION | Facility: CLINIC | Age: 78
End: 2023-12-05

## 2023-12-05 ENCOUNTER — TELEMEDICINE (OUTPATIENT)
Dept: PRIMARY CARE CLINIC | Facility: CLINIC | Age: 78
End: 2023-12-05
Payer: MEDICARE

## 2023-12-05 ENCOUNTER — HOME CARE VISIT (OUTPATIENT)
Dept: SCHEDULING | Facility: HOME HEALTH | Age: 78
End: 2023-12-05
Payer: MEDICARE

## 2023-12-05 DIAGNOSIS — M54.50 LOW BACK PAIN WITHOUT SCIATICA, UNSPECIFIED BACK PAIN LATERALITY, UNSPECIFIED CHRONICITY: Primary | ICD-10-CM

## 2023-12-05 PROCEDURE — G8427 DOCREV CUR MEDS BY ELIG CLIN: HCPCS | Performed by: INTERNAL MEDICINE

## 2023-12-05 PROCEDURE — G8399 PT W/DXA RESULTS DOCUMENT: HCPCS | Performed by: INTERNAL MEDICINE

## 2023-12-05 PROCEDURE — 1036F TOBACCO NON-USER: CPT | Performed by: INTERNAL MEDICINE

## 2023-12-05 PROCEDURE — 99214 OFFICE O/P EST MOD 30 MIN: CPT | Performed by: INTERNAL MEDICINE

## 2023-12-05 PROCEDURE — G8420 CALC BMI NORM PARAMETERS: HCPCS | Performed by: INTERNAL MEDICINE

## 2023-12-05 PROCEDURE — 1090F PRES/ABSN URINE INCON ASSESS: CPT | Performed by: INTERNAL MEDICINE

## 2023-12-05 PROCEDURE — G8484 FLU IMMUNIZE NO ADMIN: HCPCS | Performed by: INTERNAL MEDICINE

## 2023-12-05 PROCEDURE — 1111F DSCHRG MED/CURRENT MED MERGE: CPT | Performed by: INTERNAL MEDICINE

## 2023-12-05 PROCEDURE — 1123F ACP DISCUSS/DSCN MKR DOCD: CPT | Performed by: INTERNAL MEDICINE

## 2023-12-05 RX ORDER — BACLOFEN 10 MG/1
10 TABLET ORAL NIGHTLY PRN
Qty: 15 TABLET | Refills: 0 | Status: SHIPPED | OUTPATIENT
Start: 2023-12-05

## 2023-12-05 ASSESSMENT — PATIENT HEALTH QUESTIONNAIRE - PHQ9
2. FEELING DOWN, DEPRESSED OR HOPELESS: 0
SUM OF ALL RESPONSES TO PHQ QUESTIONS 1-9: 0
SUM OF ALL RESPONSES TO PHQ QUESTIONS 1-9: 0
SUM OF ALL RESPONSES TO PHQ9 QUESTIONS 1 & 2: 0
SUM OF ALL RESPONSES TO PHQ QUESTIONS 1-9: 0
SUM OF ALL RESPONSES TO PHQ QUESTIONS 1-9: 0
1. LITTLE INTEREST OR PLEASURE IN DOING THINGS: 0

## 2023-12-05 ASSESSMENT — ENCOUNTER SYMPTOMS
ABDOMINAL PAIN: 0
BACK PAIN: 1

## 2023-12-05 NOTE — CARE COORDINATION
understanding of plan of care after discharge. Discussed appropriate site of care based on symptoms and resources available to patient including: PCP  Specialist  Urgent care clinics  Home health  When to call Carlotta Leon. The patient agrees to contact the PCP office for questions related to their healthcare. Advance Care Planning:   reviewed and current. Patients top risk factors for readmission: medical condition-Bilateral PE, Colon CA, Bilateral Pleural Effusion, Anemia  Interventions to address risk factors: Obtained and reviewed discharge summary and/or continuity of care documents, Communication with home health agencies or other community services the patient is currently using-[facility] [phone], Education of patient/family/caregiver/guardian to support self-management-Brittny Sanderson LPN [address] [place], and all scheduled appointments. Offered patient enrollment in the Remote Patient Monitoring (RPM) program for in-home monitoring:  na .     Care Transitions Subsequent and Final Call    Subsequent and Final Calls  Do you have any ongoing symptoms?: No  Have your medications changed?: No  Do you have any questions related to your medications?: No  Do you currently have any active services?: Yes  Are you currently active with any services?: Home Health  Do you have any needs or concerns that I can assist you with?: No  Identified Barriers: None  Care Transitions Interventions                          Other Interventions:             LPN Care Coordinator provided contact information for future needs. Plan for follow-up call in 7-10 days based on severity of symptoms and risk factors. Plan for next call: self management-diet, hydration, exercise, follow up appointments.      Lisa Alejandra LPN

## 2023-12-05 NOTE — PROGRESS NOTES
provider was licensed to provide care. An electronic signature was used to authenticate this note. The patient and/or patient representative voiced understanding and agreement with the current diagnoses, recommendations, and possible side effects. No follow-up provider specified.       Jose A Crawford MD

## 2023-12-06 ENCOUNTER — NURSE ONLY (OUTPATIENT)
Dept: SURGERY | Age: 78
End: 2023-12-06

## 2023-12-06 DIAGNOSIS — Z09 POSTOPERATIVE EXAMINATION: Primary | ICD-10-CM

## 2023-12-06 NOTE — PROGRESS NOTES
Patient presents for a nurse visit s/p 11/15/2023 -lap converted to open ileo cystectomy w/ appendectomy. Patient needs remaining staples removed. Patient denies any issues with incision, fever, chills, N&V. This CMA removed all remaining staples. Patient tolerated procedure well.

## 2023-12-07 ENCOUNTER — HOSPITAL ENCOUNTER (OUTPATIENT)
Dept: GENERAL RADIOLOGY | Age: 78
Discharge: HOME | End: 2023-12-10

## 2023-12-07 ENCOUNTER — HOME CARE VISIT (OUTPATIENT)
Dept: SCHEDULING | Facility: HOME HEALTH | Age: 78
End: 2023-12-07
Payer: MEDICARE

## 2023-12-07 VITALS
DIASTOLIC BLOOD PRESSURE: 78 MMHG | HEART RATE: 68 BPM | SYSTOLIC BLOOD PRESSURE: 118 MMHG | OXYGEN SATURATION: 100 % | RESPIRATION RATE: 16 BRPM | TEMPERATURE: 97.8 F

## 2023-12-07 PROCEDURE — G0299 HHS/HOSPICE OF RN EA 15 MIN: HCPCS

## 2023-12-08 DIAGNOSIS — E03.9 HYPOTHYROIDISM, UNSPECIFIED TYPE: ICD-10-CM

## 2023-12-08 RX ORDER — LEVOTHYROXINE SODIUM 0.05 MG/1
50 TABLET ORAL DAILY
Qty: 90 TABLET | Refills: 1 | Status: SHIPPED | OUTPATIENT
Start: 2023-12-08

## 2023-12-11 ENCOUNTER — HOSPITAL ENCOUNTER (OUTPATIENT)
Dept: LAB | Age: 78
Discharge: HOME | End: 2023-12-14
Payer: MEDICARE

## 2023-12-11 DIAGNOSIS — C18.2 MALIGNANT NEOPLASM OF ASCENDING COLON (HCC): ICD-10-CM

## 2023-12-11 DIAGNOSIS — I26.99 PULMONARY EMBOLISM, BILATERAL (HCC): ICD-10-CM

## 2023-12-11 DIAGNOSIS — E55.9 VITAMIN D DEFICIENCY: ICD-10-CM

## 2023-12-11 DIAGNOSIS — D64.9 ANEMIA, UNSPECIFIED TYPE: ICD-10-CM

## 2023-12-11 LAB
25(OH)D3 SERPL-MCNC: 27.3 NG/ML (ref 30–100)
ALBUMIN SERPL-MCNC: 3.4 G/DL (ref 3.2–4.6)
ALBUMIN/GLOB SERPL: 1 (ref 0.4–1.6)
ALP SERPL-CCNC: 94 U/L (ref 50–136)
ALT SERPL-CCNC: 19 U/L (ref 12–65)
ANION GAP SERPL CALC-SCNC: 6 MMOL/L (ref 2–11)
AST SERPL-CCNC: 21 U/L (ref 15–37)
BASOPHILS # BLD: 0.1 K/UL (ref 0–0.2)
BASOPHILS NFR BLD: 1 % (ref 0–2)
BILIRUB SERPL-MCNC: 0.2 MG/DL (ref 0.2–1.1)
BUN SERPL-MCNC: 6 MG/DL (ref 8–23)
CALCIUM SERPL-MCNC: 9.3 MG/DL (ref 8.3–10.4)
CEA SERPL-MCNC: 2.5 NG/ML (ref 0–3)
CHLORIDE SERPL-SCNC: 109 MMOL/L (ref 103–113)
CO2 SERPL-SCNC: 26 MMOL/L (ref 21–32)
CREAT SERPL-MCNC: 0.7 MG/DL (ref 0.6–1)
DIFFERENTIAL METHOD BLD: ABNORMAL
EOSINOPHIL # BLD: 0.2 K/UL (ref 0–0.8)
EOSINOPHIL NFR BLD: 3 % (ref 0.5–7.8)
ERYTHROCYTE [DISTWIDTH] IN BLOOD BY AUTOMATED COUNT: 14.6 % (ref 11.9–14.6)
FERRITIN SERPL-MCNC: 44 NG/ML (ref 8–388)
GLOBULIN SER CALC-MCNC: 3.5 G/DL (ref 2.8–4.5)
GLUCOSE SERPL-MCNC: 108 MG/DL (ref 65–100)
HCT VFR BLD AUTO: 34.4 % (ref 35.8–46.3)
HGB BLD-MCNC: 10.4 G/DL (ref 11.7–15.4)
IMM GRANULOCYTES # BLD AUTO: 0 K/UL (ref 0–0.5)
IMM GRANULOCYTES NFR BLD AUTO: 0 % (ref 0–5)
IRON SATN MFR SERPL: 61 %
IRON SERPL-MCNC: 175 UG/DL (ref 35–150)
LYMPHOCYTES # BLD: 2.4 K/UL (ref 0.5–4.6)
LYMPHOCYTES NFR BLD: 32 % (ref 13–44)
MCH RBC QN AUTO: 28.1 PG (ref 26.1–32.9)
MCHC RBC AUTO-ENTMCNC: 30.2 G/DL (ref 31.4–35)
MCV RBC AUTO: 93 FL (ref 82–102)
MONOCYTES # BLD: 0.6 K/UL (ref 0.1–1.3)
MONOCYTES NFR BLD: 8 % (ref 4–12)
NEUTS SEG # BLD: 4.1 K/UL (ref 1.7–8.2)
NEUTS SEG NFR BLD: 56 % (ref 43–78)
NRBC # BLD: 0 K/UL (ref 0–0.2)
PLATELET # BLD AUTO: 344 K/UL (ref 150–450)
PMV BLD AUTO: 10.9 FL (ref 9.4–12.3)
POTASSIUM SERPL-SCNC: 3.3 MMOL/L (ref 3.5–5.1)
PROT SERPL-MCNC: 6.9 G/DL (ref 6.3–8.2)
RBC # BLD AUTO: 3.7 M/UL (ref 4.05–5.2)
SODIUM SERPL-SCNC: 141 MMOL/L (ref 136–146)
TIBC SERPL-MCNC: 286 UG/DL (ref 250–450)
WBC # BLD AUTO: 7.4 K/UL (ref 4.3–11.1)

## 2023-12-11 PROCEDURE — 83550 IRON BINDING TEST: CPT

## 2023-12-11 PROCEDURE — 82728 ASSAY OF FERRITIN: CPT

## 2023-12-11 PROCEDURE — 83540 ASSAY OF IRON: CPT

## 2023-12-11 PROCEDURE — 82378 CARCINOEMBRYONIC ANTIGEN: CPT

## 2023-12-11 PROCEDURE — 80053 COMPREHEN METABOLIC PANEL: CPT

## 2023-12-11 PROCEDURE — 36415 COLL VENOUS BLD VENIPUNCTURE: CPT

## 2023-12-11 PROCEDURE — 82306 VITAMIN D 25 HYDROXY: CPT

## 2023-12-11 PROCEDURE — 85025 COMPLETE CBC W/AUTO DIFF WBC: CPT

## 2023-12-12 ENCOUNTER — CLINICAL DOCUMENTATION (OUTPATIENT)
Dept: ONCOLOGY | Age: 78
End: 2023-12-12

## 2023-12-12 ENCOUNTER — APPOINTMENT (RX ONLY)
Dept: URBAN - METROPOLITAN AREA CLINIC 329 | Facility: CLINIC | Age: 78
Setting detail: DERMATOLOGY
End: 2023-12-12

## 2023-12-12 DIAGNOSIS — D22 MELANOCYTIC NEVI: ICD-10-CM

## 2023-12-12 DIAGNOSIS — L82.1 OTHER SEBORRHEIC KERATOSIS: ICD-10-CM

## 2023-12-12 DIAGNOSIS — L57.0 ACTINIC KERATOSIS: ICD-10-CM

## 2023-12-12 DIAGNOSIS — L81.4 OTHER MELANIN HYPERPIGMENTATION: ICD-10-CM

## 2023-12-12 DIAGNOSIS — D18.0 HEMANGIOMA: ICD-10-CM

## 2023-12-12 PROBLEM — D22.5 MELANOCYTIC NEVI OF TRUNK: Status: ACTIVE | Noted: 2023-12-12

## 2023-12-12 PROBLEM — D18.01 HEMANGIOMA OF SKIN AND SUBCUTANEOUS TISSUE: Status: ACTIVE | Noted: 2023-12-12

## 2023-12-12 PROCEDURE — 17000 DESTRUCT PREMALG LESION: CPT

## 2023-12-12 PROCEDURE — 99203 OFFICE O/P NEW LOW 30 MIN: CPT | Mod: 25

## 2023-12-12 PROCEDURE — ? LIQUID NITROGEN

## 2023-12-12 PROCEDURE — ? COUNSELING

## 2023-12-12 ASSESSMENT — LOCATION ZONE DERM
LOCATION ZONE: FACE
LOCATION ZONE: TRUNK

## 2023-12-12 ASSESSMENT — LOCATION DETAILED DESCRIPTION DERM
LOCATION DETAILED: LEFT MEDIAL UPPER BACK
LOCATION DETAILED: INFERIOR THORACIC SPINE
LOCATION DETAILED: LEFT INFERIOR UPPER BACK
LOCATION DETAILED: RIGHT MEDIAL UPPER BACK
LOCATION DETAILED: LEFT CENTRAL EYEBROW

## 2023-12-12 ASSESSMENT — LOCATION SIMPLE DESCRIPTION DERM
LOCATION SIMPLE: LEFT EYEBROW
LOCATION SIMPLE: RIGHT UPPER BACK
LOCATION SIMPLE: UPPER BACK
LOCATION SIMPLE: LEFT UPPER BACK

## 2023-12-12 NOTE — PROGRESS NOTES
Spoke with pat and made sure she would be arriving for chemo education at 3pm on 1-2-24. I introduced myself to pt and my role as a nurse navigator. Pt states she will call me back when she decides which chemo plan she will choose.

## 2023-12-13 ENCOUNTER — CARE COORDINATION (OUTPATIENT)
Dept: CARE COORDINATION | Facility: CLINIC | Age: 78
End: 2023-12-13

## 2023-12-13 ENCOUNTER — HOME CARE VISIT (OUTPATIENT)
Dept: SCHEDULING | Facility: HOME HEALTH | Age: 78
End: 2023-12-13
Payer: MEDICARE

## 2023-12-13 VITALS
DIASTOLIC BLOOD PRESSURE: 64 MMHG | HEART RATE: 64 BPM | OXYGEN SATURATION: 100 % | SYSTOLIC BLOOD PRESSURE: 106 MMHG | RESPIRATION RATE: 18 BRPM | TEMPERATURE: 98.9 F

## 2023-12-13 PROCEDURE — G0299 HHS/HOSPICE OF RN EA 15 MIN: HCPCS

## 2023-12-13 NOTE — CARE COORDINATION
Care Transitions Outreach Attempt    Call within 2 business days of discharge: Yes   Attempted to reach patient for transitions of care follow up. Unable to reach patient. Patient: Ronna Muñiz Patient : 1945 MRN: [id number]    Last Discharge Facility       Date Complaint Diagnosis Description Type Department Provider    11/15/23 Abdominal Pain; Emesis; Diarrhea Acute appendicitis with generalized peritonitis, unspecified whether abscess present, unspecified whether gangrene present, unspecified whether perforation present . .. ED to Hosp-Admission (Discharged) (ADMITTED) Nitin Marcus MD; La Nena Nevarez. .. Was this an external facility discharge? No Discharge Facility Name: [facility]    Noted following upcoming appointments from discharge chart review:   [facility] follow up appointment(s):   Future Appointments   Date Time Provider [address]   2023 To Be Determined Jess Flaherty RN [address]   2023 11:00 AM [address] [place] [address] PET/CT INJ [address]   2023 To Be Determined April Jimenez RN [address]   2024 To Be Determined April Jimenez RN [address]   2024 12:00 PM [facility] FINANCIAL COUNSELOR [facility]   2024  3:00 PM JACLYN Anne CNP [facility]   2024 To Be Determined April Jimenez RN [address]   1/10/2024  8:00 AM PERIPHERAL [facility] [address] [place] [address]   1/10/2024  9:00 AM JACLYN Solo CNP [facility]   1/15/2024 11:15 AM Duane Bland, MD [facility] AM[facility]   2024 To Be Determined April Jimenez RN [address]   2024  9:30 AM Hollis Tracey MD [facility] AM[facility]   2024  9:30 AM Duane Bland, MD [facility] AMB     [facility]  follow up appointment(s): na    RADHA outreach follow up call. Left message, identified self, reason for call, return call contact information, Yue Matthews LPN [address].

## 2023-12-18 ENCOUNTER — TELEPHONE (OUTPATIENT)
Dept: ONCOLOGY | Age: 78
End: 2023-12-18

## 2023-12-18 NOTE — TELEPHONE ENCOUNTER
Physician provider: Tesha Johnson MD  Reason for today's call: OV note request  Last office visit: n/a     Jasmin brady/[facility] Auth Dept called to F/U on requested OV note to close out PET Scan order. She can be reached at: [phone].

## 2023-12-19 PROBLEM — Z71.89 GOALS OF CARE, COUNSELING/DISCUSSION: Status: ACTIVE | Noted: 2023-12-19

## 2023-12-19 PROBLEM — Z86.711 HISTORY OF PULMONARY EMBOLISM: Status: ACTIVE | Noted: 2023-12-19

## 2023-12-19 PROBLEM — Z79.01 ANTICOAGULATED: Status: ACTIVE | Noted: 2023-12-19

## 2023-12-21 ENCOUNTER — HOSPITAL ENCOUNTER (OUTPATIENT)
Dept: PET IMAGING | Age: 78
Discharge: HOME | End: 2023-12-24
Payer: MEDICARE

## 2023-12-21 DIAGNOSIS — C18.9 PRIMARY COLON CANCER WITH METASTASIS TO 7 OR MORE REGIONAL LYMPH NODES (N2B) (HCC): ICD-10-CM

## 2023-12-21 DIAGNOSIS — C18.2 MALIGNANT NEOPLASM OF ASCENDING COLON (HCC): ICD-10-CM

## 2023-12-21 DIAGNOSIS — C77.2 PRIMARY COLON CANCER WITH METASTASIS TO 7 OR MORE REGIONAL LYMPH NODES (N2B) (HCC): ICD-10-CM

## 2023-12-21 LAB
GLUCOSE BLD STRIP.AUTO-MCNC: 95 MG/DL (ref 65–100)
SERVICE CMNT-IMP: NORMAL

## 2023-12-21 PROCEDURE — 6360000004 HC RX CONTRAST MEDICATION: Performed by: INTERNAL MEDICINE

## 2023-12-21 PROCEDURE — 2580000003 HC RX 258: Performed by: INTERNAL MEDICINE

## 2023-12-21 PROCEDURE — 3430000000 HC RX DIAGNOSTIC RADIOPHARMACEUTICAL: Performed by: INTERNAL MEDICINE

## 2023-12-21 PROCEDURE — 82962 GLUCOSE BLOOD TEST: CPT

## 2023-12-21 PROCEDURE — A9552 F18 FDG: HCPCS | Performed by: INTERNAL MEDICINE

## 2023-12-21 PROCEDURE — 78815 PET IMAGE W/CT SKULL-THIGH: CPT

## 2023-12-21 RX ORDER — FLUDEOXYGLUCOSE F 18 200 MCI/ML
11.98 INJECTION, SOLUTION INTRAVENOUS
Status: COMPLETED | OUTPATIENT
Start: 2023-12-21 | End: 2023-12-21

## 2023-12-21 RX ORDER — SODIUM CHLORIDE 0.9 % (FLUSH) 0.9 %
20 SYRINGE (ML) INJECTION AS NEEDED
Status: DISCONTINUED | OUTPATIENT
Start: 2023-12-21 | End: 2023-12-25 | Stop reason: HOSPADM

## 2023-12-21 RX ADMIN — SODIUM CHLORIDE, PRESERVATIVE FREE 20 ML: 5 INJECTION INTRAVENOUS at 10:50

## 2023-12-21 RX ADMIN — FLUDEOXYGLUCOSE F 18 11.98 MILLICURIE: 200 INJECTION, SOLUTION INTRAVENOUS at 10:50

## 2023-12-21 RX ADMIN — DIATRIZOATE MEGLUMINE AND DIATRIZOATE SODIUM 10 ML: 660; 100 LIQUID ORAL; RECTAL at 10:50

## 2023-12-29 ENCOUNTER — CLINICAL DOCUMENTATION (OUTPATIENT)
Facility: HOSPITAL | Age: 78
End: 2023-12-29

## 2023-12-29 NOTE — PROGRESS NOTES
Called patient in regards to financial counseling Tuesday 1/2/2024. I left a vm stating she could come in at 3pm for her chemo ed instead of 12. She currently does not have a treatment plan in and I would like to wait until that is determined before discussing ways to help financially. I will follow up with patient.

## 2024-01-01 ENCOUNTER — TELEPHONE (OUTPATIENT)
Dept: ONCOLOGY | Age: 79
End: 2024-01-01

## 2024-01-01 DIAGNOSIS — G47.00 INSOMNIA, UNSPECIFIED TYPE: ICD-10-CM

## 2024-01-01 RX ORDER — TEMAZEPAM 15 MG/1
15 CAPSULE ORAL NIGHTLY PRN
Qty: 10 CAPSULE | Refills: 0 | Status: CANCELLED | OUTPATIENT
Start: 2024-01-01 | End: 2025-01-01

## 2024-01-02 ENCOUNTER — CLINICAL DOCUMENTATION (OUTPATIENT)
Dept: ONCOLOGY | Age: 79
End: 2024-01-02

## 2024-01-02 NOTE — PROGRESS NOTES
Called pt on 12-27-23 and asked if she had decided on the chemotherapy regimens she may want offered by Dr Johnson in the office visit. Pt stated she wanted to know the results of the PET scan prior to making any decisions.    Called today and spoke with patient's daughter concerning 3pm chemotherapy education appt. Seeing pt desires to know results of PET  will cancel chemo ed for today and reschedule until 1-10-24. Dr Johnson will do 3:45pm VV to review PET and plan of care.    Patient's daughter is in agreement w plan.

## 2024-01-03 ENCOUNTER — TELEMEDICINE (OUTPATIENT)
Dept: ONCOLOGY | Age: 79
End: 2024-01-03
Payer: MEDICARE

## 2024-01-03 DIAGNOSIS — C18.2 MALIGNANT NEOPLASM OF ASCENDING COLON (HCC): Primary | ICD-10-CM

## 2024-01-03 DIAGNOSIS — C18.9 PRIMARY COLON CANCER WITH METASTASIS TO 7 OR MORE REGIONAL LYMPH NODES (N2B) (HCC): ICD-10-CM

## 2024-01-03 DIAGNOSIS — C77.2 PRIMARY COLON CANCER WITH METASTASIS TO 7 OR MORE REGIONAL LYMPH NODES (N2B) (HCC): ICD-10-CM

## 2024-01-03 PROCEDURE — 1123F ACP DISCUSS/DSCN MKR DOCD: CPT | Performed by: INTERNAL MEDICINE

## 2024-01-03 PROCEDURE — 99214 OFFICE O/P EST MOD 30 MIN: CPT | Performed by: INTERNAL MEDICINE

## 2024-01-03 ASSESSMENT — ENCOUNTER SYMPTOMS
DIARRHEA: 0
SCLERAL ICTERUS: 0
TROUBLE SWALLOWING: 0
NAUSEA: 0
HEMOPTYSIS: 0
VOICE CHANGE: 0
SHORTNESS OF BREATH: 0
SORE THROAT: 0
WHEEZING: 0
VOMITING: 0
CONSTIPATION: 0
CHEST TIGHTNESS: 0
BLOOD IN STOOL: 0
ABDOMINAL PAIN: 0
ABDOMINAL DISTENTION: 0

## 2024-01-03 NOTE — PROGRESS NOTES
Patient has requested an audio/video evaluation for the following concern(s):    Pt was evaluated through a synchronous (real-time) audio-video encounter. The patient (or guardian if applicable) is aware that this is a billable service, which includes applicable co-pays. This Virtual Visit was conducted with patient's (and/or legal guardian's) consent. The visit was conducted pursuant to the emergency declaration under the Ye Act and the National Emergencies Act, 1135 waiver authority and the Coronavirus Preparedness and Response Supplemental Appropriations Act.  Patient identification was verified, and a caregiver was present when appropriate.   The patient was located at Other: home  Provider was located at Facility (Appt Dept): [address] [place] [address]   [address] [address].     Total time spent on this encounter: 30min [chart review, imaging review, VV discussion and charting]    [facility]: Established patient - follow up     Chief Complaint   Patient presents with    Follow-up     New diagnosis of colon adenocarcinoma     Referral Diagnosis: Other acute pulmonary embolism without acute cor pulmonale; Anemia, unspecified type  Referring Provider: Jade Dyer MD  Family History of Cancer/ Hematology Disorders: Brother with unspecified type of cancer  Presenting Symptoms: Shortness of breath and heart palpitations    History of Present Illness:  Ms. Hui is a 78 y.o. female who presents today for follow up regarding new dx of adenocarcinoma and hx of PE.  The past medical history is per below.    - At consultation, she was referred for recent acute pulmonary embolism after surgery without cor pulmonale and also on Xarelto, with anemia.  She was traveling to [place] and per Dr. Dyer, CT was scheduled prior to her trip.  Needed Xarelto refilled.  Due to patient's history of chronic anemia with hemoglobin ranging from 9-9.8 all her life per patient, proceeded w workup to

## 2024-01-03 NOTE — PATIENT INSTRUCTIONS
Patient Instructions from Today's Visit    Reason for Visit:  Follow Up    Diagnosis Information:  https://www.cancer.net/about-us/asco-answers-patient-education-materials/manq-stfgmcy-tztm-sheets    Plan:  Labs reviewed.  Symptoms reviewed.  You have elected to forgo chemotherapy at this time and proceed with surveillance.  If you change your mind, please let us know.  Discussed option of MRI of liver.  You can call to schedule this at [phone] if you wish to proceed.  Recommend CT of chest, abdomen, and pelvis in March and PET scan in June.  You can call to schedule this at [phone].    Follow Up:  March after CT scan.    Recent Lab Results:  N/A    Treatment Summary has been discussed and given to patient: N/A        -------------------------------------------------------------------------------------------------------------------  Please call our office at [phone] if you have any  of the following symptoms:   Fever of 100.5 or greater  Chills  Shortness of breath  Swelling or pain in one leg    After office hours an answering service is available and will contact a provider for emergencies or if you are experiencing any of the above symptoms.    Patient did express an interest in My Chart.  My Chart log in information explained on the after visit summary printout at the check-out desk.    BETTY YNE RN

## 2024-01-04 ENCOUNTER — PATIENT MESSAGE (OUTPATIENT)
Dept: PRIMARY CARE CLINIC | Facility: CLINIC | Age: 79
End: 2024-01-04

## 2024-01-04 DIAGNOSIS — Z12.11 SCREEN FOR COLON CANCER: Primary | ICD-10-CM

## 2024-01-04 NOTE — TELEPHONE ENCOUNTER
From: Haven Rothman  To: Dr. Jade Pillai  Sent: 1/4/2024 9:23 AM EST  Subject: Colonoscopy    Good morning,  Please let me know the GI doctor you recommended before I had my surgery. I would like to schedule a colonoscopy for March.  Also, I have an appointment with Dr. Pillai for 2/12 but now realize it is overlapping an appointment with Dr. Hernandez that was already scheduled. Could we change it to Feb 13 ? I see there is an 11:15 available and this would give me enough time to have labs done in the morning.  Happy New Year,  haven rothman

## 2024-01-10 ENCOUNTER — TELEPHONE (OUTPATIENT)
Dept: ONCOLOGY | Age: 79
End: 2024-01-10

## 2024-01-10 NOTE — TELEPHONE ENCOUNTER
Call to pt re recs of US thyroid and thyroid studies d/t activity seen on PET.  Pt declined and wishes to FU with PCP re this.  She is on synthroid as well.  Pt is scheduled for MRI abdomen tomorrow 1/11.  Pt appreciated the call.

## 2024-01-11 ENCOUNTER — HOSPITAL ENCOUNTER (OUTPATIENT)
Age: 79
Discharge: HOME | End: 2024-01-13
Payer: MEDICARE

## 2024-01-11 DIAGNOSIS — C77.2 PRIMARY COLON CANCER WITH METASTASIS TO 7 OR MORE REGIONAL LYMPH NODES (N2B) (HCC): ICD-10-CM

## 2024-01-11 DIAGNOSIS — C18.2 MALIGNANT NEOPLASM OF ASCENDING COLON (HCC): ICD-10-CM

## 2024-01-11 DIAGNOSIS — C18.9 PRIMARY COLON CANCER WITH METASTASIS TO 7 OR MORE REGIONAL LYMPH NODES (N2B) (HCC): ICD-10-CM

## 2024-01-11 PROCEDURE — 6360000004 HC RX CONTRAST MEDICATION: Performed by: INTERNAL MEDICINE

## 2024-01-11 PROCEDURE — A9579 GAD-BASE MR CONTRAST NOS,1ML: HCPCS | Performed by: INTERNAL MEDICINE

## 2024-01-11 PROCEDURE — 74183 MRI ABD W/O CNTR FLWD CNTR: CPT

## 2024-01-11 RX ADMIN — GADOTERIDOL 10 ML: 279.3 INJECTION, SOLUTION INTRAVENOUS at 10:33

## 2024-02-12 ENCOUNTER — OFFICE VISIT (OUTPATIENT)
Dept: SURGERY | Age: 79
End: 2024-02-12

## 2024-02-12 VITALS
WEIGHT: 112 LBS | OXYGEN SATURATION: 98 % | SYSTOLIC BLOOD PRESSURE: 128 MMHG | HEART RATE: 58 BPM | BODY MASS INDEX: 20.61 KG/M2 | HEIGHT: 62 IN | DIASTOLIC BLOOD PRESSURE: 78 MMHG

## 2024-02-12 DIAGNOSIS — C18.2 MALIGNANT NEOPLASM OF ASCENDING COLON (HCC): Primary | ICD-10-CM

## 2024-02-12 DIAGNOSIS — R93.2 ABNORMAL LIVER CT: ICD-10-CM

## 2024-02-12 DIAGNOSIS — C77.2 PRIMARY COLON CANCER WITH METASTASIS TO 7 OR MORE REGIONAL LYMPH NODES (N2B) (HCC): ICD-10-CM

## 2024-02-12 DIAGNOSIS — C18.9 PRIMARY COLON CANCER WITH METASTASIS TO 7 OR MORE REGIONAL LYMPH NODES (N2B) (HCC): ICD-10-CM

## 2024-02-12 PROCEDURE — 99024 POSTOP FOLLOW-UP VISIT: CPT | Performed by: SURGERY

## 2024-02-12 NOTE — PROGRESS NOTES
Progress Note   Haven Hui  MRN: [id number]  :1945  Age:79 y.o.    HPI: Haven Hui is a 79 y.o. 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      DIAGNOSIS   A:  \" ILEOCECAL MASS\":  ADENOCARCINOMA, MODERATELY DIFFERENTIATED GROSSLY MEASURING   APPROXIMATELY 6.5 X 3.4 X 3.3 CM. TUMOR INFILTRATES THROUGH MUSCULARIS   PROPRIA INTO PERICOLONIC ADIPOSE TISSUE. METASTATIC CARCINOMA TO 11 OF 20   PERICOLONIC LYMPH NODES WITH EXTRACAPSULAR EXTENSION. DEFINITE   LYMPHOVASCULAR AND PERINEURAL INVASION ARE NOT IDENTIFIED. SEE COMMENT          B:  \"ADDITIONAL ILEUM\":  SEGMENT OF SMALL INTESTINE HAVING FIBROUS SEROSAL ADHESIONS NEGATIVE FOR MALIGNANT TUMOR     Comment:  Special studies for mutations of mismatch repair proteins and extended Eduardo will be performed and results will follow under separate cover.   Sign Out Date: 2023  LAKSHMI Jonas Jr., M.D.           Prior to surgery she originally came to the ER on 11/15/23 complaining of progressive lower abd pain for 1-2months   which became severe and constant for 3 days prior to coming to the ER.    WBC 18.2k; Hgb 9.0  BUN/Cr 20/0.88  LFTs/Lipase  normal   Lactic acid 1.4      She is s/p laparoscopic RIH with 3D mesh mesh and 5mm ProTAck device 23 by Dr Ricks  She had post-op bilateral PEs diagnosed on CT on 23 and treated with Xarelto daily    She had the below CT suspicious for acute appendicitis with significant RLQ inflammatory changes involving multiple bowel surfaces   Gen surgery was consulted           11/15/23 CT abd/pelvis with IV contrast      LOWER THORAX: Subsegmental atelectasis and/or scarring in the lung bases.     LIVER: There is minimal periportal edema. New 1.4 cm hypoattenuating area in the left hepatic

## 2024-02-14 DIAGNOSIS — E03.9 HYPOTHYROIDISM, UNSPECIFIED TYPE: ICD-10-CM

## 2024-02-14 LAB
T4 FREE SERPL-MCNC: 1.1 NG/DL (ref 0.78–1.46)
TSH, 3RD GENERATION: 0.68 UIU/ML (ref 0.36–3.74)

## 2024-02-15 ENCOUNTER — OFFICE VISIT (OUTPATIENT)
Dept: PRIMARY CARE CLINIC | Facility: CLINIC | Age: 79
End: 2024-02-15
Payer: MEDICARE

## 2024-02-15 VITALS
OXYGEN SATURATION: 98 % | HEIGHT: 62 IN | SYSTOLIC BLOOD PRESSURE: 120 MMHG | TEMPERATURE: 97.4 F | HEART RATE: 66 BPM | WEIGHT: 113.6 LBS | BODY MASS INDEX: 20.91 KG/M2 | DIASTOLIC BLOOD PRESSURE: 72 MMHG

## 2024-02-15 DIAGNOSIS — D68.9 COAGULOPATHY (HCC): ICD-10-CM

## 2024-02-15 DIAGNOSIS — E03.9 HYPOTHYROIDISM, UNSPECIFIED TYPE: ICD-10-CM

## 2024-02-15 DIAGNOSIS — C18.9 MALIGNANT NEOPLASM OF COLON, UNSPECIFIED PART OF COLON (HCC): ICD-10-CM

## 2024-02-15 DIAGNOSIS — I26.99 OTHER PULMONARY EMBOLISM WITHOUT ACUTE COR PULMONALE, UNSPECIFIED CHRONICITY (HCC): ICD-10-CM

## 2024-02-15 DIAGNOSIS — D50.9 IRON DEFICIENCY ANEMIA, UNSPECIFIED IRON DEFICIENCY ANEMIA TYPE: Primary | ICD-10-CM

## 2024-02-15 DIAGNOSIS — I50.9 HEART FAILURE, UNSPECIFIED HF CHRONICITY, UNSPECIFIED HEART FAILURE TYPE (HCC): ICD-10-CM

## 2024-02-15 PROCEDURE — G8484 FLU IMMUNIZE NO ADMIN: HCPCS | Performed by: INTERNAL MEDICINE

## 2024-02-15 PROCEDURE — 1123F ACP DISCUSS/DSCN MKR DOCD: CPT | Performed by: INTERNAL MEDICINE

## 2024-02-15 PROCEDURE — 1036F TOBACCO NON-USER: CPT | Performed by: INTERNAL MEDICINE

## 2024-02-15 PROCEDURE — G8427 DOCREV CUR MEDS BY ELIG CLIN: HCPCS | Performed by: INTERNAL MEDICINE

## 2024-02-15 PROCEDURE — 1090F PRES/ABSN URINE INCON ASSESS: CPT | Performed by: INTERNAL MEDICINE

## 2024-02-15 PROCEDURE — 99214 OFFICE O/P EST MOD 30 MIN: CPT | Performed by: INTERNAL MEDICINE

## 2024-02-15 PROCEDURE — G8399 PT W/DXA RESULTS DOCUMENT: HCPCS | Performed by: INTERNAL MEDICINE

## 2024-02-15 PROCEDURE — G8420 CALC BMI NORM PARAMETERS: HCPCS | Performed by: INTERNAL MEDICINE

## 2024-02-15 NOTE — PROGRESS NOTES
packs/day for 27.0 years (6.8 ttl pk-yrs)     Types: Cigarettes     Start date: 1970     Quit date: 1997     Years since quittin.1    Smokeless tobacco: Never   Vaping Use    Vaping Use: Never used   Substance and Sexual Activity    Alcohol use: Yes     Alcohol/week: 3.0 standard drinks of alcohol     Types: 3 Glasses of wine per week     Comment: occasional    Drug use: Never    Sexual activity: Not Currently   Other Topics Concern    Not on file   Social History Narrative    Not on file     Social Determinants of Health     Financial Resource Strain: Low Risk  (3/6/2023)    Overall Financial Resource Strain (CARDIA)     Difficulty of Paying Living Expenses: Not very hard   Food Insecurity: Not on file (2023)   Transportation Needs: No Transportation Needs (2023)    OASIS : Transportation     Lack of Transportation (Medical): No     Lack of Transportation (Non-Medical): No     Patient Unable or Declines to Respond: No   Physical Activity: Sufficiently Active (10/19/2023)    Exercise Vital Sign     Days of Exercise per Week: 5 days     Minutes of Exercise per Session: 40 min   Stress: Not on file   Social Connections: Feeling Socially Integrated (2023)    OASIS : Social Isolation     Frequency of experiencing loneliness or isolation: Never   Intimate Partner Violence: Not on file   Housing Stability: Low Risk  (2023)    Housing Stability Vital Sign     Unable to Pay for Housing in the Last Year: No     Number of Places Lived in the Last Year: 1     Unstable Housing in the Last Year: No       Current Outpatient Medications   Medication Sig Dispense Refill    levothyroxine (SYNTHROID) 50 MCG tablet Take 1 tablet by mouth daily 90 tablet 1    baclofen (LIORESAL) 10 MG tablet Take 1 tablet by mouth nightly as needed (back pain) 15 tablet 0    acetaminophen (TYLENOL) 500 MG tablet Take 2 tablets by mouth every 6 hours as needed for Pain      rivaroxaban (XARELTO) 20 MG TABS

## 2024-02-16 LAB — THYROPEROXIDASE AB SERPL-ACNC: >600 IU/ML (ref 0–34)

## 2024-02-19 ENCOUNTER — PATIENT MESSAGE (OUTPATIENT)
Dept: PRIMARY CARE CLINIC | Facility: CLINIC | Age: 79
End: 2024-02-19

## 2024-02-19 NOTE — TELEPHONE ENCOUNTER
From: Haven Rothman  To: Dr. Jade Dyer  Sent: 2/19/2024 2:37 PM EST  Subject: TPO    Hello,  I see my TPO results were very high. Should I be concerned with Hashimoto disease? My T4 and TSH were normal and you saw no concern with them.    Thanks,  haven rothman

## 2024-03-06 ENCOUNTER — HOSPITAL ENCOUNTER (OUTPATIENT)
Dept: CT IMAGING | Age: 79
Discharge: HOME | End: 2024-03-08
Payer: MEDICARE

## 2024-03-06 DIAGNOSIS — C77.2 PRIMARY COLON CANCER WITH METASTASIS TO 7 OR MORE REGIONAL LYMPH NODES (N2B) (HCC): ICD-10-CM

## 2024-03-06 DIAGNOSIS — C18.9 PRIMARY COLON CANCER WITH METASTASIS TO 7 OR MORE REGIONAL LYMPH NODES (N2B) (HCC): ICD-10-CM

## 2024-03-06 DIAGNOSIS — C18.2 MALIGNANT NEOPLASM OF ASCENDING COLON (HCC): ICD-10-CM

## 2024-03-06 LAB — CREAT BLD-MCNC: 0.63 MG/DL (ref 0.8–1.5)

## 2024-03-06 PROCEDURE — 71260 CT THORAX DX C+: CPT

## 2024-03-06 PROCEDURE — 82565 ASSAY OF CREATININE: CPT

## 2024-03-06 PROCEDURE — 6360000004 HC RX CONTRAST MEDICATION: Performed by: INTERNAL MEDICINE

## 2024-03-06 RX ADMIN — IOPAMIDOL 100 ML: 755 INJECTION, SOLUTION INTRAVENOUS at 10:01

## 2024-03-06 RX ADMIN — DIATRIZOATE MEGLUMINE AND DIATRIZOATE SODIUM 15 ML: 660; 100 LIQUID ORAL; RECTAL at 10:01

## 2024-03-07 DIAGNOSIS — C18.2 MALIGNANT NEOPLASM OF ASCENDING COLON (HCC): Primary | ICD-10-CM

## 2024-03-07 DIAGNOSIS — D64.9 ANEMIA, UNSPECIFIED TYPE: ICD-10-CM

## 2024-03-07 NOTE — PROGRESS NOTES
She is accepting of the risk/morbidity.  She wishes to repeat labs and imaging in 3mo.  She feels great.  She will c/w Xarelto - if risk transiently increased by hernia sx/malignancy, then VTE would be considered provoked - we reviewed duration of AC should be at least 6 mo as long as provoking risks are eliminated.  I rec she continues on AC for now.  Will see if next CT is concerning for malignancy.  May be able to de-escalate tx to prophy dosing in the future.  She has not reported bleeding or other se to report.  No res/GI concerns.  No s/s of infection.      Chronological Events:  3/6/23: Pt established primary care with Dr. Dyer with chief complaint of lump in her groin since 2017  -Routine labs included CBC which was significant for RBC 3.37, HGB 9.7, HCT 30.7 (Epic/Labs); -Referral placed to GI -CT A/P ordered  3/14/23: Telemedicine f/u with PCP   -MD notes hx of anemia and B12 deficiency requiring oral iron and B12 in the past; -Most recent colonoscopy over 12 years ago   3/15/23: pt advised to start oral b12   3/22/23: CT A/P showed no acute pathology within the abdomen or pelvis; fat-containing right inguinal hernia; a 1.0 cm indeterminate density exophytic right lower pole renal lesion, indeterminate but possibly reflecting complexes; a 0.8 cm cystic lesion in the pancreatic head, stable from October 2017 (consider follow-up imaging in 1 to 2 years to ensure stability); and colonic diverticulosis without evidence of diverticulitis. Renal ultrasound was recommended for further evaluation of right lower pole renal lesion (Epic/Imaging)  4/4/23: US retroperitoneal limited showed lower pole right renal lesion corresponding to a simple cyst. No further follow-up required. Kidneys are otherwise unremarkable. Possible mild fatty liver infiltration (Epic/Imaging)  7/21/23: Pt underwent laparoscopic right inguinal hernia repair with mesh with Dr. Romel Ricks   8/7/23: Pt presented to her PCP with complaints

## 2024-03-13 ENCOUNTER — OFFICE VISIT (OUTPATIENT)
Dept: ONCOLOGY | Age: 79
End: 2024-03-13
Payer: MEDICARE

## 2024-03-13 ENCOUNTER — HOSPITAL ENCOUNTER (OUTPATIENT)
Dept: LAB | Age: 79
Discharge: HOME | End: 2024-03-16
Payer: MEDICARE

## 2024-03-13 VITALS
OXYGEN SATURATION: 95 % | WEIGHT: 112.7 LBS | SYSTOLIC BLOOD PRESSURE: 131 MMHG | RESPIRATION RATE: 18 BRPM | BODY MASS INDEX: 20.61 KG/M2 | DIASTOLIC BLOOD PRESSURE: 87 MMHG | TEMPERATURE: 98.2 F | HEART RATE: 69 BPM

## 2024-03-13 DIAGNOSIS — Z71.89 GOALS OF CARE, COUNSELING/DISCUSSION: ICD-10-CM

## 2024-03-13 DIAGNOSIS — I26.99 BILATERAL PULMONARY EMBOLISM (HCC): ICD-10-CM

## 2024-03-13 DIAGNOSIS — Z79.01 ANTICOAGULATED: ICD-10-CM

## 2024-03-13 DIAGNOSIS — C77.2 PRIMARY COLON CANCER WITH METASTASIS TO 7 OR MORE REGIONAL LYMPH NODES (N2B) (HCC): ICD-10-CM

## 2024-03-13 DIAGNOSIS — D68.9 COAGULOPATHY (HCC): ICD-10-CM

## 2024-03-13 DIAGNOSIS — D64.9 ANEMIA, UNSPECIFIED TYPE: ICD-10-CM

## 2024-03-13 DIAGNOSIS — C18.9 PRIMARY COLON CANCER WITH METASTASIS TO 7 OR MORE REGIONAL LYMPH NODES (N2B) (HCC): ICD-10-CM

## 2024-03-13 DIAGNOSIS — C18.2 MALIGNANT NEOPLASM OF ASCENDING COLON (HCC): ICD-10-CM

## 2024-03-13 DIAGNOSIS — C18.2 MALIGNANT NEOPLASM OF ASCENDING COLON (HCC): Primary | ICD-10-CM

## 2024-03-13 PROBLEM — R53.83 OTHER FATIGUE: Status: RESOLVED | Noted: 2023-09-19 | Resolved: 2024-03-13

## 2024-03-13 PROBLEM — E87.70 HYPERVOLEMIA: Status: RESOLVED | Noted: 2023-11-17 | Resolved: 2024-03-13

## 2024-03-13 LAB
ALBUMIN SERPL-MCNC: 3.8 G/DL (ref 3.2–4.6)
ALBUMIN/GLOB SERPL: 1 (ref 0.4–1.6)
ALP SERPL-CCNC: 122 U/L (ref 50–136)
ALT SERPL-CCNC: 26 U/L (ref 12–65)
ANION GAP SERPL CALC-SCNC: 6 MMOL/L (ref 2–11)
AST SERPL-CCNC: 22 U/L (ref 15–37)
BASOPHILS # BLD: 0.1 K/UL (ref 0–0.2)
BASOPHILS NFR BLD: 1 % (ref 0–2)
BILIRUB SERPL-MCNC: 0.4 MG/DL (ref 0.2–1.1)
BUN SERPL-MCNC: 12 MG/DL (ref 8–23)
CALCIUM SERPL-MCNC: 9 MG/DL (ref 8.3–10.4)
CEA SERPL-MCNC: 3.8 NG/ML (ref 0–3)
CHLORIDE SERPL-SCNC: 106 MMOL/L (ref 103–113)
CO2 SERPL-SCNC: 28 MMOL/L (ref 21–32)
CREAT SERPL-MCNC: 1 MG/DL (ref 0.6–1)
DIFFERENTIAL METHOD BLD: NORMAL
EOSINOPHIL # BLD: 0.1 K/UL (ref 0–0.8)
EOSINOPHIL NFR BLD: 1 % (ref 0.5–7.8)
ERYTHROCYTE [DISTWIDTH] IN BLOOD BY AUTOMATED COUNT: 13.7 % (ref 11.9–14.6)
FERRITIN SERPL-MCNC: 34 NG/ML (ref 8–388)
GLOBULIN SER CALC-MCNC: 3.8 G/DL (ref 2.8–4.5)
GLUCOSE SERPL-MCNC: 144 MG/DL (ref 65–100)
HCT VFR BLD AUTO: 45.3 % (ref 35.8–46.3)
HGB BLD-MCNC: 15.1 G/DL (ref 11.7–15.4)
IMM GRANULOCYTES # BLD AUTO: 0 K/UL (ref 0–0.5)
IMM GRANULOCYTES NFR BLD AUTO: 0 % (ref 0–5)
IRON SATN MFR SERPL: 45 %
IRON SERPL-MCNC: 150 UG/DL (ref 35–150)
LYMPHOCYTES # BLD: 2.2 K/UL (ref 0.5–4.6)
LYMPHOCYTES NFR BLD: 23 % (ref 13–44)
MCH RBC QN AUTO: 29.7 PG (ref 26.1–32.9)
MCHC RBC AUTO-ENTMCNC: 33.3 G/DL (ref 31.4–35)
MCV RBC AUTO: 89 FL (ref 82–102)
MONOCYTES # BLD: 0.6 K/UL (ref 0.1–1.3)
MONOCYTES NFR BLD: 6 % (ref 4–12)
NEUTS SEG # BLD: 6.5 K/UL (ref 1.7–8.2)
NEUTS SEG NFR BLD: 69 % (ref 43–78)
NRBC # BLD: 0 K/UL (ref 0–0.2)
PLATELET # BLD AUTO: 337 K/UL (ref 150–450)
PMV BLD AUTO: 10.7 FL (ref 9.4–12.3)
POTASSIUM SERPL-SCNC: 3.7 MMOL/L (ref 3.5–5.1)
PROT SERPL-MCNC: 7.6 G/DL (ref 6.3–8.2)
RBC # BLD AUTO: 5.09 M/UL (ref 4.05–5.2)
SODIUM SERPL-SCNC: 140 MMOL/L (ref 136–146)
TIBC SERPL-MCNC: 335 UG/DL (ref 250–450)
WBC # BLD AUTO: 9.5 K/UL (ref 4.3–11.1)

## 2024-03-13 PROCEDURE — G8420 CALC BMI NORM PARAMETERS: HCPCS | Performed by: INTERNAL MEDICINE

## 2024-03-13 PROCEDURE — 1036F TOBACCO NON-USER: CPT | Performed by: INTERNAL MEDICINE

## 2024-03-13 PROCEDURE — G8399 PT W/DXA RESULTS DOCUMENT: HCPCS | Performed by: INTERNAL MEDICINE

## 2024-03-13 PROCEDURE — 36415 COLL VENOUS BLD VENIPUNCTURE: CPT

## 2024-03-13 PROCEDURE — 83540 ASSAY OF IRON: CPT

## 2024-03-13 PROCEDURE — 80053 COMPREHEN METABOLIC PANEL: CPT

## 2024-03-13 PROCEDURE — G8427 DOCREV CUR MEDS BY ELIG CLIN: HCPCS | Performed by: INTERNAL MEDICINE

## 2024-03-13 PROCEDURE — 82378 CARCINOEMBRYONIC ANTIGEN: CPT

## 2024-03-13 PROCEDURE — 99214 OFFICE O/P EST MOD 30 MIN: CPT | Performed by: INTERNAL MEDICINE

## 2024-03-13 PROCEDURE — 1090F PRES/ABSN URINE INCON ASSESS: CPT | Performed by: INTERNAL MEDICINE

## 2024-03-13 PROCEDURE — 82728 ASSAY OF FERRITIN: CPT

## 2024-03-13 PROCEDURE — G8484 FLU IMMUNIZE NO ADMIN: HCPCS | Performed by: INTERNAL MEDICINE

## 2024-03-13 PROCEDURE — 83550 IRON BINDING TEST: CPT

## 2024-03-13 PROCEDURE — 1123F ACP DISCUSS/DSCN MKR DOCD: CPT | Performed by: INTERNAL MEDICINE

## 2024-03-13 PROCEDURE — 85025 COMPLETE CBC W/AUTO DIFF WBC: CPT

## 2024-03-13 ASSESSMENT — PATIENT HEALTH QUESTIONNAIRE - PHQ9
SUM OF ALL RESPONSES TO PHQ QUESTIONS 1-9: 0
1. LITTLE INTEREST OR PLEASURE IN DOING THINGS: 0
SUM OF ALL RESPONSES TO PHQ QUESTIONS 1-9: 0
SUM OF ALL RESPONSES TO PHQ9 QUESTIONS 1 & 2: 0
2. FEELING DOWN, DEPRESSED OR HOPELESS: 0

## 2024-03-18 ENCOUNTER — TRANSCRIBE ORDERS (OUTPATIENT)
Dept: SCHEDULING | Age: 79
End: 2024-03-18

## 2024-03-18 DIAGNOSIS — Z12.31 SCREENING MAMMOGRAM FOR HIGH-RISK PATIENT: Primary | ICD-10-CM

## 2024-03-26 ENCOUNTER — PREP FOR PROCEDURE (OUTPATIENT)
Dept: GASTROENTEROLOGY | Age: 79
End: 2024-03-26

## 2024-03-26 ENCOUNTER — OFFICE VISIT (OUTPATIENT)
Age: 79
End: 2024-03-26
Payer: MEDICARE

## 2024-03-26 VITALS
WEIGHT: 112.6 LBS | HEIGHT: 62 IN | SYSTOLIC BLOOD PRESSURE: 119 MMHG | OXYGEN SATURATION: 99 % | DIASTOLIC BLOOD PRESSURE: 59 MMHG | RESPIRATION RATE: 16 BRPM | BODY MASS INDEX: 20.72 KG/M2 | HEART RATE: 70 BPM

## 2024-03-26 DIAGNOSIS — R19.7 DIARRHEA, UNSPECIFIED TYPE: ICD-10-CM

## 2024-03-26 DIAGNOSIS — C18.0 CECAL CANCER (HCC): ICD-10-CM

## 2024-03-26 DIAGNOSIS — C18.0 CECAL CANCER (HCC): Primary | ICD-10-CM

## 2024-03-26 PROCEDURE — 1036F TOBACCO NON-USER: CPT | Performed by: INTERNAL MEDICINE

## 2024-03-26 PROCEDURE — G8420 CALC BMI NORM PARAMETERS: HCPCS | Performed by: INTERNAL MEDICINE

## 2024-03-26 PROCEDURE — G8484 FLU IMMUNIZE NO ADMIN: HCPCS | Performed by: INTERNAL MEDICINE

## 2024-03-26 PROCEDURE — G8427 DOCREV CUR MEDS BY ELIG CLIN: HCPCS | Performed by: INTERNAL MEDICINE

## 2024-03-26 PROCEDURE — 1090F PRES/ABSN URINE INCON ASSESS: CPT | Performed by: INTERNAL MEDICINE

## 2024-03-26 PROCEDURE — 1123F ACP DISCUSS/DSCN MKR DOCD: CPT | Performed by: INTERNAL MEDICINE

## 2024-03-26 PROCEDURE — 99204 OFFICE O/P NEW MOD 45 MIN: CPT | Performed by: INTERNAL MEDICINE

## 2024-03-26 PROCEDURE — G8399 PT W/DXA RESULTS DOCUMENT: HCPCS | Performed by: INTERNAL MEDICINE

## 2024-03-26 RX ORDER — SODIUM CHLORIDE 0.9 % (FLUSH) 0.9 %
5-40 SYRINGE (ML) INJECTION EVERY 12 HOURS SCHEDULED
Status: CANCELLED | OUTPATIENT
Start: 2024-03-26

## 2024-03-26 RX ORDER — SODIUM CHLORIDE 9 MG/ML
25 INJECTION, SOLUTION INTRAVENOUS PRN
Status: CANCELLED | OUTPATIENT
Start: 2024-03-26

## 2024-03-26 RX ORDER — DICYCLOMINE HYDROCHLORIDE 10 MG/1
10 CAPSULE ORAL
COMMUNITY

## 2024-03-26 RX ORDER — SODIUM CHLORIDE 0.9 % (FLUSH) 0.9 %
5-40 SYRINGE (ML) INJECTION PRN
Status: CANCELLED | OUTPATIENT
Start: 2024-03-26

## 2024-03-26 NOTE — PROGRESS NOTES
Haven Hui (:  1945) is a 79 y.o. 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 Patient has been evaluated by Oncology and only wishes to proceed with surveillance rather than any chemotherapy, knowing the risks for recurrence. No issues reported at this time such as bleeding, weight loss, etc.     2) History of PE- currently on Xarelto 20mg PO daily     3) Diarrhea- nothing severe, possible related to ileo-cecal resection and bile acid in nature.     Plan  Will plan for a colonoscopy in 2 months for surveillance (6 months out from resection), if normal then recommend  a f/u in 1 year     Continue to take Xarelto as per Oncology (20mg tabs Po daily)     Imodium as needed for diarrhea, Benefiber daily, if ongoing issues with diarrhea then consider trial of Cholestyramine 4gm PO daily        Subjective   SUBJECTIVE/OBJECTIVE  Haven Hui is a 79 y.o. 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  Pt aware she has advanced disease with high risk for recurrence - refused tx accepting risks of recurrence/progression/morbidity/mortality. Wishes to p/w surveillance alone.     Today the patient states that she has frequent diarrhea but takes Bentyl which seems to help. Denies any weight loss, nausea, vomiting, abd pain or distension, overt GI bleeding, dysphagia, or issues since her resection.     Last colonoscopy was ~12 years ago and apparently unremarkable.            DIAGNOSIS   A:  \" ILEOCECAL MASS\":  ADENOCARCINOMA, MODERATELY DIFFERENTIATED GROSSLY MEASURING   APPROXIMATELY 6.5 X 3.4 X 3.3 CM. TUMOR INFILTRATES THROUGH

## 2024-03-26 NOTE — PATIENT INSTRUCTIONS
Will plan for a colonoscopy in 2 months for surveillance (6 months out from resection), if normal then recommend  a f/u in 1 year     Continue to take Xarelto as per Oncology (20mg tabs Po daily)     Imodium as needed for diarrhea, Benefiber daily, if ongoing issues with diarrhea then consider trial of Cholestyramine 4gm PO daily

## 2024-03-27 ENCOUNTER — TELEPHONE (OUTPATIENT)
Age: 79
End: 2024-03-27

## 2024-03-27 ENCOUNTER — TELEPHONE (OUTPATIENT)
Dept: GASTROENTEROLOGY | Age: 79
End: 2024-03-27

## 2024-03-27 NOTE — TELEPHONE ENCOUNTER
Haven Hui has called in wanted to reschedule her appointment on 05/10/2024, to the following week.

## 2024-04-09 DIAGNOSIS — E03.9 HYPOTHYROIDISM, UNSPECIFIED TYPE: ICD-10-CM

## 2024-04-09 LAB
T3 SERPL-MCNC: 0.83 NG/ML (ref 0.6–1.81)
T4 FREE SERPL-MCNC: 0.9 NG/DL (ref 0.78–1.46)
TSH, 3RD GENERATION: 3.84 UIU/ML (ref 0.36–3.74)

## 2024-04-19 ENCOUNTER — HOSPITAL ENCOUNTER (OUTPATIENT)
Dept: MAMMOGRAPHY | Age: 79
End: 2024-04-19
Attending: INTERNAL MEDICINE
Payer: MEDICARE

## 2024-04-19 DIAGNOSIS — Z12.31 SCREENING MAMMOGRAM FOR HIGH-RISK PATIENT: ICD-10-CM

## 2024-04-19 PROCEDURE — 77067 SCR MAMMO BI INCL CAD: CPT

## 2024-04-23 DIAGNOSIS — Z12.11 ENCOUNTER FOR SCREENING COLONOSCOPY: Primary | ICD-10-CM

## 2024-04-23 DIAGNOSIS — Z12.11 ENCOUNTER FOR SCREENING COLONOSCOPY: ICD-10-CM

## 2024-04-23 RX ORDER — SOD SULF/POT CHLORIDE/MAG SULF 1.479 G
12 TABLET ORAL SEE ADMIN INSTRUCTIONS
Qty: 24 TABLET | Refills: 0 | Status: SHIPPED | OUTPATIENT
Start: 2024-04-23 | End: 2024-04-24 | Stop reason: ALTCHOICE

## 2024-04-24 DIAGNOSIS — Z12.11 ENCOUNTER FOR SCREENING COLONOSCOPY: Primary | ICD-10-CM

## 2024-04-24 RX ORDER — POLYETHYLENE GLYCOL 3350, SODIUM SULFATE ANHYDROUS, SODIUM BICARBONATE, SODIUM CHLORIDE, POTASSIUM CHLORIDE 236; 22.74; 6.74; 5.86; 2.97 G/4L; G/4L; G/4L; G/4L; G/4L
POWDER, FOR SOLUTION ORAL
Qty: 1 ML | Refills: 0 | OUTPATIENT
Start: 2024-04-24

## 2024-04-24 RX ORDER — SODIUM, POTASSIUM,MAG SULFATES 17.5-3.13G
1 SOLUTION, RECONSTITUTED, ORAL ORAL ONCE
Qty: 1 EACH | Refills: 0 | Status: SHIPPED | OUTPATIENT
Start: 2024-04-24 | End: 2024-04-24

## 2024-04-25 ENCOUNTER — TELEPHONE (OUTPATIENT)
Age: 79
End: 2024-04-25

## 2024-04-25 DIAGNOSIS — Z12.11 ENCOUNTER FOR SCREENING COLONOSCOPY: Primary | ICD-10-CM

## 2024-04-25 DIAGNOSIS — Z12.11 ENCOUNTER FOR SCREENING COLONOSCOPY: ICD-10-CM

## 2024-04-25 RX ORDER — POLYETHYLENE GLYCOL 3350, SODIUM SULFATE ANHYDROUS, SODIUM BICARBONATE, SODIUM CHLORIDE, POTASSIUM CHLORIDE 236; 22.74; 6.74; 5.86; 2.97 G/4L; G/4L; G/4L; G/4L; G/4L
POWDER, FOR SOLUTION ORAL
Qty: 1 ML | Refills: 0 | OUTPATIENT
Start: 2024-04-25

## 2024-04-25 RX ORDER — SOD SULF/POT CHLORIDE/MAG SULF 1.479 G
12 TABLET ORAL SEE ADMIN INSTRUCTIONS
Qty: 24 TABLET | Refills: 0 | Status: SHIPPED | OUTPATIENT
Start: 2024-04-25 | End: 2024-04-25 | Stop reason: ALTCHOICE

## 2024-04-25 RX ORDER — SODIUM, POTASSIUM,MAG SULFATES 17.5-3.13G
1 SOLUTION, RECONSTITUTED, ORAL ORAL ONCE
Qty: 1 EACH | Refills: 0 | Status: SHIPPED | OUTPATIENT
Start: 2024-04-25 | End: 2024-04-25

## 2024-04-25 NOTE — TELEPHONE ENCOUNTER
Pt called clinic requesting SuTAB specifically be reordered as she states her insurance approved the medicaiton and gave her a prior authorization key: [id number]. Pt wants to  Sutab as ordered.

## 2024-04-25 NOTE — TELEPHONE ENCOUNTER
Pt called clinic in response to colonoscopy prep being switched from Sutab to Suprep r/t insurance pushback via pharmacy. Pt states she wants it switched back to SuTAB because she had already talked to her insurance company and gotten medication approved with key [id number]. Reordered SuTAB as originally prescribed by Dr. Kc. Instructed pt to call back if she encounters any issues picking up RX.

## 2024-04-25 NOTE — TELEPHONE ENCOUNTER
Per pt Mychart message following earlier conversation, pt now once again requesting prep be switched back to SuPREP instead of SuTAB because now pt's insurance is telling her they will not cover it. Order switched back to SuPREP per pt request.

## 2024-05-01 ENCOUNTER — PATIENT MESSAGE (OUTPATIENT)
Dept: PRIMARY CARE CLINIC | Facility: CLINIC | Age: 79
End: 2024-05-01

## 2024-05-01 DIAGNOSIS — E03.9 HYPOTHYROIDISM, UNSPECIFIED TYPE: ICD-10-CM

## 2024-05-02 RX ORDER — LEVOTHYROXINE SODIUM 0.05 MG/1
50 TABLET ORAL DAILY
Qty: 90 TABLET | Refills: 1 | Status: SHIPPED | OUTPATIENT
Start: 2024-05-02

## 2024-05-02 NOTE — TELEPHONE ENCOUNTER
From: Haven Rothman  To: Dr. Jade Dyer  Sent: 5/1/2024 4:58 PM EDT  Subject: [facility]    I tried to refill my Synthroid and they said you have to send in a prescription. I order 60 at a time.    thanks,  haven rothman

## 2024-05-14 NOTE — PERIOP NOTE
Patient verified name, , and procedure.    Type: 1a; abbreviated assessment per anesthesia guidelines    Labs per anesthesia: None    Instructed pt that they will be notified the day before their procedure by the GI Lab for time of arrival if their procedure is [place] and Pre-op for [place] cases. Arrival times should be called by 5 pm. If no phone is received the patient should contact their respective hospital. The GI lab telephone number is [phone] and ES Pre-op is [phone].     Follow diet and prep instructions per office including NPO status.      Bath or shower the night before and the am of surgery with non-moisturizing soap. No lotions, oils, powders, cologne on skin. No make up, eye make up or jewelry. Wear loose fitting comfortable, clean clothing.     Must have adult present in building the entire time .     Medications for the day of procedure Synthroid, patient to hold vitamins, supplements, herbals, and NSAIDs starting now per anesthesia guidelines.     Patient instructed to continue prescribed iron until DOS per anesthesia protocol.     Patient instructed to follow surgeon's instructions regarding Xarelto.     The following discharge instructions reviewed with patient: medication given during procedure may cause drowsiness for several hours, therefore, do not drive or operate machinery for remainder of the day. You may not drink alcohol on the day of your procedure, please resume regular diet and activity unless otherwise directed. You may experience abdominal distention for several hours that is relieved by the passage of gas. Contact your physician if you have any of the following: fever or chills, severe abdominal pain or excessive amount of bleeding or a large amount when having a bowel movement. Occasional specks of blood with bowel movement would not be unusual.

## 2024-05-19 PROCEDURE — G0105 COLORECTAL SCRN; HI RISK IND: HCPCS | Performed by: INTERNAL MEDICINE

## 2024-05-19 NOTE — H&P
visit (from the past 24 hour(s)).     Imaging/Diagnostics Last 24 Hours     See my note above, if applicable.       Assessment & Plan:   Surveillance CRC

## 2024-05-19 NOTE — PROCEDURES
Operative Report    Patient: Haven Hui MRN: [id number]      YOB: 1945  Age: 79 y.o.  Sex: female            Indications:  History of cecal cancer    Preoperative Evaluation: The patient was evaluated prior to the procedure in the GI lab admission area, the patient ASA was recorded .  Consent was obtained from the patient with the risk of perforation bleeding and aspiration.    Anesthesia: JOSELINE-per anesthesia    Complications: None; patient tolerated the procedure well.    EBL -insignificant      Procedure: The patient was sedated in the left lateral decubitus position.   Evaluation was performed to the R colon anastomosis.  The scope was withdrawn to the rectum, retroflexed view was performed.  The rectal exam was normal.  Preparation was good.     Findings:    Anastomosis in the right colon, staples present and intact, healthy appearing  Severe diverticulosis   No polyps identified on exam     Postoperative Diagnosis:   Anastomosis present in the right colon, staples present and intact, healthy appearing  Severe diverticulosis   No polyps identified on exam         Recommendations:   Repeat colonoscopy in 3 years for surveillance   High fiber diet indefinitely       Signed By:  Saul Kc MD     May 19, 2024

## 2024-05-20 ENCOUNTER — HOSPITAL ENCOUNTER (OUTPATIENT)
Age: 79
Setting detail: OUTPATIENT SURGERY
Discharge: HOME | End: 2024-05-20
Attending: INTERNAL MEDICINE | Admitting: INTERNAL MEDICINE
Payer: MEDICARE

## 2024-05-20 ENCOUNTER — ANESTHESIA EVENT (OUTPATIENT)
Dept: ENDOSCOPY | Age: 79
End: 2024-05-20
Payer: MEDICARE

## 2024-05-20 ENCOUNTER — ANESTHESIA (OUTPATIENT)
Dept: ENDOSCOPY | Age: 79
End: 2024-05-20
Payer: MEDICARE

## 2024-05-20 ENCOUNTER — OFFICE VISIT (OUTPATIENT)
Dept: PRIMARY CARE CLINIC | Facility: CLINIC | Age: 79
End: 2024-05-20
Payer: MEDICARE

## 2024-05-20 VITALS
BODY MASS INDEX: 20.76 KG/M2 | HEART RATE: 69 BPM | OXYGEN SATURATION: 98 % | WEIGHT: 112.8 LBS | SYSTOLIC BLOOD PRESSURE: 112 MMHG | HEIGHT: 62 IN | DIASTOLIC BLOOD PRESSURE: 60 MMHG

## 2024-05-20 VITALS
BODY MASS INDEX: 20.41 KG/M2 | HEIGHT: 62 IN | DIASTOLIC BLOOD PRESSURE: 61 MMHG | WEIGHT: 110.9 LBS | SYSTOLIC BLOOD PRESSURE: 125 MMHG | TEMPERATURE: 97.9 F | OXYGEN SATURATION: 99 % | HEART RATE: 60 BPM | RESPIRATION RATE: 18 BRPM

## 2024-05-20 DIAGNOSIS — M54.50 LOW BACK PAIN WITHOUT SCIATICA, UNSPECIFIED BACK PAIN LATERALITY, UNSPECIFIED CHRONICITY: ICD-10-CM

## 2024-05-20 DIAGNOSIS — E03.9 HYPOTHYROIDISM, UNSPECIFIED TYPE: Primary | ICD-10-CM

## 2024-05-20 DIAGNOSIS — I26.99 PULMONARY EMBOLISM, BILATERAL (HCC): ICD-10-CM

## 2024-05-20 PROBLEM — J96.01 ACUTE HYPOXEMIC RESPIRATORY FAILURE (HCC): Status: RESOLVED | Noted: 2023-11-17 | Resolved: 2024-05-20

## 2024-05-20 PROCEDURE — 3700000000 HC ANESTHESIA ATTENDED CARE: Performed by: INTERNAL MEDICINE

## 2024-05-20 PROCEDURE — 3700000001 HC ADD 15 MINUTES (ANESTHESIA): Performed by: INTERNAL MEDICINE

## 2024-05-20 PROCEDURE — 1036F TOBACCO NON-USER: CPT | Performed by: INTERNAL MEDICINE

## 2024-05-20 PROCEDURE — 3609027000 HC COLONOSCOPY: Performed by: INTERNAL MEDICINE

## 2024-05-20 PROCEDURE — 2580000003 HC RX 258: Performed by: ANESTHESIOLOGY

## 2024-05-20 PROCEDURE — 7100000011 HC PHASE II RECOVERY - ADDTL 15 MIN: Performed by: INTERNAL MEDICINE

## 2024-05-20 PROCEDURE — 2709999900 HC NON-CHARGEABLE SUPPLY: Performed by: INTERNAL MEDICINE

## 2024-05-20 PROCEDURE — 6360000002 HC RX W HCPCS: Performed by: NURSE ANESTHETIST, CERTIFIED REGISTERED

## 2024-05-20 PROCEDURE — 1123F ACP DISCUSS/DSCN MKR DOCD: CPT | Performed by: INTERNAL MEDICINE

## 2024-05-20 PROCEDURE — G8427 DOCREV CUR MEDS BY ELIG CLIN: HCPCS | Performed by: INTERNAL MEDICINE

## 2024-05-20 PROCEDURE — 99214 OFFICE O/P EST MOD 30 MIN: CPT | Performed by: INTERNAL MEDICINE

## 2024-05-20 PROCEDURE — 7100000010 HC PHASE II RECOVERY - FIRST 15 MIN: Performed by: INTERNAL MEDICINE

## 2024-05-20 PROCEDURE — 1090F PRES/ABSN URINE INCON ASSESS: CPT | Performed by: INTERNAL MEDICINE

## 2024-05-20 PROCEDURE — 2500000003 HC RX 250 WO HCPCS: Performed by: NURSE ANESTHETIST, CERTIFIED REGISTERED

## 2024-05-20 PROCEDURE — G8399 PT W/DXA RESULTS DOCUMENT: HCPCS | Performed by: INTERNAL MEDICINE

## 2024-05-20 PROCEDURE — G8420 CALC BMI NORM PARAMETERS: HCPCS | Performed by: INTERNAL MEDICINE

## 2024-05-20 RX ORDER — LIDOCAINE HYDROCHLORIDE 10 MG/ML
1 INJECTION, SOLUTION INFILTRATION; PERINEURAL
Status: DISCONTINUED | OUTPATIENT
Start: 2024-05-20 | End: 2024-05-20 | Stop reason: HOSPADM

## 2024-05-20 RX ORDER — BACLOFEN 10 MG/1
10 TABLET ORAL NIGHTLY PRN
Qty: 15 TABLET | Refills: 0 | Status: SHIPPED | OUTPATIENT
Start: 2024-05-20

## 2024-05-20 RX ORDER — SODIUM CHLORIDE 9 MG/ML
INJECTION, SOLUTION INTRAVENOUS PRN
Status: DISCONTINUED | OUTPATIENT
Start: 2024-05-20 | End: 2024-05-20 | Stop reason: HOSPADM

## 2024-05-20 RX ORDER — PROPOFOL 10 MG/ML
INJECTION, EMULSION INTRAVENOUS PRN
Status: DISCONTINUED | OUTPATIENT
Start: 2024-05-20 | End: 2024-05-20 | Stop reason: SDUPTHER

## 2024-05-20 RX ORDER — SODIUM CHLORIDE 0.9 % (FLUSH) 0.9 %
5-40 SYRINGE (ML) INJECTION EVERY 12 HOURS SCHEDULED
Status: DISCONTINUED | OUTPATIENT
Start: 2024-05-20 | End: 2024-05-20 | Stop reason: HOSPADM

## 2024-05-20 RX ORDER — NALOXONE HYDROCHLORIDE 0.4 MG/ML
INJECTION, SOLUTION INTRAMUSCULAR; INTRAVENOUS; SUBCUTANEOUS PRN
Status: DISCONTINUED | OUTPATIENT
Start: 2024-05-20 | End: 2024-05-20 | Stop reason: HOSPADM

## 2024-05-20 RX ORDER — SODIUM CHLORIDE 9 MG/ML
25 INJECTION, SOLUTION INTRAVENOUS PRN
Status: DISCONTINUED | OUTPATIENT
Start: 2024-05-20 | End: 2024-05-20 | Stop reason: HOSPADM

## 2024-05-20 RX ORDER — LIDOCAINE HYDROCHLORIDE 20 MG/ML
INJECTION, SOLUTION EPIDURAL; INFILTRATION; INTRACAUDAL; PERINEURAL PRN
Status: DISCONTINUED | OUTPATIENT
Start: 2024-05-20 | End: 2024-05-20 | Stop reason: SDUPTHER

## 2024-05-20 RX ORDER — SODIUM CHLORIDE 0.9 % (FLUSH) 0.9 %
5-40 SYRINGE (ML) INJECTION PRN
Status: DISCONTINUED | OUTPATIENT
Start: 2024-05-20 | End: 2024-05-20 | Stop reason: HOSPADM

## 2024-05-20 RX ORDER — SODIUM CHLORIDE, SODIUM LACTATE, POTASSIUM CHLORIDE, CALCIUM CHLORIDE 600; 310; 30; 20 MG/100ML; MG/100ML; MG/100ML; MG/100ML
INJECTION, SOLUTION INTRAVENOUS CONTINUOUS
Status: DISCONTINUED | OUTPATIENT
Start: 2024-05-20 | End: 2024-05-20 | Stop reason: HOSPADM

## 2024-05-20 RX ORDER — CURCUMIN 100 %
1 POWDER (GRAM) MISCELLANEOUS DAILY
COMMUNITY

## 2024-05-20 RX ADMIN — PROPOFOL 50 MG: 10 INJECTION, EMULSION INTRAVENOUS at 07:00

## 2024-05-20 RX ADMIN — PROPOFOL 100 MCG/KG/MIN: 10 INJECTION, EMULSION INTRAVENOUS at 07:01

## 2024-05-20 RX ADMIN — LIDOCAINE HYDROCHLORIDE 50 MG: 20 INJECTION, SOLUTION EPIDURAL; INFILTRATION; INTRACAUDAL; PERINEURAL at 07:00

## 2024-05-20 RX ADMIN — SODIUM CHLORIDE, POTASSIUM CHLORIDE, SODIUM LACTATE AND CALCIUM CHLORIDE: 600; 310; 30; 20 INJECTION, SOLUTION INTRAVENOUS at 06:07

## 2024-05-20 RX ADMIN — PROPOFOL 30 MG: 10 INJECTION, EMULSION INTRAVENOUS at 07:09

## 2024-05-20 SDOH — ECONOMIC STABILITY: FOOD INSECURITY: WITHIN THE PAST 12 MONTHS, THE FOOD YOU BOUGHT JUST DIDN'T LAST AND YOU DIDN'T HAVE MONEY TO GET MORE.: NEVER TRUE

## 2024-05-20 SDOH — ECONOMIC STABILITY: INCOME INSECURITY: HOW HARD IS IT FOR YOU TO PAY FOR THE VERY BASICS LIKE FOOD, HOUSING, MEDICAL CARE, AND HEATING?: NOT VERY HARD

## 2024-05-20 SDOH — ECONOMIC STABILITY: FOOD INSECURITY: WITHIN THE PAST 12 MONTHS, YOU WORRIED THAT YOUR FOOD WOULD RUN OUT BEFORE YOU GOT MONEY TO BUY MORE.: PATIENT DECLINED

## 2024-05-20 ASSESSMENT — ENCOUNTER SYMPTOMS
BACK PAIN: 1
ABDOMINAL PAIN: 0

## 2024-05-20 NOTE — DISCHARGE INSTRUCTIONS
Gastrointestinal Colonoscopy/Flexible Sigmoidoscopy - Lower Exam Discharge Instructions  Call Dr. Kc office for any problems or questions.  Contact the doctor’s office for follow up appointment as directed  Medication may cause drowsiness for several hours, therefore, do not drive or operate machinery for remainder of the day.  No alcohol today.  Ordinarily, you may resume regular diet and activity after exam unless otherwise specified by your physician.  Because of air put into your colon during exam, you may experience some abdominal distension, relieved by the passage of gas, for several hours.  Contact your physician if you have any of the following:  Excessive amount of bleeding - large amount when having a bowel movement.  Occasional specks of blood with bowel movement would not be unusual.  Severe abdominal pain  Fever or Chills  Polyp Removal - follow these additional instructions  Clear liquid diet for the next meal (jell-o, broth, clear drinks)  Soft diet for 24 hours, then resume regular diet   Take Metamucil - 1 tablespoon in juice every morning for 3 days  No Aspirin, Advil, Aleve, Nuprin, Ibuprofen, or medications that contain these drugs for 2 weeks.  Any additional instructions:      Postoperative Diagnosis:   Anastomosis present in the right colon, staples present and intact, healthy appearing  Severe diverticulosis   No polyps identified on exam      Recommendations:   Repeat colonoscopy in 3 years for surveillance   High fiber diet indefinitely

## 2024-05-20 NOTE — PROGRESS NOTES
Exercise per Week: 5 days     Minutes of Exercise per Session: 40 min   Stress: Not on file   Social Connections: Feeling Socially Integrated (12/20/2023)    OASIS : Social Isolation     Frequency of experiencing loneliness or isolation: Never   Intimate Partner Violence: Not on file   Housing Stability: Low Risk  (11/17/2023)    Housing Stability Vital Sign     Unable to Pay for Housing in the Last Year: No     Number of Places Lived in the Last Year: 1     Unstable Housing in the Last Year: No       Current Outpatient Medications   Medication Sig Dispense Refill    Turmeric, Curcuma Longa, (CURCUMIN) POWD 1 tablet by Does not apply route daily      levothyroxine (SYNTHROID) 50 MCG tablet Take 1 tablet by mouth daily 90 tablet 1    baclofen (LIORESAL) 10 MG tablet Take 1 tablet by mouth nightly as needed (back pain) 15 tablet 0    acetaminophen (TYLENOL) 500 MG tablet Take 2 tablets by mouth every 6 hours as needed for Pain      rivaroxaban (XARELTO) 20 MG TABS tablet Take 1 tablet by mouth daily (with breakfast) 30 tablet 5    iron-vitamin C (VITRON-C)  MG TABS  mg daily 90 tablet 1    Cholecalciferol (VITAMIN D-3 PO) Take 1,000 Units by mouth daily      Probiotic Product (PROBIOTIC DAILY PO) Take by mouth daily      Ascorbic Acid (VITAMIN C) 250 MG tablet Take 1 tablet by mouth daily      Multiple Vitamin (MULTIVITAMIN ADULT PO) Take by mouth daily       No current facility-administered medications for this visit.       Allergies as of 05/20/2024    (No Known Allergies)       Review of Systems   Constitutional:  Negative for fever.   Cardiovascular:  Negative for chest pain.   Gastrointestinal:  Negative for abdominal pain.   Genitourinary:  Negative for bladder incontinence.   Musculoskeletal:  Positive for back pain.       Objective:    Pulse 69, height 1.575 m (5' 2\"), weight 51.2 kg (112 lb 12.8 oz), SpO2 98 %.    Physical Exam  Vitals and nursing note reviewed.   Constitutional:

## 2024-05-20 NOTE — ANESTHESIA POSTPROCEDURE EVALUATION
Department of Anesthesiology  Postprocedure Note    Patient: Haven Hui  MRN: [id number]  YOB: 1945  Date of evaluation: 5/20/2024    Procedure Summary       Date: 05/20/24 Room / Location: [facility] [place] [facility] [place]    Anesthesia Start: 0654 Anesthesia Stop: 0725    Procedure: COLORECTAL CANCER SCREENING, NOT HIGH RISK Diagnosis:       Cecal cancer (HCC)      Diarrhea, unspecified type      (Cecal cancer (HCC) [C18.0])      (Diarrhea, unspecified type [R19.7])    Surgeons: Saul Kc MD Responsible Provider: Naman Reyes MD    Anesthesia Type: TIVA ASA Status: 3            Anesthesia Type: No value filed.    Eran Phase I:      Eran Phase II: Eran Score: 10    Anesthesia Post Evaluation    Patient location during evaluation: PACU  Patient participation: complete - patient participated  Level of consciousness: awake and alert  Airway patency: patent  Nausea & Vomiting: no nausea and no vomiting  Cardiovascular status: hemodynamically stable  Respiratory status: acceptable, nonlabored ventilation and spontaneous ventilation  Hydration status: euvolemic  Comments: /61   Pulse 60   Temp 97.9 °F (36.6 °C)   Resp 18   Ht 1.575 m (5' 2.01\")   Wt 50.3 kg (110 lb 14.4 oz)   SpO2 99%   BMI 20.28 kg/m²     Multimodal analgesia pain management approach  Pain management: adequate and satisfactory to patient    No notable events documented.

## 2024-05-20 NOTE — ANESTHESIA PRE PROCEDURE
Department of Anesthesiology  Preprocedure Note       Name:  Haven Hui   Age:  79 y.o.  :  1945                                          MRN:  [id number]         Date:  2024      Surgeon: Surgeon(s):  Saul Kc MD    Procedure: Procedure(s):  COLORECTAL CANCER SCREENING, NOT HIGH RISK    Medications prior to admission:   Prior to Admission medications    Medication Sig Start Date End Date Taking? Authorizing Provider   levothyroxine (SYNTHROID) 50 MCG tablet Take 1 tablet by mouth daily 24   Jade Dyer MD   baclofen (LIORESAL) 10 MG tablet Take 1 tablet by mouth nightly as needed (back pain)  Patient not taking: Reported on 2024   Jade Dyer MD   acetaminophen (TYLENOL) 500 MG tablet Take 2 tablets by mouth every 6 hours as needed for Pain    Monet Khan MD   rivaroxaban (XARELTO) 20 MG TABS tablet Take 1 tablet by mouth daily (with breakfast) 23   Sabas Hernandez MD   iron-vitamin C (VITRON-C)  MG TABS  mg daily 23   Jade Dyer MD   Cholecalciferol (VITAMIN D-3 PO) Take 1,000 Units by mouth daily    Monet Khan MD   Probiotic Product (PROBIOTIC DAILY PO) Take by mouth daily    Monet Khan MD   Ascorbic Acid (VITAMIN C) 250 MG tablet Take 1 tablet by mouth daily    Monet Khan MD   Multiple Vitamin (MULTIVITAMIN ADULT PO) Take by mouth daily    ProviderMonet MD       Current medications:    Current Facility-Administered Medications   Medication Dose Route Frequency Provider Last Rate Last Admin   • lidocaine 1 % injection 1 mL  1 mL IntraDERmal Once PRN Naman Reyes MD       • lactated ringers IV soln infusion   IntraVENous Continuous Naman Reyes  mL/hr at 24 0607 New Bag at 24 0607   • sodium chloride flush 0.9 % injection 5-40 mL  5-40 mL IntraVENous 2 times per day Naman Reyes MD       • sodium chloride flush 0.9 % injection 5-40 mL  5-40

## 2024-05-20 NOTE — PERIOP NOTE
MD Reyes  at bedside with patient. Pt VSS stable. Pain and Nausea controlled at this time. Verbal sign out per MD when pacu care is completed. Plan of care continues.

## 2024-06-15 ENCOUNTER — PATIENT MESSAGE (OUTPATIENT)
Dept: PRIMARY CARE CLINIC | Facility: CLINIC | Age: 79
End: 2024-06-15

## 2024-06-17 RX ORDER — DICYCLOMINE HYDROCHLORIDE 10 MG/1
10 CAPSULE ORAL
Qty: 360 CAPSULE | Refills: 1 | Status: SHIPPED | OUTPATIENT
Start: 2024-06-17

## 2024-06-17 NOTE — TELEPHONE ENCOUNTER
From: Haven Rothman  To: Dr. Jade Dyer  Sent: 6/15/2024 8:58 AM EDT  Subject: Dicyclomine    Good morning,    Please renew my prescription for Dicyclomine 10 mg capsules. [facility][id number]  no mata.  thank you,  haven rothman

## 2024-06-18 DIAGNOSIS — C18.9 PRIMARY COLON CANCER WITH METASTASIS TO 7 OR MORE REGIONAL LYMPH NODES (N2B) (HCC): ICD-10-CM

## 2024-06-18 DIAGNOSIS — C77.2 PRIMARY COLON CANCER WITH METASTASIS TO 7 OR MORE REGIONAL LYMPH NODES (N2B) (HCC): ICD-10-CM

## 2024-06-18 DIAGNOSIS — C18.2 MALIGNANT NEOPLASM OF ASCENDING COLON (HCC): ICD-10-CM

## 2024-06-18 DIAGNOSIS — D64.9 ANEMIA, UNSPECIFIED TYPE: Primary | ICD-10-CM

## 2024-06-19 ENCOUNTER — HOSPITAL ENCOUNTER (OUTPATIENT)
Dept: CT IMAGING | Age: 79
Discharge: HOME | End: 2024-06-21
Payer: MEDICARE

## 2024-06-19 DIAGNOSIS — C77.2 PRIMARY COLON CANCER WITH METASTASIS TO 7 OR MORE REGIONAL LYMPH NODES (N2B) (HCC): ICD-10-CM

## 2024-06-19 DIAGNOSIS — C18.2 MALIGNANT NEOPLASM OF ASCENDING COLON (HCC): ICD-10-CM

## 2024-06-19 DIAGNOSIS — C18.9 PRIMARY COLON CANCER WITH METASTASIS TO 7 OR MORE REGIONAL LYMPH NODES (N2B) (HCC): ICD-10-CM

## 2024-06-19 LAB — CREAT BLD-MCNC: 0.71 MG/DL (ref 0.8–1.5)

## 2024-06-19 PROCEDURE — 82565 ASSAY OF CREATININE: CPT

## 2024-06-19 PROCEDURE — 74177 CT ABD & PELVIS W/CONTRAST: CPT

## 2024-06-19 PROCEDURE — 6360000004 HC RX CONTRAST MEDICATION: Performed by: INTERNAL MEDICINE

## 2024-06-19 RX ADMIN — DIATRIZOATE MEGLUMINE AND DIATRIZOATE SODIUM 15 ML: 660; 100 LIQUID ORAL; RECTAL at 09:46

## 2024-06-19 RX ADMIN — IOPAMIDOL 100 ML: 755 INJECTION, SOLUTION INTRAVENOUS at 09:46

## 2024-06-21 ENCOUNTER — HOSPITAL ENCOUNTER (OUTPATIENT)
Dept: LAB | Age: 79
End: 2024-06-21
Payer: MEDICARE

## 2024-06-21 ENCOUNTER — OFFICE VISIT (OUTPATIENT)
Dept: ONCOLOGY | Age: 79
End: 2024-06-21

## 2024-06-21 VITALS
BODY MASS INDEX: 20.98 KG/M2 | SYSTOLIC BLOOD PRESSURE: 124 MMHG | WEIGHT: 114 LBS | OXYGEN SATURATION: 99 % | DIASTOLIC BLOOD PRESSURE: 56 MMHG | TEMPERATURE: 97.8 F | HEART RATE: 54 BPM | RESPIRATION RATE: 16 BRPM | HEIGHT: 62 IN

## 2024-06-21 DIAGNOSIS — C77.2 PRIMARY COLON CANCER WITH METASTASIS TO 7 OR MORE REGIONAL LYMPH NODES (N2B) (HCC): ICD-10-CM

## 2024-06-21 DIAGNOSIS — C18.0 CECAL CANCER (HCC): ICD-10-CM

## 2024-06-21 DIAGNOSIS — D64.9 ANEMIA, UNSPECIFIED TYPE: ICD-10-CM

## 2024-06-21 DIAGNOSIS — C18.9 PRIMARY COLON CANCER WITH METASTASIS TO 7 OR MORE REGIONAL LYMPH NODES (N2B) (HCC): ICD-10-CM

## 2024-06-21 DIAGNOSIS — I26.99 PULMONARY EMBOLISM, BILATERAL (HCC): Primary | ICD-10-CM

## 2024-06-21 LAB
ALBUMIN SERPL-MCNC: 4 G/DL (ref 3.2–4.6)
ALBUMIN/GLOB SERPL: 1.3 (ref 1–1.9)
ALP SERPL-CCNC: 97 U/L (ref 35–104)
ALT SERPL-CCNC: 16 U/L (ref 12–65)
ANION GAP SERPL CALC-SCNC: 12 MMOL/L (ref 9–18)
AST SERPL-CCNC: 24 U/L (ref 15–37)
BASOPHILS # BLD: 0.1 K/UL (ref 0–0.2)
BASOPHILS NFR BLD: 2 % (ref 0–2)
BILIRUB SERPL-MCNC: 0.4 MG/DL (ref 0–1.2)
BUN SERPL-MCNC: 16 MG/DL (ref 8–23)
CALCIUM SERPL-MCNC: 9.7 MG/DL (ref 8.8–10.2)
CEA SERPL-MCNC: 6.5 NG/ML (ref 0–3.8)
CHLORIDE SERPL-SCNC: 105 MMOL/L (ref 98–107)
CO2 SERPL-SCNC: 27 MMOL/L (ref 20–28)
CREAT SERPL-MCNC: 0.83 MG/DL (ref 0.6–1.1)
DIFFERENTIAL METHOD BLD: NORMAL
EOSINOPHIL # BLD: 0.1 K/UL (ref 0–0.8)
EOSINOPHIL NFR BLD: 2 % (ref 0.5–7.8)
ERYTHROCYTE [DISTWIDTH] IN BLOOD BY AUTOMATED COUNT: 13.5 % (ref 11.9–14.6)
FERRITIN SERPL-MCNC: 82 NG/ML (ref 8–388)
GLOBULIN SER CALC-MCNC: 3.2 G/DL (ref 2.3–3.5)
GLUCOSE SERPL-MCNC: 94 MG/DL (ref 70–99)
HCT VFR BLD AUTO: 43.6 % (ref 35.8–46.3)
HGB BLD-MCNC: 14.4 G/DL (ref 11.7–15.4)
IMM GRANULOCYTES # BLD AUTO: 0 K/UL (ref 0–0.5)
IMM GRANULOCYTES NFR BLD AUTO: 0 % (ref 0–5)
IRON SATN MFR SERPL: 49 % (ref 20–50)
IRON SERPL-MCNC: 164 UG/DL (ref 35–100)
LYMPHOCYTES # BLD: 1.7 K/UL (ref 0.5–4.6)
LYMPHOCYTES NFR BLD: 25 % (ref 13–44)
MCH RBC QN AUTO: 31.9 PG (ref 26.1–32.9)
MCHC RBC AUTO-ENTMCNC: 33 G/DL (ref 31.4–35)
MCV RBC AUTO: 96.5 FL (ref 82–102)
MONOCYTES # BLD: 0.7 K/UL (ref 0.1–1.3)
MONOCYTES NFR BLD: 10 % (ref 4–12)
NEUTS SEG # BLD: 4.2 K/UL (ref 1.7–8.2)
NEUTS SEG NFR BLD: 61 % (ref 43–78)
NRBC # BLD: 0 K/UL (ref 0–0.2)
PLATELET # BLD AUTO: 290 K/UL (ref 150–450)
PMV BLD AUTO: 10.8 FL (ref 9.4–12.3)
POTASSIUM SERPL-SCNC: 5 MMOL/L (ref 3.5–5.1)
PROT SERPL-MCNC: 7.2 G/DL (ref 6.3–8.2)
RBC # BLD AUTO: 4.52 M/UL (ref 4.05–5.2)
SODIUM SERPL-SCNC: 144 MMOL/L (ref 136–145)
TIBC SERPL-MCNC: 337 UG/DL (ref 240–450)
UIBC SERPL-MCNC: 173 UG/DL (ref 112–347)
WBC # BLD AUTO: 6.8 K/UL (ref 4.3–11.1)

## 2024-06-21 PROCEDURE — 83540 ASSAY OF IRON: CPT

## 2024-06-21 PROCEDURE — 82728 ASSAY OF FERRITIN: CPT

## 2024-06-21 PROCEDURE — 80053 COMPREHEN METABOLIC PANEL: CPT

## 2024-06-21 PROCEDURE — 83550 IRON BINDING TEST: CPT

## 2024-06-21 PROCEDURE — 85025 COMPLETE CBC W/AUTO DIFF WBC: CPT

## 2024-06-21 PROCEDURE — 36415 COLL VENOUS BLD VENIPUNCTURE: CPT

## 2024-06-21 PROCEDURE — 82378 CARCINOEMBRYONIC ANTIGEN: CPT

## 2024-06-21 ASSESSMENT — PATIENT HEALTH QUESTIONNAIRE - PHQ9
1. LITTLE INTEREST OR PLEASURE IN DOING THINGS: NOT AT ALL
SUM OF ALL RESPONSES TO PHQ QUESTIONS 1-9: 0
2. FEELING DOWN, DEPRESSED OR HOPELESS: NOT AT ALL
SUM OF ALL RESPONSES TO PHQ QUESTIONS 1-9: 0
SUM OF ALL RESPONSES TO PHQ9 QUESTIONS 1 & 2: 0

## 2024-08-13 DIAGNOSIS — C18.9 PRIMARY COLON CANCER WITH METASTASIS TO 7 OR MORE REGIONAL LYMPH NODES (N2B) (HCC): ICD-10-CM

## 2024-08-13 DIAGNOSIS — C77.2 PRIMARY COLON CANCER WITH METASTASIS TO 7 OR MORE REGIONAL LYMPH NODES (N2B) (HCC): ICD-10-CM

## 2024-08-13 DIAGNOSIS — C18.2 MALIGNANT NEOPLASM OF ASCENDING COLON (HCC): Primary | ICD-10-CM

## 2024-08-16 DIAGNOSIS — C18.2 MALIGNANT NEOPLASM OF ASCENDING COLON (HCC): Primary | ICD-10-CM

## 2024-08-16 DIAGNOSIS — C77.2 PRIMARY COLON CANCER WITH METASTASIS TO 7 OR MORE REGIONAL LYMPH NODES (N2B) (HCC): ICD-10-CM

## 2024-08-16 DIAGNOSIS — C18.9 PRIMARY COLON CANCER WITH METASTASIS TO 7 OR MORE REGIONAL LYMPH NODES (N2B) (HCC): ICD-10-CM

## 2024-08-20 ENCOUNTER — PATIENT MESSAGE (OUTPATIENT)
Dept: PRIMARY CARE CLINIC | Facility: CLINIC | Age: 79
End: 2024-08-20

## 2024-09-10 ENCOUNTER — TELEPHONE (OUTPATIENT)
Dept: ONCOLOGY | Age: 79
End: 2024-09-10

## 2024-09-10 DIAGNOSIS — C18.2 MALIGNANT NEOPLASM OF ASCENDING COLON (HCC): ICD-10-CM

## 2024-09-10 DIAGNOSIS — E03.9 HYPOTHYROIDISM, UNSPECIFIED TYPE: ICD-10-CM

## 2024-09-10 DIAGNOSIS — C18.2 MALIGNANT NEOPLASM OF ASCENDING COLON (HCC): Primary | ICD-10-CM

## 2024-09-10 LAB
ALBUMIN SERPL-MCNC: 4 G/DL (ref 3.2–4.6)
ALBUMIN/GLOB SERPL: 1.4 (ref 1–1.9)
ALP SERPL-CCNC: 91 U/L (ref 35–104)
ALT SERPL-CCNC: 17 U/L (ref 12–65)
ANION GAP SERPL CALC-SCNC: 13 MMOL/L (ref 9–18)
AST SERPL-CCNC: 24 U/L (ref 15–37)
BASOPHILS # BLD: 0.1 K/UL (ref 0–0.2)
BASOPHILS NFR BLD: 2 % (ref 0–2)
BILIRUB SERPL-MCNC: 0.3 MG/DL (ref 0–1.2)
BUN SERPL-MCNC: 16 MG/DL (ref 8–23)
CALCIUM SERPL-MCNC: 9.5 MG/DL (ref 8.8–10.2)
CEA SERPL-MCNC: 7.7 NG/ML (ref 0–3.8)
CHLORIDE SERPL-SCNC: 102 MMOL/L (ref 98–107)
CO2 SERPL-SCNC: 24 MMOL/L (ref 20–28)
CREAT SERPL-MCNC: 0.83 MG/DL (ref 0.6–1.1)
DIFFERENTIAL METHOD BLD: NORMAL
EOSINOPHIL # BLD: 0.1 K/UL (ref 0–0.8)
EOSINOPHIL NFR BLD: 2 % (ref 0.5–7.8)
ERYTHROCYTE [DISTWIDTH] IN BLOOD BY AUTOMATED COUNT: 12.7 % (ref 11.9–14.6)
GLOBULIN SER CALC-MCNC: 2.9 G/DL (ref 2.3–3.5)
GLUCOSE SERPL-MCNC: 90 MG/DL (ref 70–99)
HCT VFR BLD AUTO: 41.5 % (ref 35.8–46.3)
HGB BLD-MCNC: 13.7 G/DL (ref 11.7–15.4)
IMM GRANULOCYTES # BLD AUTO: 0 K/UL (ref 0–0.5)
IMM GRANULOCYTES NFR BLD AUTO: 0 % (ref 0–5)
LYMPHOCYTES # BLD: 2.1 K/UL (ref 0.5–4.6)
LYMPHOCYTES NFR BLD: 29 % (ref 13–44)
MCH RBC QN AUTO: 32.1 PG (ref 26.1–32.9)
MCHC RBC AUTO-ENTMCNC: 33 G/DL (ref 31.4–35)
MCV RBC AUTO: 97.2 FL (ref 82–102)
MONOCYTES # BLD: 0.6 K/UL (ref 0.1–1.3)
MONOCYTES NFR BLD: 9 % (ref 4–12)
NEUTS SEG # BLD: 4.2 K/UL (ref 1.7–8.2)
NEUTS SEG NFR BLD: 58 % (ref 43–78)
NRBC # BLD: 0 K/UL (ref 0–0.2)
PLATELET # BLD AUTO: 310 K/UL (ref 150–450)
PMV BLD AUTO: 11.4 FL (ref 9.4–12.3)
POTASSIUM SERPL-SCNC: 4.7 MMOL/L (ref 3.5–5.1)
PROT SERPL-MCNC: 6.9 G/DL (ref 6.3–8.2)
RBC # BLD AUTO: 4.27 M/UL (ref 4.05–5.2)
SODIUM SERPL-SCNC: 140 MMOL/L (ref 136–145)
TSH, 3RD GENERATION: 11.1 UIU/ML (ref 0.27–4.2)
WBC # BLD AUTO: 7.1 K/UL (ref 4.3–11.1)

## 2024-09-12 ENCOUNTER — PATIENT MESSAGE (OUTPATIENT)
Dept: ONCOLOGY | Age: 79
End: 2024-09-12

## 2024-09-12 DIAGNOSIS — C18.2 MALIGNANT NEOPLASM OF ASCENDING COLON (HCC): ICD-10-CM

## 2024-09-12 DIAGNOSIS — C77.2 PRIMARY COLON CANCER WITH METASTASIS TO 7 OR MORE REGIONAL LYMPH NODES (N2B) (HCC): Primary | ICD-10-CM

## 2024-09-12 DIAGNOSIS — C18.9 PRIMARY COLON CANCER WITH METASTASIS TO 7 OR MORE REGIONAL LYMPH NODES (N2B) (HCC): Primary | ICD-10-CM

## 2024-09-12 DIAGNOSIS — R97.0 ELEVATED CEA: ICD-10-CM

## 2024-10-08 ENCOUNTER — TELEMEDICINE (OUTPATIENT)
Dept: PRIMARY CARE CLINIC | Facility: CLINIC | Age: 79
End: 2024-10-08
Payer: MEDICARE

## 2024-10-08 DIAGNOSIS — E03.9 HYPOTHYROIDISM, UNSPECIFIED TYPE: Primary | ICD-10-CM

## 2024-10-08 DIAGNOSIS — C18.9 MALIGNANT NEOPLASM OF COLON, UNSPECIFIED PART OF COLON (HCC): ICD-10-CM

## 2024-10-08 PROCEDURE — 1090F PRES/ABSN URINE INCON ASSESS: CPT | Performed by: INTERNAL MEDICINE

## 2024-10-08 PROCEDURE — G8399 PT W/DXA RESULTS DOCUMENT: HCPCS | Performed by: INTERNAL MEDICINE

## 2024-10-08 PROCEDURE — G8484 FLU IMMUNIZE NO ADMIN: HCPCS | Performed by: INTERNAL MEDICINE

## 2024-10-08 PROCEDURE — 1123F ACP DISCUSS/DSCN MKR DOCD: CPT | Performed by: INTERNAL MEDICINE

## 2024-10-08 PROCEDURE — 99214 OFFICE O/P EST MOD 30 MIN: CPT | Performed by: INTERNAL MEDICINE

## 2024-10-08 PROCEDURE — G8427 DOCREV CUR MEDS BY ELIG CLIN: HCPCS | Performed by: INTERNAL MEDICINE

## 2024-10-08 PROCEDURE — 1036F TOBACCO NON-USER: CPT | Performed by: INTERNAL MEDICINE

## 2024-10-08 PROCEDURE — G8420 CALC BMI NORM PARAMETERS: HCPCS | Performed by: INTERNAL MEDICINE

## 2024-10-08 RX ORDER — LEVOTHYROXINE SODIUM 75 UG/1
75 TABLET ORAL DAILY
Qty: 90 TABLET | Refills: 1 | Status: SHIPPED | OUTPATIENT
Start: 2024-10-08 | End: 2024-10-08 | Stop reason: SDUPTHER

## 2024-10-08 RX ORDER — LEVOTHYROXINE SODIUM 75 UG/1
75 TABLET ORAL DAILY
Qty: 90 TABLET | Refills: 1 | Status: SHIPPED | OUTPATIENT
Start: 2024-10-08

## 2024-10-08 ASSESSMENT — ENCOUNTER SYMPTOMS
BACK PAIN: 1
ABDOMINAL PAIN: 0

## 2024-10-08 NOTE — PROGRESS NOTES
FOLLOW UP VISIT    Subjective:    Haven Hui (: 1945) is a 79 y.o., female,   Chief Complaint   Patient presents with    Hypothyroidism       HPI:  Very nice 79 year old in for follow up of thyroid TSH high last check .   She has Hashimoto's.   She has had  a COLO  today .  She saw Dr. Hernandez.    She is going to Onc in  is going to do Surveillance     1. Anemia- has seen GI has Colon Ca  - has cecal cancer   2. Finger pain is improved    3. Inguinal Hernia- on CT   4. Diverticulosis stable  5. Renal lesion- US normal no follow up needed  6. Pancreatic Cyst stable from 2017 repeat CT done  7.         Thyroid d/o on meds TSH last check 6  8.          Back pain  9.          HCM UTD due for AWV and vaccines  10.        PE on Xarelto          Back Pain  This is a new problem. The current episode started 1 to 4 weeks ago. The problem occurs daily. The problem has been gradually worsening since onset. The pain is present in the lumbar spine. The quality of the pain is described as aching. Pertinent negatives include no abdominal pain, bladder incontinence, chest pain, fever, paresis or perianal numbness.         The following portions of the patient's history were reviewed and updated as appropriate:      Past Medical History:   Diagnosis Date    Anemia     Rx iron    Cecal cancer (HCC)     History of pulmonary embolism     on Xarelto    Hypothyroidism     managed with medication    Malignant neoplasm of ascending colon (HCC)     Primary colon cancer with metastasis to 7 or more regional lymph nodes (N2b) (HCC)        Past Surgical History:   Procedure Laterality Date    APPENDECTOMY  11/15/23    CATARACT REMOVAL Bilateral     COLONOSCOPY N/A 2024    COLORECTAL CANCER SCREENING, NOT HIGH RISK performed by Saul Kc MD at [facility] ENDOSCOPY    HERNIA REPAIR Right 2023    LAPAROSCOPIC RIGHT INGUINAL HERNIA REPAIR WITH MESH performed by Romel Ricks MD at [facility] MAIN OR    LAPAROSCOPIC

## 2024-10-11 ENCOUNTER — HOSPITAL ENCOUNTER (OUTPATIENT)
Dept: PET IMAGING | Age: 79
Discharge: HOME | End: 2024-10-14
Payer: MEDICARE

## 2024-10-11 DIAGNOSIS — R97.0 ELEVATED CEA: ICD-10-CM

## 2024-10-11 DIAGNOSIS — C77.2 PRIMARY COLON CANCER WITH METASTASIS TO 7 OR MORE REGIONAL LYMPH NODES (N2B) (HCC): ICD-10-CM

## 2024-10-11 DIAGNOSIS — C18.2 MALIGNANT NEOPLASM OF ASCENDING COLON (HCC): ICD-10-CM

## 2024-10-11 DIAGNOSIS — C18.9 PRIMARY COLON CANCER WITH METASTASIS TO 7 OR MORE REGIONAL LYMPH NODES (N2B) (HCC): ICD-10-CM

## 2024-10-11 LAB
GLUCOSE BLD STRIP.AUTO-MCNC: 85 MG/DL (ref 65–100)
SERVICE CMNT-IMP: NORMAL

## 2024-10-11 PROCEDURE — 78815 PET IMAGE W/CT SKULL-THIGH: CPT

## 2024-10-11 PROCEDURE — 3430000000 HC RX DIAGNOSTIC RADIOPHARMACEUTICAL: Performed by: INTERNAL MEDICINE

## 2024-10-11 PROCEDURE — 6360000004 HC RX CONTRAST MEDICATION: Performed by: INTERNAL MEDICINE

## 2024-10-11 PROCEDURE — 2580000003 HC RX 258: Performed by: INTERNAL MEDICINE

## 2024-10-11 PROCEDURE — A9609 HC RX DIAGNOSTIC RADIOPHARMACEUTICAL: HCPCS | Performed by: INTERNAL MEDICINE

## 2024-10-11 PROCEDURE — 82962 GLUCOSE BLOOD TEST: CPT

## 2024-10-11 RX ORDER — FLUDEOXYGLUCOSE F 18 200 MCI/ML
11.3 INJECTION, SOLUTION INTRAVENOUS
Status: COMPLETED | OUTPATIENT
Start: 2024-10-11 | End: 2024-10-11

## 2024-10-11 RX ORDER — SODIUM CHLORIDE 0.9 % (FLUSH) 0.9 %
20 SYRINGE (ML) INJECTION AS NEEDED
Status: DISCONTINUED | OUTPATIENT
Start: 2024-10-11 | End: 2024-10-15 | Stop reason: HOSPADM

## 2024-10-11 RX ORDER — DIATRIZOATE MEGLUMINE AND DIATRIZOATE SODIUM 660; 100 MG/ML; MG/ML
10 SOLUTION ORAL; RECTAL
Status: DISCONTINUED | OUTPATIENT
Start: 2024-10-11 | End: 2024-10-15 | Stop reason: HOSPADM

## 2024-10-11 RX ADMIN — DIATRIZOATE MEGLUMINE AND DIATRIZOATE SODIUM 10 ML: 660; 100 LIQUID ORAL; RECTAL at 10:15

## 2024-10-11 RX ADMIN — SODIUM CHLORIDE, PRESERVATIVE FREE 20 ML: 5 INJECTION INTRAVENOUS at 10:15

## 2024-10-11 RX ADMIN — FLUDEOXYGLUCOSE F 18 11.3 MILLICURIE: 200 INJECTION, SOLUTION INTRAVENOUS at 10:15

## 2024-10-15 DIAGNOSIS — C18.0 CECAL CANCER (HCC): Primary | ICD-10-CM

## 2024-10-16 ENCOUNTER — TELEPHONE (OUTPATIENT)
Dept: ONCOLOGY | Age: 79
End: 2024-10-16

## 2024-10-16 DIAGNOSIS — I26.99 OTHER PULMONARY EMBOLISM WITHOUT ACUTE COR PULMONALE (HCC): ICD-10-CM

## 2024-10-16 DIAGNOSIS — D64.9 ANEMIA, UNSPECIFIED: ICD-10-CM

## 2024-10-16 RX ORDER — RIVAROXABAN 20 MG/1
20 TABLET, FILM COATED ORAL
Qty: 30 TABLET | Refills: 4 | OUTPATIENT
Start: 2024-10-16

## 2024-10-16 NOTE — TELEPHONE ENCOUNTER
Chart reviewed by Dr. Johnson's team.  Received instruction to proceed with refill request.    Medication Requested: Xarelto    Date last prescribed: 11/21/23    [facility]    Action Taken: Chart reviewed, refill pended and routed for signature.

## 2024-10-16 NOTE — TELEPHONE ENCOUNTER
Physician provider: Dr. Johnson  Reason for today's call (Please detail here patients chief complaint): refill  Last office visit:06/21/24  Patient Callback Number: [phone]   Was callback number verified?: Yes  Preferred pharmacy (If refill request): [facility]/pharmacy #[id number] [address] - P [phone] - F [phone]   Calls to office within the last 48 hours?:No    Patient notified that their information will be routed to the [facility] clinical triage team for review. Patient is advised that they will receive a phone call from the triage department. If symptom related and symptoms worsen before receiving a call back, the patient has been advised to proceed to the nearest ED.       rivaroxaban (XARELTO) 20 MG TABS tablet

## 2024-10-21 DIAGNOSIS — C18.2 MALIGNANT NEOPLASM OF ASCENDING COLON (HCC): Primary | ICD-10-CM

## 2024-10-21 ASSESSMENT — ENCOUNTER SYMPTOMS
ABDOMINAL DISTENTION: 0
BLOOD IN STOOL: 0
CONSTIPATION: 0
TROUBLE SWALLOWING: 0
VOMITING: 0
HEMOPTYSIS: 0
WHEEZING: 0
ABDOMINAL PAIN: 0
SORE THROAT: 0
SHORTNESS OF BREATH: 0
SCLERAL ICTERUS: 0
DIARRHEA: 0
VOICE CHANGE: 0
NAUSEA: 0
CHEST TIGHTNESS: 0

## 2024-10-21 NOTE — PROGRESS NOTES
discussed.  Labs from today's visit reviewed.  Will add on CEA postsurgically.  If stage IV disease confirmed, her treatment will be palliative in nature.  [facility] discussion.    - here for FU with her son.  We discussed her CT results and also labs.  CEA is increased at 3.8 from 2.5.  she understands that CEA is a marker that is sensitive but not specific for just colon ca.  She understands that CT did not show evid or recurrent disease per rad but the disease may be too small to see yet.  Role of PET discussed and she wishes to defer at this time.  She elected not to p/w chemotherapy adjuvantly and understands that she has high risk for disease to recur.  She is accepting of the risk/morbidity.  She wishes to repeat labs and imaging in 3mo.  She feels great.  She will c/w Xarelto - if risk transiently increased by hernia sx/malignancy, then VTE would be considered provoked - we reviewed duration of AC should be at least 6 mo as long as provoking risks are eliminated.  I rec she continues on AC for now.  Will see if next CT is concerning for malignancy.  May be able to de-escalate tx to prophy dosing in the future.  She has not reported bleeding or other se to report.  No res/GI concerns.  No s/s of infection.      Tx options reviewed  mFOLFOX6 q14 Days x 6 Months  A cycle is every 14 days:  Oxaliplatin 85 mg/m² IV once on day 1  Leucovorin 400 mg/m² IV once on day 1  Fluorouracil 400 mg/m² IV once on day 1  Fluorouracil 1,200 mg/m²/day CIV on days 1 and 2. Total dose = 2,400 mg/m²/cycle.  Or CapeOx q21 Days x 4 Cycles  A cycle is every 21 days:  Capecitabine (Xeloda) 850 mg/m² orally twice daily on days 1-14, then off 7 days  Oxaliplatin 130 mg/m² IV day 1  Or If MS Stable and Pre-existing Neuropathy or Performance Status > 1:  Capecitabine 1,000 mg/m² BID D1-14 q21 Days x 8 Cycles  Caution should be exerted with the use of oxaliplatin-based therapy vs fluoropyrimidine alone for elderly or frail patients, given the

## 2024-10-24 ENCOUNTER — HOSPITAL ENCOUNTER (OUTPATIENT)
Dept: LAB | Age: 79
Discharge: HOME | End: 2024-10-24
Payer: MEDICARE

## 2024-10-24 ENCOUNTER — OFFICE VISIT (OUTPATIENT)
Dept: ONCOLOGY | Age: 79
End: 2024-10-24

## 2024-10-24 VITALS
BODY MASS INDEX: 20.98 KG/M2 | DIASTOLIC BLOOD PRESSURE: 70 MMHG | SYSTOLIC BLOOD PRESSURE: 142 MMHG | OXYGEN SATURATION: 98 % | HEIGHT: 62 IN | HEART RATE: 64 BPM | RESPIRATION RATE: 16 BRPM | WEIGHT: 114 LBS | TEMPERATURE: 97.9 F

## 2024-10-24 DIAGNOSIS — Z71.89 GOALS OF CARE, COUNSELING/DISCUSSION: ICD-10-CM

## 2024-10-24 DIAGNOSIS — E55.9 VITAMIN D DEFICIENCY: ICD-10-CM

## 2024-10-24 DIAGNOSIS — C18.0 CECAL CANCER (HCC): ICD-10-CM

## 2024-10-24 DIAGNOSIS — R94.8 ABNORMAL POSITRON EMISSION TOMOGRAPHY (PET) SCAN: ICD-10-CM

## 2024-10-24 DIAGNOSIS — C18.0 CECAL CANCER (HCC): Primary | ICD-10-CM

## 2024-10-24 DIAGNOSIS — R93.2 ABNORMAL PET SCAN, LIVER: ICD-10-CM

## 2024-10-24 DIAGNOSIS — C18.2 MALIGNANT NEOPLASM OF ASCENDING COLON (HCC): ICD-10-CM

## 2024-10-24 LAB
25(OH)D3 SERPL-MCNC: 43.5 NG/ML (ref 30–100)
ALBUMIN SERPL-MCNC: 4.1 G/DL (ref 3.2–4.6)
ALBUMIN/GLOB SERPL: 1.2 (ref 1–1.9)
ALP SERPL-CCNC: 105 U/L (ref 35–104)
ALT SERPL-CCNC: 18 U/L (ref 8–45)
ANION GAP SERPL CALC-SCNC: 12 MMOL/L (ref 9–18)
AST SERPL-CCNC: 28 U/L (ref 15–37)
BASOPHILS # BLD: 0.1 K/UL (ref 0–0.2)
BASOPHILS NFR BLD: 1 % (ref 0–2)
BILIRUB SERPL-MCNC: 0.4 MG/DL (ref 0–1.2)
BUN SERPL-MCNC: 13 MG/DL (ref 8–23)
CALCIUM SERPL-MCNC: 9.9 MG/DL (ref 8.8–10.2)
CEA SERPL-MCNC: 9.9 NG/ML (ref 0–3.8)
CHLORIDE SERPL-SCNC: 103 MMOL/L (ref 98–107)
CO2 SERPL-SCNC: 25 MMOL/L (ref 20–28)
CREAT SERPL-MCNC: 0.79 MG/DL (ref 0.6–1.1)
DIFFERENTIAL METHOD BLD: NORMAL
EOSINOPHIL # BLD: 0.1 K/UL (ref 0–0.8)
EOSINOPHIL NFR BLD: 2 % (ref 0.5–7.8)
ERYTHROCYTE [DISTWIDTH] IN BLOOD BY AUTOMATED COUNT: 12.7 % (ref 11.9–14.6)
GLOBULIN SER CALC-MCNC: 3.4 G/DL (ref 2.3–3.5)
GLUCOSE SERPL-MCNC: 101 MG/DL (ref 70–99)
HCT VFR BLD AUTO: 40.8 % (ref 35.8–46.3)
HGB BLD-MCNC: 14 G/DL (ref 11.7–15.4)
IMM GRANULOCYTES # BLD AUTO: 0 K/UL (ref 0–0.5)
IMM GRANULOCYTES NFR BLD AUTO: 0 % (ref 0–5)
LYMPHOCYTES # BLD: 2.2 K/UL (ref 0.5–4.6)
LYMPHOCYTES NFR BLD: 29 % (ref 13–44)
MCH RBC QN AUTO: 32.6 PG (ref 26.1–32.9)
MCHC RBC AUTO-ENTMCNC: 34.3 G/DL (ref 31.4–35)
MCV RBC AUTO: 94.9 FL (ref 82–102)
MONOCYTES # BLD: 0.6 K/UL (ref 0.1–1.3)
MONOCYTES NFR BLD: 8 % (ref 4–12)
NEUTS SEG # BLD: 4.6 K/UL (ref 1.7–8.2)
NEUTS SEG NFR BLD: 60 % (ref 43–78)
NRBC # BLD: 0 K/UL (ref 0–0.2)
PLATELET # BLD AUTO: 296 K/UL (ref 150–450)
PMV BLD AUTO: 10.8 FL (ref 9.4–12.3)
POTASSIUM SERPL-SCNC: 4 MMOL/L (ref 3.5–5.1)
PROT SERPL-MCNC: 7.5 G/DL (ref 6.3–8.2)
RBC # BLD AUTO: 4.3 M/UL (ref 4.05–5.2)
SODIUM SERPL-SCNC: 140 MMOL/L (ref 136–145)
WBC # BLD AUTO: 7.7 K/UL (ref 4.3–11.1)

## 2024-10-24 PROCEDURE — 36415 COLL VENOUS BLD VENIPUNCTURE: CPT

## 2024-10-24 PROCEDURE — 80053 COMPREHEN METABOLIC PANEL: CPT

## 2024-10-24 PROCEDURE — 82306 VITAMIN D 25 HYDROXY: CPT

## 2024-10-24 PROCEDURE — 82378 CARCINOEMBRYONIC ANTIGEN: CPT

## 2024-10-24 PROCEDURE — 85025 COMPLETE CBC W/AUTO DIFF WBC: CPT

## 2024-10-24 ASSESSMENT — PATIENT HEALTH QUESTIONNAIRE - PHQ9
SUM OF ALL RESPONSES TO PHQ9 QUESTIONS 1 & 2: 0
SUM OF ALL RESPONSES TO PHQ QUESTIONS 1-9: 0
1. LITTLE INTEREST OR PLEASURE IN DOING THINGS: NOT AT ALL
SUM OF ALL RESPONSES TO PHQ QUESTIONS 1-9: 0
2. FEELING DOWN, DEPRESSED OR HOPELESS: NOT AT ALL

## 2024-10-24 NOTE — PATIENT INSTRUCTIONS
Patient Information from Today's Visit    The members of your [facility] are listed below:    Physician Provider: Tesha Johnson, Medical Oncologist  Advanced Practice Clinician: Mahnaz Ingram NP  Registered Nurse: Flora KRAFT RN  Navigator: Terra SHARP RN  Medical Assistant: Xin CARRILLO MA  : Bethany LE   Supportive Care Services: Ebonie JAY LMSW    Diagnosis: Cecal Cancer      Follow Up Instructions: 4-6 weeks virtual visit.    Labs reviewed.  Symptoms reviewed.  PET scan reviewed.  Recommend MRI abdomen liver protocol and MRI cervical spine.  You can call to schedule this at [phone].  Reviewed systemic treatment options for future if needed - capecitabine (oral chemo), cape-ox (oral and IV chemo), or FOLFOX (all IV chemo).  Referral to interventional radiology for biopsy of lymph node if they are able.  Discussed referral to radiation oncology with Dr. Villanueva after review of MRIs.  Recommend at least 2000 IU of Vitamin D daily.    Treatment Summary has been discussed and given to patient:N/A      Current Labs:   Hospital Outpatient Visit on 10/24/2024   Component Date Value Ref Range Status    WBC 10/24/2024 7.7  4.3 - 11.1 K/uL Final    RBC 10/24/2024 4.30  4.05 - 5.2 M/uL Final    Hemoglobin 10/24/2024 14.0  11.7 - 15.4 g/dL Final    Hematocrit 10/24/2024 40.8  35.8 - 46.3 % Final    MCV 10/24/2024 94.9  82.0 - 102.0 FL Final    MCH 10/24/2024 32.6  26.1 - 32.9 PG Final    MCHC 10/24/2024 34.3  31.4 - 35.0 g/dL Final    RDW 10/24/2024 12.7  11.9 - 14.6 % Final    Platelets 10/24/2024 296  150 - 450 K/uL Final    MPV 10/24/2024 10.8  9.4 - 12.3 FL Final    nRBC 10/24/2024 0.00  0.0 - 0.2 K/uL Final    **Note: Absolute NRBC parameter is now reported with Hemogram**    Differential Type 10/24/2024 AUTOMATED    Final    Neutrophils % 10/24/2024 60  43 - 78 % Final    Lymphocytes % 10/24/2024 29  13 - 44 % Final    Monocytes % 10/24/2024 8  4.0 - 12.0 % Final    Eosinophils % 10/24/2024

## 2024-10-27 PROBLEM — R94.5 ABNORMAL PET SCAN, LIVER: Status: ACTIVE | Noted: 2024-10-27

## 2024-10-27 PROBLEM — R94.8 ABNORMAL POSITRON EMISSION TOMOGRAPHY (PET) SCAN: Status: ACTIVE | Noted: 2024-10-27

## 2024-10-27 PROBLEM — E55.9 VITAMIN D DEFICIENCY: Status: ACTIVE | Noted: 2024-10-27

## 2024-10-27 PROBLEM — R93.2 ABNORMAL PET SCAN, LIVER: Status: ACTIVE | Noted: 2024-10-27

## 2024-10-28 DIAGNOSIS — C18.0 CECAL CANCER (HCC): Primary | ICD-10-CM

## 2024-10-28 DIAGNOSIS — R94.8 ABNORMAL POSITRON EMISSION TOMOGRAPHY (PET) SCAN: ICD-10-CM

## 2024-11-04 ENCOUNTER — HOSPITAL ENCOUNTER (OUTPATIENT)
Dept: ULTRASOUND IMAGING | Age: 79
Discharge: HOME | End: 2024-11-07
Attending: INTERNAL MEDICINE
Payer: MEDICARE

## 2024-11-04 VITALS
HEART RATE: 62 BPM | DIASTOLIC BLOOD PRESSURE: 61 MMHG | RESPIRATION RATE: 16 BRPM | TEMPERATURE: 98 F | WEIGHT: 114 LBS | HEIGHT: 62 IN | OXYGEN SATURATION: 97 % | SYSTOLIC BLOOD PRESSURE: 129 MMHG | BODY MASS INDEX: 20.98 KG/M2

## 2024-11-04 DIAGNOSIS — C18.0 CECAL CANCER (HCC): ICD-10-CM

## 2024-11-04 DIAGNOSIS — R94.8 ABNORMAL POSITRON EMISSION TOMOGRAPHY (PET) SCAN: ICD-10-CM

## 2024-11-04 PROCEDURE — 76942 ECHO GUIDE FOR BIOPSY: CPT

## 2024-11-04 PROCEDURE — 88305 TISSUE EXAM BY PATHOLOGIST: CPT

## 2024-11-04 PROCEDURE — 76942 ECHO GUIDE FOR BIOPSY: CPT | Performed by: RADIOLOGY

## 2024-11-04 PROCEDURE — 2500000003 HC RX 250 WO HCPCS: Performed by: RADIOLOGY

## 2024-11-04 PROCEDURE — 38505 NEEDLE BIOPSY LYMPH NODES: CPT | Performed by: RADIOLOGY

## 2024-11-04 RX ORDER — LIDOCAINE HYDROCHLORIDE 20 MG/ML
INJECTION, SOLUTION INFILTRATION; PERINEURAL PRN
Status: COMPLETED | OUTPATIENT
Start: 2024-11-04 | End: 2024-11-04

## 2024-11-04 RX ADMIN — LIDOCAINE HYDROCHLORIDE 5 ML: 20 INJECTION, SOLUTION INFILTRATION; PERINEURAL at 10:42

## 2024-11-04 NOTE — BRIEF OP NOTE
[facility]  Department of Interventional Radiology  [phone]        Brief Procedure Note    Patient: Haven Hui MRN: [id number]  SSN: [id number]    YOB: 1945  Age: 79 y.o.  Sex: female      Date of Procedure: 11/4/2024     Pre-Procedure Diagnosis:   Colon cancer.  Enlarged cervical Lns.     Post-Procedure Diagnosis:  SAME    Procedure(s):  Image Guided Biopsy    Brief Description of Procedure:  L Retroclavicular LN.     Performed By:  MARLA SOTO MD     Assistants:  None    Anesthesia:  Lidocaine    Estimated Blood Loss:  Less than 10ml    Specimens:  Pathology    Implants:  None    Findings:  Irregularly bordered, enlarged, L Retroclavicular LN.      Complications:  None    Recommendations:  Resume Xeralto Wednesday.  Pressure dressing to L Neck.  Ice pack to neck, if needed.       Follow Up:  Dr Johnson.      Signed By: MARLA SOTO MD     November 4, 2024

## 2024-11-04 NOTE — DISCHARGE INSTRUCTIONS
If you have any questions about your procedure, please call the Interventional Radiology department at [phone].     After business hours (5pm) and weekends, call the answering service at [phone] and ask for the Interventional Radiologist on call to be paged.       Please start blood thinner back tomorrow.

## 2024-11-04 NOTE — OR NURSING
Pt alert and oriented and denies pain. Dressing is clean, dry, and intact. Reviewed discharge instructions with patient and who verbalized understanding. Pt ambulatory off the unit with no distress observed.

## 2024-11-08 DIAGNOSIS — E03.9 HYPOTHYROIDISM, UNSPECIFIED TYPE: ICD-10-CM

## 2024-11-08 LAB — TSH, 3RD GENERATION: 1.35 UIU/ML (ref 0.27–4.2)

## 2024-11-11 ENCOUNTER — PATIENT MESSAGE (OUTPATIENT)
Dept: PRIMARY CARE CLINIC | Facility: CLINIC | Age: 79
End: 2024-11-11

## 2024-11-13 ENCOUNTER — TELEMEDICINE (OUTPATIENT)
Dept: PRIMARY CARE CLINIC | Facility: CLINIC | Age: 79
End: 2024-11-13

## 2024-11-13 DIAGNOSIS — C18.9 MALIGNANT NEOPLASM OF COLON, UNSPECIFIED PART OF COLON (HCC): ICD-10-CM

## 2024-11-13 DIAGNOSIS — E03.9 HYPOTHYROIDISM, UNSPECIFIED TYPE: Primary | ICD-10-CM

## 2024-11-13 RX ORDER — LEVOTHYROXINE SODIUM 50 UG/1
50 TABLET ORAL DAILY
Qty: 90 TABLET | Refills: 1 | Status: SHIPPED | OUTPATIENT
Start: 2024-11-13

## 2024-11-13 ASSESSMENT — ENCOUNTER SYMPTOMS
BACK PAIN: 1
ABDOMINAL PAIN: 0

## 2024-11-13 NOTE — PROGRESS NOTES
FOLLOW UP VISIT    Subjective:    Haven Hui (: 1945) is a 79 y.o., female,   Chief Complaint   Patient presents with    Medication Problem     50 mg to 75 mg synthroid       HPI:  Very nice 79 year old in for follow up of thyroid TSH normal  She has Hashimoto's.   She has had  a COLO  today .  She saw Dr. Hernandez.    She is going to Onc in  is going to do Surveillance     1. Anemia- has seen GI has Colon Ca  - has cecal cancer   2. Finger pain is improved    3. Inguinal Hernia- on CT   4. Diverticulosis stable  5. Renal lesion- US normal no follow up needed  6. Pancreatic Cyst stable from 2017 repeat CT done  7.         Thyroid d/o on meds TSH last check 6  8.          Back pain  9.          HCM UTD due for AWV and vaccines  10.        PE on Xarelto          Back Pain  This is a new problem. The current episode started 1 to 4 weeks ago. The problem occurs daily. The problem has been gradually worsening since onset. The pain is present in the lumbar spine. The quality of the pain is described as aching. Pertinent negatives include no abdominal pain, bladder incontinence, chest pain, fever, paresis or perianal numbness.         The following portions of the patient's history were reviewed and updated as appropriate:      Past Medical History:   Diagnosis Date    Anemia     Rx iron    Cecal cancer (HCC)     History of pulmonary embolism     on Xarelto    Hypothyroidism     managed with medication    Malignant neoplasm of ascending colon (HCC)     Primary colon cancer with metastasis to 7 or more regional lymph nodes (N2b) (HCC)        Past Surgical History:   Procedure Laterality Date    APPENDECTOMY  11/15/23    CATARACT REMOVAL Bilateral     COLONOSCOPY N/A 2024    COLORECTAL CANCER SCREENING, NOT HIGH RISK performed by Saul Kc MD at [facility] ENDOSCOPY    HERNIA REPAIR Right 2023    LAPAROSCOPIC RIGHT INGUINAL HERNIA REPAIR WITH MESH performed by Romel Ricsk MD at [facility]

## 2024-11-25 ENCOUNTER — CLINICAL DOCUMENTATION (OUTPATIENT)
Dept: ONCOLOGY | Age: 79
End: 2024-11-25

## 2024-11-25 ENCOUNTER — TELEPHONE (OUTPATIENT)
Dept: ONCOLOGY | Age: 79
End: 2024-11-25

## 2024-11-25 DIAGNOSIS — R93.7 ABNORMAL MRI, CERVICAL SPINE: ICD-10-CM

## 2024-11-25 DIAGNOSIS — R94.8 ABNORMAL POSITRON EMISSION TOMOGRAPHY (PET) SCAN: ICD-10-CM

## 2024-11-25 DIAGNOSIS — C18.0 CECAL CANCER (HCC): Primary | ICD-10-CM

## 2024-11-25 DIAGNOSIS — H53.8 BLURRY VISION: ICD-10-CM

## 2024-11-25 NOTE — PROGRESS NOTES
Called the patient to review her recent MRI results.  Cervical spine confirmed a lesion in C7 vertebra with involvement of the right pedicle and lateral mass and with cortical breakthrough along the right anterior lateral margin with a small paravertebral soft tissue extension.  Patient is not currently symptomatic.  She has some aches across her shoulders but thinks these are unrelated.  She did have a period of numbness down the back of right arm a couple of wks ago - this is mostly resolved.  No other neurological s/s.  Dr Abdi/XRT will review pt's images.  She will call to schedule MR brain to complete staging - No HAs.    She appreciated the call and qs were answered.

## 2024-11-25 NOTE — TELEPHONE ENCOUNTER
Call to patient to review pathology results from lymph node bx after discussion with MD.  Path is c/w metastatic adenocarcinoma and we will send tissue off for NGS testing.  Pt VU and appreciated the call.  Confirmed upcoming VV for next week.

## 2024-11-26 ENCOUNTER — CLINICAL DOCUMENTATION (OUTPATIENT)
Dept: ONCOLOGY | Age: 79
End: 2024-11-26

## 2024-11-26 NOTE — PROGRESS NOTES
Attempted to call pt to discuss her imaging.  No answer.    Reviewed imaging with XRT, Dr Abdi and no current indication to tx.  May need tx if becomes symptomatic with pain/neuro symptoms, which pt did not report acutely.

## 2024-11-26 NOTE — PROGRESS NOTES
injection 5-40 mL  5-40 mL IntraVENous 2 times per day Darron Wilson MD   10 mL at 12/11/24 0821    sodium chloride flush 0.9 % injection 5-40 mL  5-40 mL IntraVENous PRN Darron Wilson MD        0.9 % sodium chloride infusion   IntraVENous PRN Darron Wilson MD        potassium chloride (KLOR-CON M) extended release tablet 40 mEq  40 mEq Oral PRN Darron Wilson MD        Or    potassium bicarb-citric acid (EFFER-K) effervescent tablet 40 mEq  40 mEq Oral PRN Darron Wilson MD        Or    potassium chloride 10 mEq/100 mL IVPB (Peripheral Line)  10 mEq IntraVENous PRN Darron Wilson MD        magnesium sulfate 2000 mg in 50 mL IVPB premix  2,000 mg IntraVENous PRN Darron Wilson MD        ondansetron (ZOFRAN-ODT) disintegrating tablet 4 mg  4 mg Oral Q8H PRN Darron Wilson MD        Or    ondansetron (ZOFRAN) injection 4 mg  4 mg IntraVENous Q6H PRN Darron Wilson MD   4 mg at 12/06/24 2329    polyethylene glycol (GLYCOLAX) packet 17 g  17 g Oral Daily PRN Darron Wilson MD   17 g at 12/10/24 1138    acetaminophen (TYLENOL) tablet 650 mg  650 mg Oral Q6H PRN Darron Wilson MD   650 mg at 12/08/24 2203    hydrALAZINE (APRESOLINE) injection 10 mg  10 mg IntraVENous Q6H PRN Darron Wilson MD   10 mg at 12/06/24 2251    dicyclomine (BENTYL) capsule 10 mg  10 mg Oral 4x Daily PRN Darron Wilson MD        levETIRAcetam (KEPPRA) injection 500 mg  500 mg IntraVENous Q12H Luis Alfredo Tran MD   500 mg at 12/11/24 1228     OBJECTIVE:  There were no vitals taken for this visit.    ECOG PERFORMANCE STATUS - 1- Restricted in physically strenuous activity but ambulatory and able to carry out work of a light or sedentary nature such as light house work, office work.   Pain - /10. None/Minimal pain - not affecting QOL     Physical Exam  Exam conducted with a chaperone present.   Constitutional:       General: She is not in acute distress.  Pulmonary:      Effort: Pulmonary effort is normal. No

## 2024-12-04 ENCOUNTER — TELEMEDICINE (OUTPATIENT)
Dept: ONCOLOGY | Age: 79
End: 2024-12-04
Payer: MEDICARE

## 2024-12-04 DIAGNOSIS — Z79.899 HIGH RISK MEDICATION USE: ICD-10-CM

## 2024-12-04 DIAGNOSIS — C18.0 CECAL CANCER (HCC): ICD-10-CM

## 2024-12-04 DIAGNOSIS — H53.9 VISION CHANGES: ICD-10-CM

## 2024-12-04 DIAGNOSIS — G89.3 CANCER RELATED PAIN: ICD-10-CM

## 2024-12-04 DIAGNOSIS — C18.2 MALIGNANT NEOPLASM OF ASCENDING COLON (HCC): ICD-10-CM

## 2024-12-04 DIAGNOSIS — R94.8 ABNORMAL POSITRON EMISSION TOMOGRAPHY (PET) SCAN: ICD-10-CM

## 2024-12-04 DIAGNOSIS — C79.51 METASTASIS TO BONE (HCC): ICD-10-CM

## 2024-12-04 DIAGNOSIS — C79.9 METASTATIC ADENOCARCINOMA (HCC): Primary | ICD-10-CM

## 2024-12-04 PROCEDURE — G8399 PT W/DXA RESULTS DOCUMENT: HCPCS | Performed by: INTERNAL MEDICINE

## 2024-12-04 PROCEDURE — 1090F PRES/ABSN URINE INCON ASSESS: CPT | Performed by: INTERNAL MEDICINE

## 2024-12-04 PROCEDURE — G8427 DOCREV CUR MEDS BY ELIG CLIN: HCPCS | Performed by: INTERNAL MEDICINE

## 2024-12-04 PROCEDURE — 1123F ACP DISCUSS/DSCN MKR DOCD: CPT | Performed by: INTERNAL MEDICINE

## 2024-12-04 PROCEDURE — 99214 OFFICE O/P EST MOD 30 MIN: CPT | Performed by: INTERNAL MEDICINE

## 2024-12-04 RX ORDER — CAPECITABINE 500 MG/1
1000 TABLET, FILM COATED ORAL 2 TIMES DAILY
Qty: 84 TABLET | Refills: 0 | Status: ACTIVE | OUTPATIENT
Start: 2024-12-04 | End: 2024-12-18

## 2024-12-04 RX ORDER — UREA 40 %
CREAM (GRAM) TOPICAL
Qty: 198 G | Refills: 0 | Status: SHIPPED | OUTPATIENT
Start: 2024-12-04

## 2024-12-04 ASSESSMENT — PATIENT HEALTH QUESTIONNAIRE - PHQ9
SUM OF ALL RESPONSES TO PHQ9 QUESTIONS 1 & 2: 0
SUM OF ALL RESPONSES TO PHQ QUESTIONS 1-9: 0
SUM OF ALL RESPONSES TO PHQ QUESTIONS 1-9: 0
1. LITTLE INTEREST OR PLEASURE IN DOING THINGS: NOT AT ALL
SUM OF ALL RESPONSES TO PHQ QUESTIONS 1-9: 0
2. FEELING DOWN, DEPRESSED OR HOPELESS: NOT AT ALL
SUM OF ALL RESPONSES TO PHQ QUESTIONS 1-9: 0

## 2024-12-04 NOTE — PATIENT INSTRUCTIONS
Patient Information from Today's Visit    The members of your [facility] are listed below:    Physician Provider: Tesha Johnson, Medical Oncologist  Advanced Practice Clinician: Mahnaz Ingram NP  Registered Nurse: Flora KRAFT RN  Navigator: Terra SHARP RN  Medical Assistant: Xin CARRILLO MA  : Bethany LE   Supportive Care Services: Ebonie JAY LMSW    Diagnosis: Metastatic Adenocarcinoma       Follow Up Instructions: After consultation with radiation.    Pathology discussed.  Referral to Dr. Abdi for radiation oncology consultation.  NGS testing is pending.  Treatment options and side effects discussed - FOLFOX versus capecitabine.  You have elected capecitabine at this time.  We will send the prescription to specialty pharmacy and they will contact you regarding setting up shipment.  Let us know once you receive the medications in the mail and we will get you set up for appropriate appointments/lab checks.  Do not take capecitabine during radiation.  Urea cream prescription sent.  Recommend next restaging in 3-4 months.  You can call to schedule this at [phone].    Treatment Summary has been discussed and given to patient:N/A      Current Labs:   Orders Only on 11/08/2024   Component Date Value Ref Range Status    TSH, 3rd Generation 11/08/2024 1.350  0.270 - 4.200 uIU/mL Final   Hospital Outpatient Visit on 10/24/2024   Component Date Value Ref Range Status    WBC 10/24/2024 7.7  4.3 - 11.1 K/uL Final    RBC 10/24/2024 4.30  4.05 - 5.2 M/uL Final    Hemoglobin 10/24/2024 14.0  11.7 - 15.4 g/dL Final    Hematocrit 10/24/2024 40.8  35.8 - 46.3 % Final    MCV 10/24/2024 94.9  82.0 - 102.0 FL Final    MCH 10/24/2024 32.6  26.1 - 32.9 PG Final    MCHC 10/24/2024 34.3  31.4 - 35.0 g/dL Final    RDW 10/24/2024 12.7  11.9 - 14.6 % Final    Platelets 10/24/2024 296  150 - 450 K/uL Final    MPV 10/24/2024 10.8  9.4 - 12.3 FL Final    nRBC 10/24/2024 0.00  0.0 - 0.2 K/uL Final    **Note:

## 2024-12-05 ENCOUNTER — HOSPITAL ENCOUNTER (OUTPATIENT)
Age: 79
Discharge: HOME | End: 2024-12-07
Payer: MEDICARE

## 2024-12-05 ENCOUNTER — TELEPHONE (OUTPATIENT)
Dept: ONCOLOGY | Age: 79
End: 2024-12-05

## 2024-12-05 DIAGNOSIS — C79.9 METASTATIC ADENOCARCINOMA (HCC): ICD-10-CM

## 2024-12-05 DIAGNOSIS — H53.9 VISION CHANGES: ICD-10-CM

## 2024-12-05 DIAGNOSIS — R94.8 ABNORMAL POSITRON EMISSION TOMOGRAPHY (PET) SCAN: ICD-10-CM

## 2024-12-05 PROCEDURE — 70553 MRI BRAIN STEM W/O & W/DYE: CPT

## 2024-12-05 PROCEDURE — 6360000004 HC RX CONTRAST MEDICATION: Performed by: INTERNAL MEDICINE

## 2024-12-05 PROCEDURE — A9579 GAD-BASE MR CONTRAST NOS,1ML: HCPCS | Performed by: INTERNAL MEDICINE

## 2024-12-05 RX ADMIN — GADOTERIDOL 10 ML: 279.3 INJECTION, SOLUTION INTRAVENOUS at 17:42

## 2024-12-05 NOTE — TELEPHONE ENCOUNTER
Physician provider: Dr. Johnson  Reason for today's call (Please detail here patients chief complaint): medication clarification Last office visit:n/a  Patient Callback Number: [phone]  Was callback number verified?: Yes  Preferred pharmacy (If refill request): [facility]   Calls to office within the last 48 hours?:No    Patient notified that their information will be routed to the [facility] clinical triage team for review. Patient is advised that they will receive a phone call from the triage department. If symptom related and symptoms worsen before receiving a call back, the patient has been advised to proceed to the nearest ED.     Pharmacist needs clarification on cycle for Xeloda.    +    Needs pt's height & wt and clinical notes faxed to     [phone]     # [phone]    Sebastian

## 2024-12-06 ENCOUNTER — APPOINTMENT (OUTPATIENT)
Dept: CT IMAGING | Age: 79
DRG: 081 | End: 2024-12-06
Payer: MEDICARE

## 2024-12-06 ENCOUNTER — CLINICAL DOCUMENTATION (OUTPATIENT)
Dept: ONCOLOGY | Age: 79
End: 2024-12-06

## 2024-12-06 ENCOUNTER — HOSPITAL ENCOUNTER (INPATIENT)
Age: 79
LOS: 8 days | Discharge: HOME HEALTH | DRG: 081 | End: 2024-12-14
Attending: STUDENT IN AN ORGANIZED HEALTH CARE EDUCATION/TRAINING PROGRAM | Admitting: STUDENT IN AN ORGANIZED HEALTH CARE EDUCATION/TRAINING PROGRAM
Payer: MEDICARE

## 2024-12-06 ENCOUNTER — TELEPHONE (OUTPATIENT)
Dept: ONCOLOGY | Age: 79
End: 2024-12-06

## 2024-12-06 DIAGNOSIS — C79.9 METASTATIC ADENOCARCINOMA (HCC): ICD-10-CM

## 2024-12-06 DIAGNOSIS — C79.31 METASTATIC ADENOCARCINOMA TO BRAIN (HCC): Primary | ICD-10-CM

## 2024-12-06 DIAGNOSIS — R45.89 ANXIETY ABOUT TREATMENT: ICD-10-CM

## 2024-12-06 DIAGNOSIS — C79.31 METASTASIS TO BRAIN (HCC): Primary | ICD-10-CM

## 2024-12-06 DIAGNOSIS — G47.00 INSOMNIA, UNSPECIFIED TYPE: ICD-10-CM

## 2024-12-06 PROBLEM — E03.9 HYPOTHYROIDISM: Status: ACTIVE | Noted: 2024-12-06

## 2024-12-06 PROBLEM — G93.6 VASOGENIC EDEMA (HCC): Status: ACTIVE | Noted: 2024-12-06

## 2024-12-06 LAB
ALBUMIN SERPL-MCNC: 4 G/DL (ref 3.2–4.6)
ALBUMIN/GLOB SERPL: 1 (ref 1–1.9)
ALP SERPL-CCNC: 119 U/L (ref 35–104)
ALT SERPL-CCNC: 15 U/L (ref 8–45)
ANION GAP SERPL CALC-SCNC: 14 MMOL/L (ref 7–16)
AST SERPL-CCNC: 23 U/L (ref 15–37)
BASOPHILS # BLD: 0.1 K/UL (ref 0–0.2)
BASOPHILS NFR BLD: 1 % (ref 0–2)
BILIRUB SERPL-MCNC: 0.5 MG/DL (ref 0–1.2)
BUN SERPL-MCNC: 10 MG/DL (ref 8–23)
CALCIUM SERPL-MCNC: 9.8 MG/DL (ref 8.8–10.2)
CHLORIDE SERPL-SCNC: 104 MMOL/L (ref 98–107)
CO2 SERPL-SCNC: 25 MMOL/L (ref 20–29)
CREAT SERPL-MCNC: 0.68 MG/DL (ref 0.6–1.1)
DIFFERENTIAL METHOD BLD: ABNORMAL
EKG ATRIAL RATE: 62 BPM
EKG DIAGNOSIS: NORMAL
EKG P AXIS: 82 DEGREES
EKG P-R INTERVAL: 171 MS
EKG Q-T INTERVAL: 427 MS
EKG QRS DURATION: 90 MS
EKG QTC CALCULATION (BAZETT): 431 MS
EKG R AXIS: 83 DEGREES
EKG T AXIS: 60 DEGREES
EKG VENTRICULAR RATE: 61 BPM
EOSINOPHIL # BLD: 0 K/UL (ref 0–0.8)
EOSINOPHIL NFR BLD: 0 % (ref 0.5–7.8)
ERYTHROCYTE [DISTWIDTH] IN BLOOD BY AUTOMATED COUNT: 12.7 % (ref 11.9–14.6)
GLOBULIN SER CALC-MCNC: 4 G/DL (ref 2.3–3.5)
GLUCOSE SERPL-MCNC: 139 MG/DL (ref 70–99)
HCT VFR BLD AUTO: 43.6 % (ref 35.8–46.3)
HGB BLD-MCNC: 14.9 G/DL (ref 11.7–15.4)
IMM GRANULOCYTES # BLD AUTO: 0.1 K/UL (ref 0–0.5)
IMM GRANULOCYTES NFR BLD AUTO: 1 % (ref 0–5)
LYMPHOCYTES # BLD: 1.2 K/UL (ref 0.5–4.6)
LYMPHOCYTES NFR BLD: 11 % (ref 13–44)
MAGNESIUM SERPL-MCNC: 2.2 MG/DL (ref 1.8–2.4)
MCH RBC QN AUTO: 32.7 PG (ref 26.1–32.9)
MCHC RBC AUTO-ENTMCNC: 34.2 G/DL (ref 31.4–35)
MCV RBC AUTO: 95.6 FL (ref 82–102)
MONOCYTES # BLD: 0.6 K/UL (ref 0.1–1.3)
MONOCYTES NFR BLD: 5 % (ref 4–12)
NEUTS SEG # BLD: 8.7 K/UL (ref 1.7–8.2)
NEUTS SEG NFR BLD: 82 % (ref 43–78)
NRBC # BLD: 0 K/UL (ref 0–0.2)
PLATELET # BLD AUTO: 338 K/UL (ref 150–450)
PMV BLD AUTO: 10.7 FL (ref 9.4–12.3)
POTASSIUM SERPL-SCNC: 3.7 MMOL/L (ref 3.5–5.1)
PROT SERPL-MCNC: 8 G/DL (ref 6.3–8.2)
RBC # BLD AUTO: 4.56 M/UL (ref 4.05–5.2)
SODIUM SERPL-SCNC: 143 MMOL/L (ref 136–145)
WBC # BLD AUTO: 10.6 K/UL (ref 4.3–11.1)

## 2024-12-06 PROCEDURE — 83735 ASSAY OF MAGNESIUM: CPT

## 2024-12-06 PROCEDURE — 96374 THER/PROPH/DIAG INJ IV PUSH: CPT

## 2024-12-06 PROCEDURE — 2580000003 HC RX 258: Performed by: STUDENT IN AN ORGANIZED HEALTH CARE EDUCATION/TRAINING PROGRAM

## 2024-12-06 PROCEDURE — 70450 CT HEAD/BRAIN W/O DYE: CPT

## 2024-12-06 PROCEDURE — 80053 COMPREHEN METABOLIC PANEL: CPT

## 2024-12-06 PROCEDURE — 2000000000 HC ICU R&B

## 2024-12-06 PROCEDURE — 93005 ELECTROCARDIOGRAM TRACING: CPT | Performed by: STUDENT IN AN ORGANIZED HEALTH CARE EDUCATION/TRAINING PROGRAM

## 2024-12-06 PROCEDURE — 93010 ELECTROCARDIOGRAM REPORT: CPT | Performed by: INTERNAL MEDICINE

## 2024-12-06 PROCEDURE — 99221 1ST HOSP IP/OBS SF/LOW 40: CPT | Performed by: STUDENT IN AN ORGANIZED HEALTH CARE EDUCATION/TRAINING PROGRAM

## 2024-12-06 PROCEDURE — 99285 EMERGENCY DEPT VISIT HI MDM: CPT

## 2024-12-06 PROCEDURE — 6360000002 HC RX W HCPCS: Performed by: STUDENT IN AN ORGANIZED HEALTH CARE EDUCATION/TRAINING PROGRAM

## 2024-12-06 PROCEDURE — 85025 COMPLETE CBC W/AUTO DIFF WBC: CPT

## 2024-12-06 RX ORDER — SODIUM CHLORIDE 9 MG/ML
INJECTION, SOLUTION INTRAVENOUS PRN
Status: DISCONTINUED | OUTPATIENT
Start: 2024-12-06 | End: 2024-12-14 | Stop reason: HOSPADM

## 2024-12-06 RX ORDER — ONDANSETRON 2 MG/ML
4 INJECTION INTRAMUSCULAR; INTRAVENOUS EVERY 6 HOURS PRN
Status: DISCONTINUED | OUTPATIENT
Start: 2024-12-06 | End: 2024-12-14 | Stop reason: HOSPADM

## 2024-12-06 RX ORDER — MAGNESIUM SULFATE IN WATER 40 MG/ML
2000 INJECTION, SOLUTION INTRAVENOUS PRN
Status: DISCONTINUED | OUTPATIENT
Start: 2024-12-06 | End: 2024-12-14 | Stop reason: HOSPADM

## 2024-12-06 RX ORDER — LEVOTHYROXINE SODIUM 50 UG/1
50 TABLET ORAL DAILY
Status: DISCONTINUED | OUTPATIENT
Start: 2024-12-07 | End: 2024-12-06 | Stop reason: SDDI

## 2024-12-06 RX ORDER — SODIUM CHLORIDE 0.9 % (FLUSH) 0.9 %
5-40 SYRINGE (ML) INJECTION EVERY 12 HOURS SCHEDULED
Status: DISCONTINUED | OUTPATIENT
Start: 2024-12-06 | End: 2024-12-14 | Stop reason: HOSPADM

## 2024-12-06 RX ORDER — DEXAMETHASONE SODIUM PHOSPHATE 10 MG/ML
10 INJECTION INTRAMUSCULAR; INTRAVENOUS ONCE
Status: COMPLETED | OUTPATIENT
Start: 2024-12-06 | End: 2024-12-06

## 2024-12-06 RX ORDER — DICYCLOMINE HYDROCHLORIDE 10 MG/1
10 CAPSULE ORAL 4 TIMES DAILY PRN
Status: DISCONTINUED | OUTPATIENT
Start: 2024-12-06 | End: 2024-12-14 | Stop reason: HOSPADM

## 2024-12-06 RX ORDER — MULTIVIT WITH MINERALS/LUTEIN
250 TABLET ORAL DAILY
Status: DISCONTINUED | OUTPATIENT
Start: 2024-12-07 | End: 2024-12-14 | Stop reason: HOSPADM

## 2024-12-06 RX ORDER — POLYETHYLENE GLYCOL 3350 17 G/17G
17 POWDER, FOR SOLUTION ORAL DAILY PRN
Status: DISCONTINUED | OUTPATIENT
Start: 2024-12-06 | End: 2024-12-14 | Stop reason: HOSPADM

## 2024-12-06 RX ORDER — POTASSIUM CHLORIDE 7.45 MG/ML
10 INJECTION INTRAVENOUS PRN
Status: DISCONTINUED | OUTPATIENT
Start: 2024-12-06 | End: 2024-12-14 | Stop reason: HOSPADM

## 2024-12-06 RX ORDER — HYDRALAZINE HYDROCHLORIDE 20 MG/ML
10 INJECTION INTRAMUSCULAR; INTRAVENOUS EVERY 6 HOURS PRN
Status: DISCONTINUED | OUTPATIENT
Start: 2024-12-06 | End: 2024-12-14 | Stop reason: HOSPADM

## 2024-12-06 RX ORDER — ONDANSETRON 4 MG/1
4 TABLET, ORALLY DISINTEGRATING ORAL EVERY 8 HOURS PRN
Status: DISCONTINUED | OUTPATIENT
Start: 2024-12-06 | End: 2024-12-14 | Stop reason: HOSPADM

## 2024-12-06 RX ORDER — SODIUM CHLORIDE 0.9 % (FLUSH) 0.9 %
5-40 SYRINGE (ML) INJECTION PRN
Status: DISCONTINUED | OUTPATIENT
Start: 2024-12-06 | End: 2024-12-14 | Stop reason: HOSPADM

## 2024-12-06 RX ORDER — ACETAMINOPHEN 650 MG/1
650 SUPPOSITORY RECTAL EVERY 6 HOURS PRN
Status: DISCONTINUED | OUTPATIENT
Start: 2024-12-06 | End: 2024-12-10

## 2024-12-06 RX ORDER — POTASSIUM CHLORIDE 1500 MG/1
40 TABLET, EXTENDED RELEASE ORAL PRN
Status: DISCONTINUED | OUTPATIENT
Start: 2024-12-06 | End: 2024-12-14 | Stop reason: HOSPADM

## 2024-12-06 RX ORDER — BACLOFEN 10 MG/1
10 TABLET ORAL NIGHTLY PRN
Status: DISCONTINUED | OUTPATIENT
Start: 2024-12-06 | End: 2024-12-14 | Stop reason: HOSPADM

## 2024-12-06 RX ORDER — DICYCLOMINE HYDROCHLORIDE 10 MG/1
10 CAPSULE ORAL
Status: DISCONTINUED | OUTPATIENT
Start: 2024-12-06 | End: 2024-12-06

## 2024-12-06 RX ORDER — MORPHINE SULFATE 2 MG/ML
2 INJECTION, SOLUTION INTRAMUSCULAR; INTRAVENOUS
Status: DISCONTINUED | OUTPATIENT
Start: 2024-12-06 | End: 2024-12-11

## 2024-12-06 RX ORDER — ACETAMINOPHEN 325 MG/1
650 TABLET ORAL EVERY 6 HOURS PRN
Status: DISCONTINUED | OUTPATIENT
Start: 2024-12-06 | End: 2024-12-14 | Stop reason: HOSPADM

## 2024-12-06 RX ORDER — CAPECITABINE 500 MG/1
1000 TABLET, FILM COATED ORAL
Status: DISCONTINUED | OUTPATIENT
Start: 2024-12-07 | End: 2024-12-09

## 2024-12-06 RX ORDER — LEVETIRACETAM 500 MG/5ML
500 INJECTION, SOLUTION, CONCENTRATE INTRAVENOUS EVERY 12 HOURS
Status: DISCONTINUED | OUTPATIENT
Start: 2024-12-07 | End: 2024-12-13

## 2024-12-06 RX ORDER — DEXAMETHASONE SODIUM PHOSPHATE 4 MG/ML
4 INJECTION, SOLUTION INTRA-ARTICULAR; INTRALESIONAL; INTRAMUSCULAR; INTRAVENOUS; SOFT TISSUE EVERY 6 HOURS
Status: DISCONTINUED | OUTPATIENT
Start: 2024-12-06 | End: 2024-12-07

## 2024-12-06 RX ADMIN — DEXAMETHASONE SODIUM PHOSPHATE 10 MG: 10 INJECTION INTRAMUSCULAR; INTRAVENOUS at 19:47

## 2024-12-06 RX ADMIN — MORPHINE SULFATE 2 MG: 2 INJECTION, SOLUTION INTRAMUSCULAR; INTRAVENOUS at 23:07

## 2024-12-06 RX ADMIN — HYDRALAZINE HYDROCHLORIDE 10 MG: 20 INJECTION INTRAMUSCULAR; INTRAVENOUS at 22:51

## 2024-12-06 RX ADMIN — DEXAMETHASONE SODIUM PHOSPHATE 4 MG: 4 INJECTION INTRA-ARTICULAR; INTRALESIONAL; INTRAMUSCULAR; INTRAVENOUS; SOFT TISSUE at 22:52

## 2024-12-06 RX ADMIN — ONDANSETRON 4 MG: 2 INJECTION INTRAMUSCULAR; INTRAVENOUS at 23:29

## 2024-12-06 RX ADMIN — SODIUM CHLORIDE, PRESERVATIVE FREE 10 ML: 5 INJECTION INTRAVENOUS at 22:56

## 2024-12-06 ASSESSMENT — PAIN SCALES - GENERAL
PAINLEVEL_OUTOF10: 6
PAINLEVEL_OUTOF10: 5
PAINLEVEL_OUTOF10: 3
PAINLEVEL_OUTOF10: 5

## 2024-12-06 ASSESSMENT — PAIN DESCRIPTION - ONSET: ONSET: GRADUAL

## 2024-12-06 ASSESSMENT — PAIN DESCRIPTION - DESCRIPTORS
DESCRIPTORS: ACHING
DESCRIPTORS: ACHING

## 2024-12-06 ASSESSMENT — PAIN DESCRIPTION - LOCATION
LOCATION: HEAD
LOCATION: HEAD

## 2024-12-06 ASSESSMENT — PAIN DESCRIPTION - FREQUENCY: FREQUENCY: INTERMITTENT

## 2024-12-06 ASSESSMENT — LIFESTYLE VARIABLES
HOW OFTEN DO YOU HAVE A DRINK CONTAINING ALCOHOL: NEVER
HOW MANY STANDARD DRINKS CONTAINING ALCOHOL DO YOU HAVE ON A TYPICAL DAY: PATIENT DOES NOT DRINK

## 2024-12-06 ASSESSMENT — PAIN DESCRIPTION - ORIENTATION: ORIENTATION: MID

## 2024-12-06 ASSESSMENT — PAIN - FUNCTIONAL ASSESSMENT
PAIN_FUNCTIONAL_ASSESSMENT: ACTIVITIES ARE NOT PREVENTED
PAIN_FUNCTIONAL_ASSESSMENT: NONE - DENIES PAIN

## 2024-12-06 ASSESSMENT — PAIN DESCRIPTION - PAIN TYPE: TYPE: ACUTE PAIN

## 2024-12-06 NOTE — TELEPHONE ENCOUNTER
Call to [facility] and spoke with Syd in pharmacy.  Confirmed 21 day cycle for Xeloda prescription.  Also confirmed height and weight and no additional information needed at this time per Syd.

## 2024-12-06 NOTE — PROGRESS NOTES
Call to patient unable to reach.  Called to daughter, Sara who is one of the POA's.  MRI with multiple brain masses with vasogenic edema and 5 mm midline shift.  Case discussed with Dr. Villanueva as well as Dr. Lawler from neurosurgery.  I recommend the patient be sent to the emergency room but I am unable to reach her.  Patient's daughter will try to reach her/wellness check.  Called to the emergency room, spoke with Vivian to give report.  Pt will need to be started on Decadron.

## 2024-12-06 NOTE — TELEPHONE ENCOUNTER
Physician provider: Dr. Johnson  Reason for today's call (Please detail here patients chief complaint): height and weight  Last office visit:n/a  Patient Callback Number: [phone] ext [phone]  Was callback number verified?: Yes  Preferred pharmacy (If refill request): [facility]  Calls to office within the last 48 hours?:No    Patient notified that their information will be routed to the [facility] clinical triage team for review. Patient is advised that they will receive a phone call from the triage department. If symptom related and symptoms worsen before receiving a call back, the patient has been advised to proceed to the nearest ED.          Asha from [facility] need height and weight for the prescription that was sent on 12/04    [phone] ext [phone]

## 2024-12-07 ENCOUNTER — APPOINTMENT (OUTPATIENT)
Dept: CT IMAGING | Age: 79
DRG: 081 | End: 2024-12-07
Payer: MEDICARE

## 2024-12-07 PROBLEM — C79.31 METASTATIC ADENOCARCINOMA TO BRAIN (HCC): Status: ACTIVE | Noted: 2024-12-07

## 2024-12-07 LAB
ALBUMIN SERPL-MCNC: 3.8 G/DL (ref 3.2–4.6)
ALBUMIN/GLOB SERPL: 1.1 (ref 1–1.9)
ALP SERPL-CCNC: 111 U/L (ref 35–104)
ALT SERPL-CCNC: 17 U/L (ref 8–45)
ANION GAP SERPL CALC-SCNC: 17 MMOL/L (ref 7–16)
AST SERPL-CCNC: 37 U/L (ref 15–37)
BASOPHILS # BLD: 0 K/UL (ref 0–0.2)
BASOPHILS NFR BLD: 0 % (ref 0–2)
BILIRUB SERPL-MCNC: 0.4 MG/DL (ref 0–1.2)
BUN SERPL-MCNC: 14 MG/DL (ref 8–23)
CALCIUM SERPL-MCNC: 9.7 MG/DL (ref 8.8–10.2)
CHLORIDE SERPL-SCNC: 105 MMOL/L (ref 98–107)
CO2 SERPL-SCNC: 18 MMOL/L (ref 20–29)
CREAT SERPL-MCNC: 0.74 MG/DL (ref 0.6–1.1)
DIFFERENTIAL METHOD BLD: ABNORMAL
EOSINOPHIL # BLD: 0 K/UL (ref 0–0.8)
EOSINOPHIL NFR BLD: 0 % (ref 0.5–7.8)
ERYTHROCYTE [DISTWIDTH] IN BLOOD BY AUTOMATED COUNT: 12.7 % (ref 11.9–14.6)
GLOBULIN SER CALC-MCNC: 3.5 G/DL (ref 2.3–3.5)
GLUCOSE SERPL-MCNC: 143 MG/DL (ref 70–99)
HCT VFR BLD AUTO: 46.3 % (ref 35.8–46.3)
HGB BLD-MCNC: 15.7 G/DL (ref 11.7–15.4)
IMM GRANULOCYTES # BLD AUTO: 0.1 K/UL (ref 0–0.5)
IMM GRANULOCYTES NFR BLD AUTO: 1 % (ref 0–5)
LYMPHOCYTES # BLD: 0.7 K/UL (ref 0.5–4.6)
LYMPHOCYTES NFR BLD: 6 % (ref 13–44)
MCH RBC QN AUTO: 32.9 PG (ref 26.1–32.9)
MCHC RBC AUTO-ENTMCNC: 33.9 G/DL (ref 31.4–35)
MCV RBC AUTO: 97.1 FL (ref 82–102)
MONOCYTES # BLD: 0.1 K/UL (ref 0.1–1.3)
MONOCYTES NFR BLD: 1 % (ref 4–12)
NEUTS SEG # BLD: 10.7 K/UL (ref 1.7–8.2)
NEUTS SEG NFR BLD: 92 % (ref 43–78)
NRBC # BLD: 0 K/UL (ref 0–0.2)
PLATELET # BLD AUTO: 292 K/UL (ref 150–450)
PMV BLD AUTO: 10.6 FL (ref 9.4–12.3)
POTASSIUM SERPL-SCNC: 3.6 MMOL/L (ref 3.5–5.1)
POTASSIUM SERPL-SCNC: ABNORMAL MMOL/L (ref 3.5–5.1)
PROT SERPL-MCNC: 7.3 G/DL (ref 6.3–8.2)
RBC # BLD AUTO: 4.77 M/UL (ref 4.05–5.2)
SODIUM SERPL-SCNC: 140 MMOL/L (ref 136–145)
T4 FREE SERPL-MCNC: 0.9 NG/DL (ref 0.9–1.7)
TSH W FREE THYROID IF ABNORMAL: 4.81 UIU/ML (ref 0.27–4.2)
WBC # BLD AUTO: 11.7 K/UL (ref 4.3–11.1)

## 2024-12-07 PROCEDURE — 6370000000 HC RX 637 (ALT 250 FOR IP): Performed by: STUDENT IN AN ORGANIZED HEALTH CARE EDUCATION/TRAINING PROGRAM

## 2024-12-07 PROCEDURE — 6360000002 HC RX W HCPCS: Performed by: INTERNAL MEDICINE

## 2024-12-07 PROCEDURE — 2580000003 HC RX 258: Performed by: STUDENT IN AN ORGANIZED HEALTH CARE EDUCATION/TRAINING PROGRAM

## 2024-12-07 PROCEDURE — 97530 THERAPEUTIC ACTIVITIES: CPT

## 2024-12-07 PROCEDURE — 51798 US URINE CAPACITY MEASURE: CPT

## 2024-12-07 PROCEDURE — 85025 COMPLETE CBC W/AUTO DIFF WBC: CPT

## 2024-12-07 PROCEDURE — 84443 ASSAY THYROID STIM HORMONE: CPT

## 2024-12-07 PROCEDURE — 70450 CT HEAD/BRAIN W/O DYE: CPT

## 2024-12-07 PROCEDURE — 2000000000 HC ICU R&B

## 2024-12-07 PROCEDURE — 6360000002 HC RX W HCPCS: Performed by: STUDENT IN AN ORGANIZED HEALTH CARE EDUCATION/TRAINING PROGRAM

## 2024-12-07 PROCEDURE — 2580000003 HC RX 258

## 2024-12-07 PROCEDURE — 84132 ASSAY OF SERUM POTASSIUM: CPT

## 2024-12-07 PROCEDURE — 84439 ASSAY OF FREE THYROXINE: CPT

## 2024-12-07 PROCEDURE — 99231 SBSQ HOSP IP/OBS SF/LOW 25: CPT | Performed by: STUDENT IN AN ORGANIZED HEALTH CARE EDUCATION/TRAINING PROGRAM

## 2024-12-07 PROCEDURE — 36415 COLL VENOUS BLD VENIPUNCTURE: CPT

## 2024-12-07 PROCEDURE — 99223 1ST HOSP IP/OBS HIGH 75: CPT | Performed by: INTERNAL MEDICINE

## 2024-12-07 PROCEDURE — 92523 SPEECH SOUND LANG COMPREHEN: CPT

## 2024-12-07 PROCEDURE — 51702 INSERT TEMP BLADDER CATH: CPT

## 2024-12-07 PROCEDURE — 92610 EVALUATE SWALLOWING FUNCTION: CPT

## 2024-12-07 PROCEDURE — 80053 COMPREHEN METABOLIC PANEL: CPT

## 2024-12-07 PROCEDURE — 97162 PT EVAL MOD COMPLEX 30 MIN: CPT

## 2024-12-07 RX ORDER — DEXAMETHASONE SODIUM PHOSPHATE 10 MG/ML
6 INJECTION INTRAMUSCULAR; INTRAVENOUS EVERY 6 HOURS
Status: DISCONTINUED | OUTPATIENT
Start: 2024-12-07 | End: 2024-12-11

## 2024-12-07 RX ORDER — MENTHOL/CAMPHOR/ALLANTOIN/PHE 0.6-0.5-1%
OINTMENT(EA) TOPICAL PRN
Status: DISCONTINUED | OUTPATIENT
Start: 2024-12-07 | End: 2024-12-14 | Stop reason: HOSPADM

## 2024-12-07 RX ORDER — LEVOTHYROXINE SODIUM 50 UG/1
50 TABLET ORAL
Status: DISCONTINUED | OUTPATIENT
Start: 2024-12-07 | End: 2024-12-14 | Stop reason: HOSPADM

## 2024-12-07 RX ORDER — LEVOTHYROXINE SODIUM 50 UG/1
50 TABLET ORAL
Status: DISCONTINUED | OUTPATIENT
Start: 2024-12-08 | End: 2024-12-07

## 2024-12-07 RX ORDER — SODIUM CHLORIDE 9 MG/ML
INJECTION, SOLUTION INTRAVENOUS CONTINUOUS
Status: DISCONTINUED | OUTPATIENT
Start: 2024-12-07 | End: 2024-12-07

## 2024-12-07 RX ORDER — 0.9 % SODIUM CHLORIDE 0.9 %
500 INTRAVENOUS SOLUTION INTRAVENOUS ONCE
Status: COMPLETED | OUTPATIENT
Start: 2024-12-07 | End: 2024-12-08

## 2024-12-07 RX ORDER — TAMSULOSIN HYDROCHLORIDE 0.4 MG/1
0.4 CAPSULE ORAL DAILY
Status: DISCONTINUED | OUTPATIENT
Start: 2024-12-07 | End: 2024-12-14 | Stop reason: HOSPADM

## 2024-12-07 RX ORDER — NALOXONE HYDROCHLORIDE 0.4 MG/ML
INJECTION, SOLUTION INTRAMUSCULAR; INTRAVENOUS; SUBCUTANEOUS
Status: DISPENSED
Start: 2024-12-07 | End: 2024-12-07

## 2024-12-07 RX ORDER — NALOXONE HYDROCHLORIDE 0.4 MG/ML
0.2 INJECTION, SOLUTION INTRAMUSCULAR; INTRAVENOUS; SUBCUTANEOUS PRN
Status: DISCONTINUED | OUTPATIENT
Start: 2024-12-07 | End: 2024-12-14 | Stop reason: HOSPADM

## 2024-12-07 RX ADMIN — TAMSULOSIN HYDROCHLORIDE 0.4 MG: 0.4 CAPSULE ORAL at 10:25

## 2024-12-07 RX ADMIN — Medication 250 MG: at 07:45

## 2024-12-07 RX ADMIN — DEXAMETHASONE SODIUM PHOSPHATE 4 MG: 4 INJECTION INTRA-ARTICULAR; INTRALESIONAL; INTRAMUSCULAR; INTRAVENOUS; SOFT TISSUE at 05:05

## 2024-12-07 RX ADMIN — NALXONE HYDROCHLORIDE 0.2 MG: 0.4 INJECTION INTRAMUSCULAR; INTRAVENOUS; SUBCUTANEOUS at 03:19

## 2024-12-07 RX ADMIN — NALXONE HYDROCHLORIDE 0.2 MG: 0.4 INJECTION INTRAMUSCULAR; INTRAVENOUS; SUBCUTANEOUS at 03:34

## 2024-12-07 RX ADMIN — SODIUM CHLORIDE: 9 INJECTION, SOLUTION INTRAVENOUS at 11:59

## 2024-12-07 RX ADMIN — SODIUM CHLORIDE, PRESERVATIVE FREE 10 ML: 5 INJECTION INTRAVENOUS at 07:46

## 2024-12-07 RX ADMIN — DEXAMETHASONE SODIUM PHOSPHATE 6 MG: 10 INJECTION INTRAMUSCULAR; INTRAVENOUS at 17:04

## 2024-12-07 RX ADMIN — LEVOTHYROXINE SODIUM 50 MCG: 0.05 TABLET ORAL at 13:51

## 2024-12-07 RX ADMIN — DEXAMETHASONE SODIUM PHOSPHATE 4 MG: 4 INJECTION INTRA-ARTICULAR; INTRALESIONAL; INTRAMUSCULAR; INTRAVENOUS; SOFT TISSUE at 10:26

## 2024-12-07 RX ADMIN — Medication 7 G: at 13:51

## 2024-12-07 RX ADMIN — CHOLECALCIFEROL TAB 125 MCG (5000 UNIT) 5000 UNITS: 125 TAB at 08:32

## 2024-12-07 RX ADMIN — SODIUM CHLORIDE, PRESERVATIVE FREE 10 ML: 5 INJECTION INTRAVENOUS at 21:56

## 2024-12-07 RX ADMIN — LEVETIRACETAM 500 MG: 100 INJECTION INTRAVENOUS at 00:07

## 2024-12-07 RX ADMIN — LEVETIRACETAM 500 MG: 100 INJECTION INTRAVENOUS at 11:31

## 2024-12-07 RX ADMIN — SODIUM CHLORIDE 500 ML: 9 INJECTION, SOLUTION INTRAVENOUS at 22:35

## 2024-12-07 ASSESSMENT — PAIN SCALES - GENERAL
PAINLEVEL_OUTOF10: 0

## 2024-12-07 NOTE — PLAN OF CARE
Problem: Discharge Planning  Goal: Discharge to home or other facility with appropriate resources  12/7/2024 0907 by Rony Aragon, RN  Outcome: Not Progressing  Flowsheets (Taken 12/7/2024 0715)  Discharge to home or other facility with appropriate resources:   Identify barriers to discharge with patient and caregiver   Arrange for needed discharge resources and transportation as appropriate  12/7/2024 0001 by Mayra Curry, RN  Outcome: Progressing  Flowsheets (Taken 12/6/2024 2230)  Discharge to home or other facility with appropriate resources: Identify barriers to discharge with patient and caregiver

## 2024-12-07 NOTE — ED TRIAGE NOTES
Reports she has a MRI of brain yesterday and received a call today to come to ER due to brain bleed. C/o headache. Symptoms started before 1700 on 12/6/24

## 2024-12-07 NOTE — PROGRESS NOTES
Hospitalist Progress Note   Admit Date:  2024  7:34 PM   Name:  Haven Hui   Age:  79 y.o.  Sex:  female  :  1945   MRN:  [id number]   Room:  [address] [place] [address]    Presenting/Chief Complaint: Headache     Reason(s) for Admission: Metastatic adenocarcinoma to brain (HCC) [C79.31]  Vasogenic edema (HCC) [G93.6]     Hospital Course:   Haven Hui is a 79 y.o. 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  Patient underwent MRI and was evaluated by her oncologist Dr. Johnson with the abnormal results which showed multiple lesions in the brain with associated vasogenic edema concerning for surrounding hemorrhage.  Patient has been having headaches over the past few weeks which prompted her to have imaging done.  Patient does take Xarelto due to history of pulmonary embolism.  Patient also complained of some blurry vision as well as numbness to her right proximal upper extremity.  Patient status post IV Decadron 10 mg in the ED.  Repeat CT showed bilateral brain mets with extensive vasogenic edema with 4 mm left to right shift.  Neurosurgery was contacted by ED provider.  MRI with multiple lesions in the brain with associated vasogenic edema.  Concern for surrounding hemorrhage  - Repeat CT with bilateral brain mets with extensive vasogenic edema.  4 mm left-to-right shift      Subjective & 24hr Events:   Patient was seen and examined at the bedside.  She was very confused.  Unable to obtain ROS.  Discussed with the RN at the bedside.      Assessment & Plan:   Vasogenic edema (HCC)  Metastasis to brain  Repeat CT head is stable  S/p 10 mg IV Decadron in ED.  Continue Decadron 4 mg every 6 hours.  - Keep SBP < 140  - Every 2 hours neuro checks.  Oncology on board, appreciate recommendations    Leukocytosis of 11.7 K in the  Ratio 1.1 1.0 - 1.9     CBC with Auto Differential    Collection Time: 12/07/24  5:18 AM   Result Value Ref Range    WBC 11.7 (H) 4.3 - 11.1 K/uL    RBC 4.77 4.05 - 5.2 M/uL    Hemoglobin 15.7 (H) 11.7 - 15.4 g/dL    Hematocrit 46.3 35.8 - 46.3 %    MCV 97.1 82 - 102 FL    MCH 32.9 26.1 - 32.9 PG    MCHC 33.9 31.4 - 35.0 g/dL    RDW 12.7 11.9 - 14.6 %    Platelets 292 150 - 450 K/uL    MPV 10.6 9.4 - 12.3 FL    nRBC 0.00 0.0 - 0.2 K/uL    Differential Type AUTOMATED      Neutrophils % 92 (H) 43 - 78 %    Lymphocytes % 6 (L) 13 - 44 %    Monocytes % 1 (L) 4.0 - 12.0 %    Eosinophils % 0 (L) 0.5 - 7.8 %    Basophils % 0 0.0 - 2.0 %    Immature Granulocytes % 1 0.0 - 5.0 %    Neutrophils Absolute 10.7 (H) 1.7 - 8.2 K/UL    Lymphocytes Absolute 0.7 0.5 - 4.6 K/UL    Monocytes Absolute 0.1 0.1 - 1.3 K/UL    Eosinophils Absolute 0.0 0.0 - 0.8 K/UL    Basophils Absolute 0.0 0.0 - 0.2 K/UL    Immature Granulocytes Absolute 0.1 0.0 - 0.5 K/UL   Potassium    Collection Time: 12/07/24  7:22 AM   Result Value Ref Range    Potassium 3.6 3.5 - 5.1 mmol/L   TSH with Reflex    Collection Time: 12/07/24  7:22 AM   Result Value Ref Range    TSH w Free Thyroid if Abnormal 4.81 (H) 0.27 - 4.20 UIU/ML   T4, Free    Collection Time: 12/07/24  7:22 AM   Result Value Ref Range    T4 Free 0.9 0.9 - 1.7 NG/DL       No results for input(s): \"COVID19\" in the last 72 hours.    Current Meds:  Current Facility-Administered Medications   Medication Dose Route Frequency    naloxone (NARCAN) injection 0.2 mg  0.2 mg IntraVENous PRN    tamsulosin (FLOMAX) capsule 0.4 mg  0.4 mg Oral Daily    0.9 % sodium chloride infusion   IntraVENous Continuous    levothyroxine (SYNTHROID) tablet 50 mcg  50 mcg Oral QAM AC    vitamin C tablet 250 mg  250 mg Oral Daily    baclofen (LIORESAL) tablet 10 mg  10 mg Oral Nightly PRN    capecitabine (XELODA) chemo tablet 1,500 mg- patient supplied (Patient Supplied)  1,000 mg/m2 (Order-Specific) Oral BID PC    vitamin D3

## 2024-12-07 NOTE — CONSULTS
[facility]      Neurosurgery Consult Note         Admission Date: 12/6/2024    Attending Physician: Darron Wilson MD         Reason for Consult: Multiple intraparenchymal lesions              Subjective     History of Present Illness    Ms. Hui is a  79-year-old female with history of cecal adenocarcinoma, PE on Xarelto who presented to the emergency room after an outpatient MRI finding.  Patient's oncologist ordered an MRI after PET scan finding which showed multiple bilateral supratentorial lesion suspected to be metastasis.  The largest of the lesions was noted to be on the left frontal region which is also invading the overlying dura and has vasogenic edema causing  about a 5 mm midline shift to the right.  Given MRI finding patient was referred to the emergency department to be evaluated.  On arrival to the emergency room patient reported she has slight frontal headaches and some blurred vision.    Of note patient has metastatic lesion involving the C7 vertebral body and the right pedicle and lateral mass.    Blood thinners:Xarelto          Past Medical History:   Diagnosis Date    Anemia     Rx iron    Cecal cancer (HCC)     History of pulmonary embolism     on Xarelto    Hypothyroidism     managed with medication    Malignant neoplasm of ascending colon (HCC)     Primary colon cancer with metastasis to 7 or more regional lymph nodes (N2b) (HCC)        Past Surgical History:   Procedure Laterality Date    APPENDECTOMY  11/15/23    CATARACT REMOVAL Bilateral 2021    COLONOSCOPY N/A 5/20/2024    COLORECTAL CANCER SCREENING, NOT HIGH RISK performed by Saul Kc MD at [facility] ENDOSCOPY    HERNIA REPAIR Right 07/20/2023    LAPAROSCOPIC RIGHT INGUINAL HERNIA REPAIR WITH MESH performed by Romel Ricks MD at [facility] MAIN OR    LAPAROSCOPIC APPENDECTOMY N/A 11/15/2023    ATTEMPTED APPENDECTOMY LAPAROSCOPIC CONVERTED TO OPEN; ILEOCYSTECTOMY WITH APPENDECTOMY, ILEOCOLOSTOMY, DRAINAGE  ProHance.    FINDINGS:  No extra-axial fluid collection is noted. There is left to right midline shift.  There are multiple bilateral supratentorial metastases with significant  surrounding vasogenic edema including in the left frontal lobe where there is  sulcal effacement with a large 2.8 x 3.1 x 2.7 cm enhancing lesion, right  posterior temporal occipital lesion with extensive surrounding vasogenic edema  measuring 2.3 x 3.1 x 2.8 cm in size. Additional 1 cm enhancing lesion in the  frontoparietal high convexity measuring 1 x 1.2 cm in size. Furthermore an  additional high convexity lesion measuring 1.5 x 1 cm in size in the left  frontoparietal high convexity. Extensive vasogenic edema. Is also appreciated.    5 mm subtle enhancing lesion at the left frontal parietal region abutting a deep  sulcus is appreciated.    No intracranial hemorrhage or midline shift    Visualized paranasal sinuses are aerated.    Impression  1. Multiple bilateral supratentorial metastases with extensive vasogenic edema  and sulcal effacement in the region of the edema especially in the left frontal  parietal region. Furthermore there is approximately 4 to 5 mm left to right  midline shift.    Electronically signed by Ruben Webber             Assessment & Plan     Ms. Hui is a 79 y.o. female with a PMH of metastatic adenocarcinoma of the colon  who presents with multiple intracranial lesion    Had a long discussion with patient.  Patient has been multiple intracranial lesions with the 2 largest being on the left frontal region and the right occipital region.  The lesion is noted to be invading the overlying dura particularly on the left frontal region lesion.  Lesion also has some vasogenic edema causing some midline shift.  At this point patient is clinically stable so I recommend continuing dexamethasone.    Given the extent and the tumor burden and no focal neurological deficit I think patient is not a good surgical candidate.

## 2024-12-07 NOTE — ED NOTES
TRANSFER - OUT REPORT:    Verbal report given to Felipe BENOIT on Haven Hui  being transferred to room 3109 for routine progression of patient care       Report consisted of patient's Situation, Background, Assessment and   Recommendations(SBAR).     Information from the following report(s) ED SBAR was reviewed with the receiving nurse.    Lines:   Peripheral IV 12/06/24 Proximal;Right;Anterior Forearm (Active)   Site Assessment Clean, dry & intact 12/06/24 2148   Line Status Blood return noted;Normal saline locked;Flushed 12/06/24 2148   Line Care Connections checked and tightened 12/06/24 2148   Phlebitis Assessment No symptoms 12/06/24 2148   Infiltration Assessment 0 12/06/24 2148   Dressing Status Clean, dry & intact 12/06/24 2148        Opportunity for questions and clarification was provided.      Patient transported with:  Registered Nurse      Gerri Rinaldi RN  12/06/24 2149

## 2024-12-07 NOTE — PROGRESS NOTES
GOALS:  LTG: Patient will maintain adequate hydration/nutrition with optimum safety and efficiency of swallowing function with PO intake without overt signs or symptoms of aspiration for the highest appropriate diet level.  STG:  Patient will consume minced and moist textures and thin liquids without overt signs or symptoms of airway compromise.  Patient will safely ingest diet trials during therapeutic feedings with SLP without overt signs or symptoms of respiratory compromise in efforts to advance diet.    LTG: Patient will improve neuro-linguistic abilities to increase safety and awareness in functional living environment.     Patient will utilize visual supports/aids to improve orientation with min cues.     SPEECH LANGUAGE PATHOLOGY: COMBINED Cognition and Dysphagia Initial Assessment    Acknowledge Order  I  Therapy Time  I   Charges     I  Rehab Caseload Tracker    NAME: Haven Hui  : 1945  MRN: [id number]    ADMISSION DATE: 2024  PRIMARY DIAGNOSIS: Vasogenic edema (HCC)    ICD-10: Treatment Diagnosis: R13.12 Dysphagia, Oropharyngeal Phase  R41.841 Cognitive-Communication Deficit    RECOMMENDATIONS   Diet:    Minced and Moist  Thin Liquids    Medication: as tolerated   Compensatory Swallowing Strategies:   Assist feed  Slow rate of intake  Small bites/sips  Upright as possible for all oral intake  Set up assist   Therapeutic Intervention:   Patient/family education  Dysphagia treatment  Cognitive-linguistic treatment   Patient continues to require skilled intervention:  Yes. Recommend ongoing speech therapy services during this hospitalization.     Anticipated Discharge Needs: Do not anticipate ongoing speech therapy needs upon discharge.      ASSESSMENT    Patient presents with oropharyngeal dysphagia characterized by prolonged oral phase of regular textures. Intermittent delayed swallow initiation of thin liquids but no  overt s/s of aspiration of any of the textures  of squash from her lunch tray, apple sauce and canned peaches.  Appropriate oral prep with purees and peaches. Prolonged mastication of food items from her tray- squash and beef tips.  Intermittent delayed swallow initiation of thin liquids, and single swallows upon palpation. Adequate oral clearing. No overt signs or symptoms of airway compromise observed with liquid or solid textures. Patient only consumed a few bites from her lunch tray and refused further PO intake. Recommend minced and moist with thin liquids.       Vocal Quality:  Adequate       Speech Intelligiblity: Within functional limits:      Auditory Comprehension: Impaired: delayed processing speed    Verbal Expression: Within functional limits:      Reading Comprehension: Not assessed    Written Expression: Not assessed    Neuro-Linguistics: Impaired: disoriented to date and situation but knew her location and month/year. Inaccurate responses to some basic biographical questions but benefited from cues from the RN and friend    Pragmatics: Not assessed    Tests Given: Orientation Log- did not administer the entire log.       Dysphagia Outcome and Severity Scale (JESSIKA)  Score:2 Description   [] 7 Normal in all situations   [] 6 Within Functional Limits/ Modified independent   [] 5 Mild Dysphagia: Distant Supervision. May need one diet consistency      restricted.   [] 4 Mild-Moderate Dysphagia: Intermittent supervision/cuing. One-two diet    consistencies restricted.   [] 3 Moderate Dysphagia: Total assistance, supervision, or strategies.       Two or more diet consistencies restricted.   [x] 2 Moderate-Severe Dysphagia: Maximum assistance or maximum use     of strategies with partial po intake   [] 1 Severe dysphagia- NPO. Unable to tolerate any po safely     MODIFIED GENNARO SCALE (mRS) SCORE:   Score: 4 Description   [] 0  No Symptoms   [] 1 No significant disability despite symptoms; Able to carry out all usual duties and activities.   [] 2 Slight

## 2024-12-07 NOTE — H&P
Hospitalist History and Physical   Admit Date:  2024  7:34 PM   Name:  Haven Hui   Age:  79 y.o.  Sex:  female  :  1945   MRN:  [id number]   Room:  [facility]    Presenting/Chief Complaint: Headache     Reason(s) for Admission: Vasogenic edema (HCC) [G93.6]     History of Present Illness:   Haven Hui is a 79 y.o. 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  Patient underwent MRI and was evaluated by her oncologist Dr. Johnson with the abnormal results which showed multiple lesions in the brain with associated vasogenic edema concerning for surrounding hemorrhage.  Patient has been having headaches over the past few weeks which prompted her to have imaging done.  Patient does take Xarelto due to history of pulmonary embolism.  Patient also complained of some blurry vision as well as numbness to her right proximal upper extremity.  Patient status post IV Decadron 10 mg in the ED.  Repeat CT showed bilateral brain mets with extensive vasogenic edema with 4 mm left to right shift.  Neurosurgery was contacted by ED provider.      Assessment & Plan:   Vasogenic edema (HCC)  Metastasis to brain  - MRI with multiple lesions in the brain with associated vasogenic edema.  Concern for surrounding hemorrhage  - Repeat CT with bilateral brain mets with extensive vasogenic edema.  4 mm left-to-right shift  - Neurosurgery consulted, appreciate recommendations  - Status post 10 mg IV Decadron in ED.  Continue Decadron 4 mg every 6 hours  - Keep systolic blood pressure less than 140  - SCDs.  No anticoagulation  - Telemetry  - N.p.o.  - Speech therapy consulted  - PT/OT  - Head of bed 30 degrees  - Every 2 hours neurochecks  - RUST  - Oncology consulted.  - Repeat CT head in a.m.  - Hydralazine as 
none

## 2024-12-07 NOTE — PROGRESS NOTES
ACUTE PHYSICAL THERAPY GOALS:   (Developed with and agreed upon by patient and/or caregiver.)  STG:  (1.)Ms. Hui will move from supine to sit and sit to supine , scoot up and down, and roll side to side with MINIMAL ASSIST within 4 treatment day(s).    (2.)Ms. Hui will transfer from bed to chair and chair to bed with MINIMAL ASSIST using the least restrictive device within 4 treatment day(s).    (3.)Ms. Hui will ambulate with MODERATE ASSIST for 25 feet with the least restrictive device within 4 treatment day(s).     LTG:  (1.)Ms. Hui will move from supine to sit and sit to supine , scoot up and down, and roll side to side in bed with CONTACT GUARD ASSIST within 7 treatment day(s).    (2.)Ms. Hui will transfer from bed to chair and chair to bed with CONTACT GUARD ASSIST using the least restrictive device within 7 treatment day(s).    (3.)Ms. Hui will ambulate with MINIMAL ASSIST for 50 feet with the least restrictive device within 7 treatment day(s).  ________________________________________________________________________________________________     PHYSICAL THERAPY Initial Assessment and AM  (Link to Caseload Tracking: PT Visit Days : 1  Acknowledge Orders  Time In/Out  PT Charge Capture  Rehab Caseload Tracker    Haven Hui is a 79 y.o. female   PRIMARY DIAGNOSIS: Vasogenic edema (HCC)  Metastatic adenocarcinoma to brain (HCC) [C79.31]  Vasogenic edema (HCC) [G93.6]       Reason for Referral: Generalized Muscle Weakness (M62.81)  Other lack of cordination (R27.8)  Difficulty in walking, Not elsewhere classified (R26.2)  Other abnormalities of gait and mobility (R26.89)  Ataxic gait (R26.0)  Inpatient: Payor: [employer] MEDICARE / Plan: [employer] GOLD PLUS HMO / Product Type: *No Product type* /     ASSESSMENT:     REHAB RECOMMENDATIONS:   Recommendation to date pending progress:  Setting:  Short-term Rehab    Equipment:    To Be Determined     ASSESSMENT:  Ms. Hui is a  Fall Risk    Stairs      I=Independent, Mod I=Modified Independent, S=Supervision, SBA=Standby Assistance, CGA=Contact Guard Assistance,   Min=Minimal Assistance, Mod=Moderate Assistance, Max=Maximal Assistance, Total=Total Assistance, NT=Not Tested    PLAN:   FREQUENCY AND DURATION: 3 times/week for duration of hospital stay or until stated goals are met, whichever comes first.    THERAPY PROGNOSIS: Fair    PROBLEM LIST:   (Skilled intervention is medically necessary to address:)  Decreased Activity Tolerance  Decreased AROM/PROM  Decreased Balance  Decreased Cognition  Decreased Coordination  Decreased Gait Ability  Decreased Safety Awareness  Decreased Strength  Decreased Transfer Abilities INTERVENTIONS PLANNED:   (Benefits and precautions of physical therapy have been discussed with the patient.)  Therapeutic Activity  Therapeutic Exercise/HEP  Neuromuscular Re-education  Gait Training       TREATMENT:   EVALUATION: MODERATE COMPLEXITY: (Untimed Charge)  The initial evaluation charge encompasses clinical chart review, objective assessment, interpretation of assessment, and skilled monitoring of the patient's response to treatment in order to develop a plan of care.     TREATMENT:   Therapeutic Activity (24 Minutes): Therapeutic activity included Rolling, Supine to Sit, Sit to Supine, Scooting, Lateral Scooting, Transfer Training, Ambulation on level ground, Sitting balance , and Standing balance to improve functional Activity tolerance, Balance, Coordination, Mobility, and Strength.    TREATMENT GRID:  N/A    AFTER TREATMENT PRECAUTIONS: Bed, Bed/Chair Locked, Call light within reach, Chair, Needs within reach, RN at bedside, RN notified, and Side rails x3    INTERDISCIPLINARY COLLABORATION:  RN/ PCT and PT/ PTA    EDUCATION: Education Given To: Patient  Education Provided: Role of Therapy  Education Method: Verbal  Barriers to Learning: Cognition  Education Outcome: Continued education needed    TIME

## 2024-12-07 NOTE — PROGRESS NOTES
[facility]    Assessment: Ms. Hui is a 79 y.o. female   who presented with metastatic adenocarcinoma with multiple intraparenchymal lesions        Plan:  -Discussed with oncology and also patient's daughter.  Patient has multiple intraparenchymal lesions the largest of the lesion being on the left frontal region.  Patient this morning seem to be more somnolent than yesterday.  Given patient's tumor burden I do not think surgery will improve patient's outcome.  At this point I recommend continue medical treatment.  Increased patient's steroids.  Will defer to oncology, patient may need systemic treatment or palliative radiation.        Subjective: Patient more somnolent today.  Repeat CT shows stable midline shift and vasogenic edema.    Objective:    Neurologic Exam:  Afebrile  Somnolent but wakes up to voice  Aox1 (oriented to self)  Pupils 3 and brisk b/l  FS  TML  Fcx4 minimally   Unable to check pronator drift      Vitals:    12/07/24 1330   BP: (!) 114/55   Pulse: 68   Resp: 12   Temp:    SpO2: 94%       Luis Alfredo Tran MD

## 2024-12-07 NOTE — ED PROVIDER NOTES
was performed in the emergency department. This was separate from any other procedures listed during the patients emergency department course. The failure to initiate these interventions on an urgent basis would likely have resulted in sudden, clinically significant or life-threatening deterioration in the patients condition.     History     79-year-old female patient with history of metastatic adenocarcinoma presenting to this department with reports of abnormal outpatient MRI.  This imaging study was collected yesterday, patient was contacted today by Dr. Omer of oncology with abnormal results and instructed to come to this department for further treatment.  Patient states that she has been having headaches for the past several weeks and these worsened prompting a telemedicine visit with Dr. Omer yesterday.  Patient does take Xarelto per review of medication list, she denies any associated fevers or chills.  She has noted some blurred vision over the past several weeks and reports occasional numbness in the proximal right upper extremity.  Patient arrives with family friends who transferred to this facility tonight    The history is provided by the patient and a friend. No  was used.       ROS     Review of Systems     Physical Exam     Vitals signs and nursing note reviewed:  Vitals:    12/06/24 2337 12/06/24 2345 12/07/24 0000 12/07/24 0015   BP:  (!) 117/56 121/80    Pulse:  83 80 80   Resp: 22 15 16 15   Temp:       TempSrc:       SpO2:  94% 95% 95%   Weight:       Height:          Physical Exam  Vitals and nursing note reviewed.   Constitutional:       General: She is not in acute distress.     Appearance: Normal appearance. She is normal weight. She is not ill-appearing or toxic-appearing.      Comments: Generally well-appearing, alert and oriented x4.  No acute distress, speaks in clear, fluid sentences.   HENT:      Head: Normocephalic and atraumatic.      Right Ear: External ear  Scale  Interval: Reassessment  Level of Consciousness (1a): Not alert, but arousable by minor stimulation to obey  LOC Questions (1b): Answers neither question correctly  LOC Commands (1c): Performs both tasks correctly  Best Gaze (2): Normal  Visual (3): No visual loss  Facial Palsy (4): Normal symmetrical movement  Motor Arm, Left (5a): No drift  Motor Arm, Right (5b): No drift  Motor Leg, Left (6a): No drift  Motor Leg, Right (6b): No drift  Limb Ataxia (7): Absent  Sensory (8): (!) Mild to Moderate  Best Language (9): No aphasia  Dysarthria (10): Normal  Extinction and Inattention (11): No abnormality  Total: 4         No results for input(s): \"COVID19\" in the last 72 hours.    Voice dictation software was used during the making of this note.  This software is not perfect and grammatical and other typographical errors may be present.  This note has not been completely proofread for errors.        Jean Nolen, DO  12/07/24 0130

## 2024-12-07 NOTE — PROGRESS NOTES
4 Eyes Skin Assessment     NAME:  Haven Hui  YOB: 1945  MEDICAL RECORD NUMBER:  [id number]    The patient is being assessed for  Admission    I agree that at least one RN has performed a thorough Head to Toe Skin Assessment on the patient. ALL assessment sites listed below have been assessed.      Areas assessed by both nurses:    Head, Face, Ears, Shoulders, Back, Chest, Arms, Elbows, Hands, Sacrum. Buttock, Coccyx, Ischium, Legs. Feet and Heels, and Under Medical Devices         Does the Patient have a Wound? No noted wound(s)       Roge Prevention initiated by RN: Yes  Wound Care Orders initiated by RN: No    Pressure Injury (Stage 3,4, Unstageable, DTI, NWPT, and Complex wounds) if present, place Wound referral order by RN under : No    New Ostomies, if present place, Ostomy referral order under : No     Nurse 1 eSignature: Electronically signed by Mayra Curry RN on 12/7/24 at 12:25 AM EST    **SHARE this note so that the co-signing nurse can place an eSignature**    Nurse 2 eSignature: Electronically signed by BONNY CM RN on 12/7/24 at 6:03 AM EST

## 2024-12-07 NOTE — PROGRESS NOTES
Pt has not voided. MD updated on bladder scan earlier and no void. New orders to insert esqueda for retention.   Esqueda inserted. 500 ml returned.

## 2024-12-07 NOTE — PLAN OF CARE
Problem: Discharge Planning  Goal: Discharge to home or other facility with appropriate resources  Outcome: Progressing  Flowsheets (Taken 12/6/2024 2230)  Discharge to home or other facility with appropriate resources: Identify barriers to discharge with patient and caregiver     Problem: Pain  Goal: Verbalizes/displays adequate comfort level or baseline comfort level  Outcome: Progressing

## 2024-12-07 NOTE — PROGRESS NOTES
Spoke with pt's youngest child, Kyle who lives in [place]. Updated on pt's current neuro status. Asked that all 3 children talk about DNR status.

## 2024-12-07 NOTE — PROGRESS NOTES
Dr Tran here to see pt. Spoke with pt's daughter on phone. Unable to reach son. Due to increase lethargy, stat head CT done.

## 2024-12-07 NOTE — PROGRESS NOTES
Nutrition Assessment  Assessment Type: Initial  Reason for visit:  Best Practice Alert: Malnutrition Screening Tool   Malnutrition Screening Tool Score: 3    Nutrition Intervention:   Food and/or Nutrient Delivery:   Meals and Snacks:  Diet: Downgrade per SLP eval  Medical Food Supplements:   Medical food supplement therapy:  Initiate Ensure Enlive (high calorie high protein) 350 calories, 20 grams protein per 8 ounce serving  - chocolate     Malnutrition Assessment:  Malnutrition Status: At risk for malnutrition (compromised appetite, declining oral intake, reported wt loss)  Nutrition Focused Physical Exam: Deferred due to fatigue, nausea  Nutrition Assessment:  Food/Nutrition Related History:   Pt reports feeling poorly, + nausea, fatigue and decreased intake pta.  She is unable to quantify recent oral intake.  Friend at bedside, reports she recently went to [place] for a visit with her family.  Upon returning it appears her intake has declined.  She reported to friend at home that she had eaten some soup.  Soup container in sink but unknown quantity of any recent intake pta.           Weight History:   Pt UBW report consistent with records below.  She reports current wt of 112# at RD encounter.    Wt hx per Oncology records:   114# 10/24, 114# 6/24, 112# 3/24, 115# 12/23.    Admission wt stated 120#.    Nutrition Background:       PMH remarkable for anemia, hx of PE on Xarelto, hypothyroidism, malignant neoplasm of ascending colon, metastatic adenocarcinoma to the brain.    Presented after abnormal MRI.   Admitted with vasogenic edema, metastasis to brain, leukocytosis, dysphagia, acute urinary retention.    Nutrition Monitoring/Evaluation:  SLP eval 12/7: Minced and moist.    +decadron.    Pt is alert, oriented with delayed responses.  Oral intake minimal thus far, nursing notes she will drink items if handed to her and directed to drink.  She doesn't self initiate oral intake.  Agreeable to ONS at RD visit,

## 2024-12-07 NOTE — CONSULTS
Never   Intimate Partner Violence: Unknown (3/20/2021)    [facility]    Intimate Partner Violence     Fear of Current or Ex-Partner: Not asked     Emotionally Abused: Not asked     Physically Abused: Not asked     Sexually Abused: Not asked   Housing Stability: Low Risk  (12/6/2024)    Housing Stability Vital Sign     Unable to Pay for Housing in the Last Year: No     Number of Times Moved in the Last Year: 1     Homeless in the Last Year: No     Current Facility-Administered Medications   Medication Dose Route Frequency Provider Last Rate Last Admin    naloxone (NARCAN) injection 0.2 mg  0.2 mg IntraVENous PRN Kaitlin Torres MD   0.2 mg at 12/07/24 0334    tamsulosin (FLOMAX) capsule 0.4 mg  0.4 mg Oral Daily Camille Christianson MD   0.4 mg at 12/07/24 1025    levothyroxine (SYNTHROID) tablet 50 mcg  50 mcg Oral QAM AC Camille Christianson MD   50 mcg at 12/07/24 1351    dexAMETHasone (DECADRON) injection 6 mg  6 mg IntraVENous Q6H Luis Alfredo Tran MD        medicated lip ointment (BLISTEX)   Topical PRN Camille Christianson MD   7 g at 12/07/24 1351    vitamin C tablet 250 mg  250 mg Oral Daily Darron Wilson MD   250 mg at 12/07/24 0745    baclofen (LIORESAL) tablet 10 mg  10 mg Oral Nightly PRN Darron Wilson MD        capecitabine (XELODA) chemo tablet 1,500 mg- patient supplied (Patient Supplied)  1,000 mg/m2 (Order-Specific) Oral BID PC Darron Wilson MD        vitamin D3 (CHOLECALCIFEROL) tablet 5,000 Units  5,000 Units Oral Daily Darron Wilson MD   5,000 Units at 12/07/24 0832    [Held by provider] rivaroxaban (XARELTO) tablet 20 mg  20 mg Oral Daily with breakfast Darron Wilson MD        sodium chloride flush 0.9 % injection 5-40 mL  5-40 mL IntraVENous 2 times per day Darron Wilson MD   10 mL at 12/07/24 0746    sodium chloride flush 0.9 % injection 5-40 mL  5-40 mL IntraVENous PRN Darron Wilson MD        0.9 % sodium chloride infusion   IntraVENous  in size in the left  frontoparietal high convexity. Extensive vasogenic edema. Is also appreciated.     5 mm subtle enhancing lesion at the left frontal parietal region abutting a deep  sulcus is appreciated.     No intracranial hemorrhage or midline shift     Visualized paranasal sinuses are aerated.     IMPRESSION:  1. Multiple bilateral supratentorial metastases with extensive vasogenic edema  and sulcal effacement in the region of the edema especially in the left frontal  parietal region. Furthermore there is approximately 4 to 5 mm left to right  midline shift.     Electronically signed by Ruben Webber           Exam Ended: 12/05/24 17:43 EST Last Resulted: 12/05/24 18:33 EST          ASSESSMENT:  Ms. Hui is a 79 year old female that a history of adenocarcinoma that sees Dr. Johnson outpatient. She also has a history of a PE after surgery without cor pulmonale and on Xarelto. 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 She was not interested in chemotherapy and wished for only surveillance at that time. In October 2024, she saw Dr. Johnson outpatient and PET scan was discussed that showed what appeared as active sites of disease in the supraclavicular lymph node, cervical spine, liver, and adrenal gland. L retroclavicular lymph node was biopsied on 11/4 and was c/w metastatic adenocarcinoma. MRI C-spine showed a lesion in C7 with involvement of the R pedicle and lateral mass and with cortical breakthrough along the R anterior lateral margin with a small paravertebral soft tissue extension. She was asymptomatic so XRT was held. MRI Brain was ordered to complete staging. MRI showed multiple brain masses with vasogenic edema with 5mm midline shift. Patient was sent to the ED. She has started Dex and Keppra. Neurosurgery has been consulted and patient was admitted to

## 2024-12-07 NOTE — PROGRESS NOTES
Oncology here to see pt. Talked with pt's son Darron on phone. Pt's friend is at bedside and is also aware of all updates. Neurosurgery was asked for their input also.

## 2024-12-08 LAB
ANION GAP SERPL CALC-SCNC: 14 MMOL/L (ref 7–16)
BASOPHILS # BLD: 0 K/UL (ref 0–0.2)
BASOPHILS NFR BLD: 0 % (ref 0–2)
BUN SERPL-MCNC: 23 MG/DL (ref 8–23)
CALCIUM SERPL-MCNC: 9.3 MG/DL (ref 8.8–10.2)
CHLORIDE SERPL-SCNC: 104 MMOL/L (ref 98–107)
CO2 SERPL-SCNC: 23 MMOL/L (ref 20–29)
CREAT SERPL-MCNC: 0.72 MG/DL (ref 0.6–1.1)
DIFFERENTIAL METHOD BLD: ABNORMAL
EOSINOPHIL # BLD: 0 K/UL (ref 0–0.8)
EOSINOPHIL NFR BLD: 0 % (ref 0.5–7.8)
ERYTHROCYTE [DISTWIDTH] IN BLOOD BY AUTOMATED COUNT: 12.8 % (ref 11.9–14.6)
GLUCOSE SERPL-MCNC: 140 MG/DL (ref 70–99)
HCT VFR BLD AUTO: 42.2 % (ref 35.8–46.3)
HGB BLD-MCNC: 14.3 G/DL (ref 11.7–15.4)
IMM GRANULOCYTES # BLD AUTO: 0.1 K/UL (ref 0–0.5)
IMM GRANULOCYTES NFR BLD AUTO: 0 % (ref 0–5)
LYMPHOCYTES # BLD: 0.9 K/UL (ref 0.5–4.6)
LYMPHOCYTES NFR BLD: 6 % (ref 13–44)
MCH RBC QN AUTO: 32.4 PG (ref 26.1–32.9)
MCHC RBC AUTO-ENTMCNC: 33.9 G/DL (ref 31.4–35)
MCV RBC AUTO: 95.5 FL (ref 82–102)
MONOCYTES # BLD: 0.5 K/UL (ref 0.1–1.3)
MONOCYTES NFR BLD: 3 % (ref 4–12)
NEUTS SEG # BLD: 14.2 K/UL (ref 1.7–8.2)
NEUTS SEG NFR BLD: 91 % (ref 43–78)
NRBC # BLD: 0 K/UL (ref 0–0.2)
PHOSPHATE SERPL-MCNC: 3.1 MG/DL (ref 2.5–4.5)
PLATELET # BLD AUTO: 299 K/UL (ref 150–450)
PMV BLD AUTO: 10.2 FL (ref 9.4–12.3)
POTASSIUM SERPL-SCNC: 3.9 MMOL/L (ref 3.5–5.1)
RBC # BLD AUTO: 4.42 M/UL (ref 4.05–5.2)
SODIUM SERPL-SCNC: 140 MMOL/L (ref 136–145)
WBC # BLD AUTO: 15.7 K/UL (ref 4.3–11.1)

## 2024-12-08 PROCEDURE — 2580000003 HC RX 258: Performed by: STUDENT IN AN ORGANIZED HEALTH CARE EDUCATION/TRAINING PROGRAM

## 2024-12-08 PROCEDURE — 84100 ASSAY OF PHOSPHORUS: CPT

## 2024-12-08 PROCEDURE — 80048 BASIC METABOLIC PNL TOTAL CA: CPT

## 2024-12-08 PROCEDURE — 36415 COLL VENOUS BLD VENIPUNCTURE: CPT

## 2024-12-08 PROCEDURE — 99231 SBSQ HOSP IP/OBS SF/LOW 25: CPT | Performed by: STUDENT IN AN ORGANIZED HEALTH CARE EDUCATION/TRAINING PROGRAM

## 2024-12-08 PROCEDURE — 97166 OT EVAL MOD COMPLEX 45 MIN: CPT

## 2024-12-08 PROCEDURE — 97112 NEUROMUSCULAR REEDUCATION: CPT

## 2024-12-08 PROCEDURE — 6360000002 HC RX W HCPCS: Performed by: STUDENT IN AN ORGANIZED HEALTH CARE EDUCATION/TRAINING PROGRAM

## 2024-12-08 PROCEDURE — 97530 THERAPEUTIC ACTIVITIES: CPT

## 2024-12-08 PROCEDURE — 6370000000 HC RX 637 (ALT 250 FOR IP): Performed by: STUDENT IN AN ORGANIZED HEALTH CARE EDUCATION/TRAINING PROGRAM

## 2024-12-08 PROCEDURE — 85025 COMPLETE CBC W/AUTO DIFF WBC: CPT

## 2024-12-08 PROCEDURE — 2000000000 HC ICU R&B

## 2024-12-08 PROCEDURE — 99233 SBSQ HOSP IP/OBS HIGH 50: CPT | Performed by: INTERNAL MEDICINE

## 2024-12-08 RX ORDER — SODIUM CHLORIDE 9 MG/ML
INJECTION, SOLUTION INTRAVENOUS CONTINUOUS
Status: DISCONTINUED | OUTPATIENT
Start: 2024-12-08 | End: 2024-12-11

## 2024-12-08 RX ADMIN — LEVOTHYROXINE SODIUM 50 MCG: 0.05 TABLET ORAL at 08:08

## 2024-12-08 RX ADMIN — Medication 250 MG: at 08:07

## 2024-12-08 RX ADMIN — SODIUM CHLORIDE: 9 INJECTION, SOLUTION INTRAVENOUS at 22:04

## 2024-12-08 RX ADMIN — DEXAMETHASONE SODIUM PHOSPHATE 6 MG: 10 INJECTION INTRAMUSCULAR; INTRAVENOUS at 16:36

## 2024-12-08 RX ADMIN — BACLOFEN 10 MG: 10 TABLET ORAL at 22:04

## 2024-12-08 RX ADMIN — SODIUM CHLORIDE: 9 INJECTION, SOLUTION INTRAVENOUS at 12:11

## 2024-12-08 RX ADMIN — TAMSULOSIN HYDROCHLORIDE 0.4 MG: 0.4 CAPSULE ORAL at 08:08

## 2024-12-08 RX ADMIN — DEXAMETHASONE SODIUM PHOSPHATE 6 MG: 10 INJECTION INTRAMUSCULAR; INTRAVENOUS at 00:55

## 2024-12-08 RX ADMIN — LEVETIRACETAM 500 MG: 100 INJECTION INTRAVENOUS at 11:36

## 2024-12-08 RX ADMIN — CHOLECALCIFEROL TAB 125 MCG (5000 UNIT) 5000 UNITS: 125 TAB at 08:07

## 2024-12-08 RX ADMIN — DEXAMETHASONE SODIUM PHOSPHATE 6 MG: 10 INJECTION INTRAMUSCULAR; INTRAVENOUS at 05:26

## 2024-12-08 RX ADMIN — ACETAMINOPHEN 650 MG: 325 TABLET ORAL at 22:03

## 2024-12-08 RX ADMIN — SODIUM CHLORIDE, PRESERVATIVE FREE 10 ML: 5 INJECTION INTRAVENOUS at 08:08

## 2024-12-08 RX ADMIN — ACETAMINOPHEN 650 MG: 325 TABLET ORAL at 12:15

## 2024-12-08 RX ADMIN — SODIUM CHLORIDE 40 MG: 9 INJECTION INTRAMUSCULAR; INTRAVENOUS; SUBCUTANEOUS at 11:43

## 2024-12-08 RX ADMIN — SODIUM CHLORIDE, PRESERVATIVE FREE 10 ML: 5 INJECTION INTRAVENOUS at 19:29

## 2024-12-08 RX ADMIN — DEXAMETHASONE SODIUM PHOSPHATE 6 MG: 10 INJECTION INTRAMUSCULAR; INTRAVENOUS at 11:36

## 2024-12-08 RX ADMIN — DEXAMETHASONE SODIUM PHOSPHATE 6 MG: 10 INJECTION INTRAMUSCULAR; INTRAVENOUS at 22:04

## 2024-12-08 RX ADMIN — LEVETIRACETAM 500 MG: 100 INJECTION INTRAVENOUS at 00:55

## 2024-12-08 ASSESSMENT — PAIN DESCRIPTION - LOCATION
LOCATION: HEAD
LOCATION: BACK;HEAD

## 2024-12-08 ASSESSMENT — PAIN SCALES - GENERAL
PAINLEVEL_OUTOF10: 0
PAINLEVEL_OUTOF10: 0
PAINLEVEL_OUTOF10: 3
PAINLEVEL_OUTOF10: 0
PAINLEVEL_OUTOF10: 1
PAINLEVEL_OUTOF10: 2
PAINLEVEL_OUTOF10: 0
PAINLEVEL_OUTOF10: 3
PAINLEVEL_OUTOF10: 0

## 2024-12-08 ASSESSMENT — PAIN DESCRIPTION - FREQUENCY: FREQUENCY: INTERMITTENT

## 2024-12-08 ASSESSMENT — PAIN DESCRIPTION - DESCRIPTORS
DESCRIPTORS: ACHING;DULL
DESCRIPTORS: ACHING;THROBBING

## 2024-12-08 ASSESSMENT — PAIN DESCRIPTION - PAIN TYPE: TYPE: ACUTE PAIN

## 2024-12-08 NOTE — PROGRESS NOTES
Pt easily arouses to name. Pt did not want to eat breakfast. Drank very little water. Denies headache. No changes in neuro status.

## 2024-12-08 NOTE — PROGRESS NOTES
Pt took out her dentures and they were in the bed. Uppers were found and placed in denture cup in water. Family made aware that I did not disturb pt to look for lowers.

## 2024-12-08 NOTE — PLAN OF CARE
Problem: Confusion  Goal: Confusion, delirium, dementia, or psychosis is improved or at baseline  Description: INTERVENTIONS:  1. Assess for possible contributors to thought disturbance, including medications, impaired vision or hearing, underlying metabolic abnormalities, dehydration, psychiatric diagnoses, and notify attending LIP  2. [place] high risk fall precautions, as indicated  3. Provide frequent short contacts to provide reality reorientation, refocusing and direction  4. Decrease environmental stimuli, including noise as appropriate  5. Monitor and intervene to maintain adequate nutrition, hydration, elimination, sleep and activity  6. If unable to ensure safety without constant attention obtain sitter and review sitter guidelines with assigned personnel  7. Initiate Psychosocial CNS and Spiritual Care consult, as indicated  12/8/2024 0981 by Rony Aragon, RN  Outcome: Not Progressing  Flowsheets (Taken 12/8/2024 0730)  Effect of thought disturbance (confusion, delirium, dementia, or psychosis) are managed with adequate functional status: Assess for contributors to thought disturbance, including medications, impaired vision or hearing, underlying metabolic abnormalities, dehydration, psychiatric diagnoses, notify LIP  12/7/2024 1953 by Ariane Russell, RN  Outcome: Progressing

## 2024-12-08 NOTE — PROGRESS NOTES
Hospitalist Progress Note   Admit Date:  2024  7:34 PM   Name:  Haven Hui   Age:  79 y.o.  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  Patient underwent MRI and was evaluated by her oncologist Dr. Johnson with the abnormal results which showed multiple lesions in the brain with associated vasogenic edema concerning for surrounding hemorrhage.  Patient has been having headaches over the past few weeks which prompted her to have imaging done.  Patient does take Xarelto due to history of pulmonary embolism.  Patient also complained of some blurry vision as well as numbness to her right proximal upper extremity.  Patient status post IV Decadron 10 mg in the ED.  Repeat CT showed bilateral brain mets with extensive vasogenic edema with 4 mm left to right shift.  Neurosurgery was contacted by ED provider.  MRI with multiple lesions in the brain with associated vasogenic edema.  Concern for surrounding hemorrhage  - Repeat CT with bilateral brain mets with extensive vasogenic edema.  4 mm left-to-right shift      Subjective & 24hr Events:   Patient was seen and examined at the bedside.  She was very pleasant lady. Discussed with the RN at the bedside.      Assessment & Plan:   Vasogenic edema (HCC) and Metastasis to brain  Repeat CT head is stable  S/p 10 mg IV Decadron in ED.  Increased  Decadron 6 mg every 6 hours.  - Keep SBP < 140  - Every 2 hours neuro checks.  Neurosurgery on board, appreciate recommendations  [facility] was consulted, appreciate    Result Value Ref Range    Sodium 140 136 - 145 mmol/L    Potassium 3.9 3.5 - 5.1 mmol/L    Chloride 104 98 - 107 mmol/L    CO2 23 20 - 29 mmol/L    Anion Gap 14 7 - 16 mmol/L    Glucose 140 (H) 70 - 99 mg/dL    BUN 23 8 - 23 MG/DL    Creatinine 0.72 0.60 - 1.10 MG/DL    Est, Glom Filt Rate 86 >60 ml/min/1.73m2    Calcium 9.3 8.8 - 10.2 MG/DL   Phosphorus    Collection Time: 12/08/24  5:37 AM   Result Value Ref Range    Phosphorus 3.1 2.5 - 4.5 MG/DL       No results for input(s): \"COVID19\" in the last 72 hours.    Current Meds:  Current Facility-Administered Medications   Medication Dose Route Frequency    pantoprazole (PROTONIX) 40 mg in sodium chloride (PF) 0.9 % 10 mL injection  40 mg IntraVENous Daily    0.9 % sodium chloride infusion   IntraVENous Continuous    naloxone (NARCAN) injection 0.2 mg  0.2 mg IntraVENous PRN    tamsulosin (FLOMAX) capsule 0.4 mg  0.4 mg Oral Daily    levothyroxine (SYNTHROID) tablet 50 mcg  50 mcg Oral QAM AC    dexAMETHasone (DECADRON) injection 6 mg  6 mg IntraVENous Q6H    medicated lip ointment (BLISTEX)   Topical PRN    vitamin C tablet 250 mg  250 mg Oral Daily    baclofen (LIORESAL) tablet 10 mg  10 mg Oral Nightly PRN    capecitabine (XELODA) chemo tablet 1,500 mg- patient supplied (Patient Supplied)  1,000 mg/m2 (Order-Specific) Oral BID PC    vitamin D3 (CHOLECALCIFEROL) tablet 5,000 Units  5,000 Units Oral Daily    [Held by provider] rivaroxaban (XARELTO) tablet 20 mg  20 mg Oral Daily with breakfast    sodium chloride flush 0.9 % injection 5-40 mL  5-40 mL IntraVENous 2 times per day    sodium chloride flush 0.9 % injection 5-40 mL  5-40 mL IntraVENous PRN    0.9 % sodium chloride infusion   IntraVENous PRN    potassium chloride (KLOR-CON M) extended release tablet 40 mEq  40 mEq Oral PRN    Or    potassium bicarb-citric acid (EFFER-K) effervescent tablet 40 mEq  40 mEq Oral PRN    Or    potassium chloride 10 mEq/100 mL IVPB (Peripheral Line)  10 mEq IntraVENous PRN

## 2024-12-08 NOTE — PROGRESS NOTES
[facility]        Inpatient Hematology / Oncology Progress Note    24 Hour Events:  Afebrile, VSS, on room air.  More awake today compared to yesterday.  Family at bedside and more traveling here.   XRT referral has been made.   Patient open to hearing options from hospice.    Neurosurgery recs supportive care and XRT.       ROS:  Constitutional: Positive for fatigue; negative for fever, chills.  CV: Negative for chest pain, palpitations, edema.  Respiratory: Negative for dyspnea, cough, wheezing.  GI: Negative for nausea, abdominal pain, diarrhea.    10 point review of systems is otherwise negative with the exception of the elements mentioned above in the HPI.          No Known Allergies  Past Medical History:   Diagnosis Date    Anemia     Rx iron    Cecal cancer (HCC)     History of pulmonary embolism     on Xarelto    Hypothyroidism     managed with medication    Malignant neoplasm of ascending colon (HCC)     Primary colon cancer with metastasis to 7 or more regional lymph nodes (N2b) (HCC)      Past Surgical History:   Procedure Laterality Date    APPENDECTOMY  11/15/23    CATARACT REMOVAL Bilateral 2021    COLONOSCOPY N/A 5/20/2024    COLORECTAL CANCER SCREENING, NOT HIGH RISK performed by Saul Kc MD at [facility] ENDOSCOPY    HERNIA REPAIR Right 07/20/2023    LAPAROSCOPIC RIGHT INGUINAL HERNIA REPAIR WITH MESH performed by Romel Ricks MD at [facility] MAIN OR    LAPAROSCOPIC APPENDECTOMY N/A 11/15/2023    ATTEMPTED APPENDECTOMY LAPAROSCOPIC CONVERTED TO OPEN; ILEOCYSTECTOMY WITH APPENDECTOMY, ILEOCOLOSTOMY, DRAINAGE OF PERITONITIS performed by Sabas Hernandez MD at [facility] MAIN OR    MOUTH SURGERY      SMALL INTESTINE SURGERY  11/15/23    TUMOR REMOVAL      US LYMPH NODE BIOPSY  11/4/2024    US LYMPH NODE BIOPSY 11/4/2024 Sabas Bliss MD [facility] RADIOLOGY US     Family History   Problem Relation Age of Onset    Alcohol Abuse Mother         no comment    Stroke Mother          with breakfast Darron Wilson MD        sodium chloride flush 0.9 % injection 5-40 mL  5-40 mL IntraVENous 2 times per day Darron Wilson MD   10 mL at 12/08/24 0808    sodium chloride flush 0.9 % injection 5-40 mL  5-40 mL IntraVENous PRN Darron Wilson MD        0.9 % sodium chloride infusion   IntraVENous PRN Darron Wilson MD        potassium chloride (KLOR-CON M) extended release tablet 40 mEq  40 mEq Oral PRN Darron Wilson MD        Or    potassium bicarb-citric acid (EFFER-K) effervescent tablet 40 mEq  40 mEq Oral PRN Darron Wilson MD        Or    potassium chloride 10 mEq/100 mL IVPB (Peripheral Line)  10 mEq IntraVENous PRN Darron Wilson MD        magnesium sulfate 2000 mg in 50 mL IVPB premix  2,000 mg IntraVENous PRN Darron Wilson MD        ondansetron (ZOFRAN-ODT) disintegrating tablet 4 mg  4 mg Oral Q8H PRN Darron Wilson MD        Or    ondansetron (ZOFRAN) injection 4 mg  4 mg IntraVENous Q6H PRN Darron Wilson MD   4 mg at 12/06/24 2329    polyethylene glycol (GLYCOLAX) packet 17 g  17 g Oral Daily PRN Darron Wilson MD        acetaminophen (TYLENOL) tablet 650 mg  650 mg Oral Q6H PRN Darron Wilson MD   650 mg at 12/08/24 1215    Or    acetaminophen (TYLENOL) suppository 650 mg  650 mg Rectal Q6H PRN Darron Wilson MD        hydrALAZINE (APRESOLINE) injection 10 mg  10 mg IntraVENous Q6H PRN Darron Wilson MD   10 mg at 12/06/24 2251    dicyclomine (BENTYL) capsule 10 mg  10 mg Oral 4x Daily PRN Darron Wilson MD        morphine injection 2 mg  2 mg IntraVENous Q2H PRN Darron Wilson MD   2 mg at 12/06/24 2307    levETIRAcetam (KEPPRA) injection 500 mg  500 mg IntraVENous Q12H Luis Alfredo Tran MD   500 mg at 12/08/24 1136       OBJECTIVE:  Patient Vitals for the past 8 hrs:   BP Temp Pulse Resp SpO2   12/08/24 1545 (!) 121/56 -- 63 (!) 32 95 %   12/08/24 1530 122/63 -- 65 16 95 %   12/08/24 1500 (!) 122/57 -- 70 22 95 %   12/08/24 1430 (!) 120/59 -- 70 24

## 2024-12-08 NOTE — CARE COORDINATION
COPY / PASTE:  Patient lives alone and does not have local family but friends who are very supportive. Patient is insured and established with primary care. She does not use assistive devices to ambulate, denies any falls, and is independent in her ADLs. Patient reports feeling weaker than baseline these last few days but has been able to manage her ADLS and ambulating without difficulty.  Patient / family open to hearing options from hospice.  Referral made to [facility].  They will reach out to family and schedule a meeting to discuss options for hospice.  CM will continue to follow and remain available for any needs that may arise.           12/08/24 1493   Service Assessment   History Provided By Medical Record   Primary Caregiver Self   Support Systems Children;Family Members   Patient's Healthcare Decision Maker is: Legal Next of Kin   PCP Verified by CM Yes   Last Visit to PCP Within last 3 months   Prior Functional Level Independent in ADLs/IADLs   Current Functional Level Independent in ADLs/IADLs   Can patient return to prior living arrangement Yes   Ability to make needs known: Good   Family able to assist with home care needs: Yes   Would you like for me to discuss the discharge plan with any other family members/significant others, and if so, who? Yes   Financial Resources Medicare   Community Resources None   Social/Functional History   Lives With Alone  (Simultaneous filing. User may not have seen previous data.)   Type of Home Condo  (Simultaneous filing. User may not have seen previous data.)   Home Layout Two level;Bed/Bath upstairs  (Simultaneous filing. User may not have seen previous data.)   Home Access Stairs to enter with rails  (Simultaneous filing. User may not have seen previous data.)   Bathroom Toilet Standard   Bathroom Accessibility Accessible   Home Equipment None   Receives Help From Family   Prior Level of Assist for ADLs Independent   Prior Level of Assist for  Homemaking Independent   Homemaking Responsibilities Yes   Ambulation Assistance Independent   Prior Level of Assist for Transfers Independent   Active  No   Mode of Transportation Car   Occupation Retired   Discharge Planning   Type of Residence House   Living Arrangements Alone   Current Services Prior To Admission None   Potential Assistance Needed N/A   DME Ordered? No   Type of Home Care Services None   Patient expects to be discharged to: House   One/Two Story Residence One story   History of falls? 0   Services At/After Discharge   Transition of Care Consult (CM Consult) Discharge Planning    Resource Information Provided? No

## 2024-12-08 NOTE — PROGRESS NOTES
[facility]    Assessment: Ms. Hui is a 79 y.o. 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  Protonix started  -Continue Keppra  -Discussed with oncology and also patient's daughter.  Patient has multiple intraparenchymal lesions the largest of the lesion being on the left frontal region.  Patient this morning seem to be more somnolent than yesterday.  Given patient's tumor burden I do not think surgery will improve patient's outcome.  At this point I recommend continue medical treatment.  -Will defer treatment plan to oncology/radiation oncology      Subjective: Patient more awake today than yesterday.  Currently on Decadron 6 q 6.    Objective:    Neurologic Exam:  Afebrile  Somnolent but wakes up to voice  Aox2 (oriented to self and place not to time)  Mild expressive aphasia  Pupils 3 and brisk b/l  FS  TML  Fcx4   Unable to check pronator drift      Vitals:    12/08/24 1000   BP: 139/61   Pulse: 67   Resp: 18   Temp:    SpO2: 93%       Luis Alfredo Tran MD

## 2024-12-08 NOTE — PROGRESS NOTES
ACUTE OCCUPATIONAL THERAPY GOALS:   (Developed with and agreed upon by patient and/or caregiver.)  1. Patient will complete upper body bathing and dressing with SBA and adaptive equipment as needed.   2. Patient will complete toileting with minimal assistance.   3. Patient will tolerate 25 minutes of OT treatment with 1-2 rest breaks to increase activity tolerance for ADLs.   4. Patient will complete functional transfers with minimal assistance and adaptive equipment as needed.   5. Patient will complete sitting balance edge of bed with supervision while participating in ADL.  6. Patient will complete therapeutic exercises for 8 minutes to improve strength for ADL/functional transfers.     Timeframe: 7 visits       OCCUPATIONAL THERAPY Initial Assessment, Daily Note, and PM       OT Visit Days: 1  Acknowledge Orders  Time  OT Charge Capture  Rehab Caseload Tracker      Hvaen Hui is a 79 y.o. 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 Hui presents to the hospital with metastatic adenocarcinoma to the brain and vasogenic edema. Pt is supine in the bed with supportive friend at bedside. Pt is very pleasant and cooperative and reports she would like to move some. Pt lives alone in a two story condo and reports that she has been independent until recently. Friend reports she hasn't been eating/drinking much lately. Pt reports 7/10 pain in the back of her head. Pt's /63 in supine (per MD note, keep SBP<140). Pt reports new weakness and numbness  seen previous data.)  Home Layout: One level (Simultaneous filing. User may not have seen previous data.)  Home Access: Level entry (Simultaneous filing. User may not have seen previous data.)  Entrance Stairs - Number of Steps: 6  Bathroom Shower/Tub: Tub/Shower unit  Bathroom Toilet: Standard  Bathroom Accessibility: Accessible  Home Equipment: None  Receives Help From: Family  Prior Level of Assist for ADLs: Independent  Prior Level of Assist for Homemaking: Independent  Homemaking Responsibilities: Yes  Prior Level of Assist for Transfers: Independent  Active : No  Mode of Transportation: Car  Occupation: Retired    OBJECTIVE:     LINES / DRAINS / AIRWAY: [place]cano Catheter, IV, and ICU monitoring     RESTRICTIONS/PRECAUTIONS:  Restrictions/Precautions: Fall Risk    PAIN: VITALS / O2:   Pre Treatment:7/10 back of head         Post Treatment: same        Vitals          Oxygen            GROSS EVALUATION: INTACT IMPAIRED   (See Comments)   UE AROM [x] []WFL   UE PROM [] []   Strength []  Feels slightly weaker on the R side but still able to use it to assist with functional tasks      Posture / Balance []  Fair posture; fair sitting balance; poor standing    Sensation []  Decreased proximal sensation in the R UE    Coordination []  Tremor noted briefly in the R UE     Tone []       Edema []    Activity Tolerance []  Limited by pain/dizziness      Hand Dominance R [] L []      COGNITION/  PERCEPTION: INTACT IMPAIRED   (See Comments)   Orientation [x]     Vision []  C/o blurry vision; sensitive to light    Hearing [x]     Cognition  []  Speech difficulty at times    Perception []       MOBILITY: I Mod I S SBA CGA Min Mod Max Total  NT x2 Comments:   Bed Mobility    Rolling [] [] [] [] [] [] [x] [] [] [] []    Supine to Sit [] [] [] [] [] [] [x] [] [] [] []    Scooting [] [] [] [] [] [] [x] [] [] [] []    Sit to Supine [] [] [] [] [] [] [x] [] [] [] []    Transfers    Sit to Stand [] [] [] [] [] [] [] [x] [] [] []

## 2024-12-09 ENCOUNTER — APPOINTMENT (OUTPATIENT)
Dept: CT IMAGING | Age: 79
DRG: 081 | End: 2024-12-09
Payer: MEDICARE

## 2024-12-09 ENCOUNTER — TELEPHONE (OUTPATIENT)
Dept: ONCOLOGY | Age: 79
End: 2024-12-09

## 2024-12-09 PROBLEM — R53.81 DEBILITY: Status: ACTIVE | Noted: 2024-12-09

## 2024-12-09 PROBLEM — Z51.5 ENCOUNTER FOR PALLIATIVE CARE: Status: ACTIVE | Noted: 2024-12-09

## 2024-12-09 PROBLEM — R54 FRAILTY: Status: ACTIVE | Noted: 2024-12-09

## 2024-12-09 LAB
ANION GAP SERPL CALC-SCNC: 13 MMOL/L (ref 7–16)
BASOPHILS # BLD: 0 K/UL (ref 0–0.2)
BASOPHILS NFR BLD: 0 % (ref 0–2)
BUN SERPL-MCNC: 23 MG/DL (ref 8–23)
CALCIUM SERPL-MCNC: 8.6 MG/DL (ref 8.8–10.2)
CHLORIDE SERPL-SCNC: 106 MMOL/L (ref 98–107)
CO2 SERPL-SCNC: 21 MMOL/L (ref 20–29)
CREAT SERPL-MCNC: 0.64 MG/DL (ref 0.6–1.1)
DIFFERENTIAL METHOD BLD: ABNORMAL
EOSINOPHIL # BLD: 0 K/UL (ref 0–0.8)
EOSINOPHIL NFR BLD: 0 % (ref 0.5–7.8)
ERYTHROCYTE [DISTWIDTH] IN BLOOD BY AUTOMATED COUNT: 12.7 % (ref 11.9–14.6)
GLUCOSE SERPL-MCNC: 139 MG/DL (ref 70–99)
HCT VFR BLD AUTO: 37.6 % (ref 35.8–46.3)
HGB BLD-MCNC: 12.9 G/DL (ref 11.7–15.4)
IMM GRANULOCYTES # BLD AUTO: 0.1 K/UL (ref 0–0.5)
IMM GRANULOCYTES NFR BLD AUTO: 1 % (ref 0–5)
LYMPHOCYTES # BLD: 0.7 K/UL (ref 0.5–4.6)
LYMPHOCYTES NFR BLD: 6 % (ref 13–44)
MCH RBC QN AUTO: 32.3 PG (ref 26.1–32.9)
MCHC RBC AUTO-ENTMCNC: 34.3 G/DL (ref 31.4–35)
MCV RBC AUTO: 94.2 FL (ref 82–102)
MONOCYTES # BLD: 0.4 K/UL (ref 0.1–1.3)
MONOCYTES NFR BLD: 3 % (ref 4–12)
NEUTS SEG # BLD: 10.2 K/UL (ref 1.7–8.2)
NEUTS SEG NFR BLD: 90 % (ref 43–78)
NRBC # BLD: 0 K/UL (ref 0–0.2)
PHOSPHATE SERPL-MCNC: 2.7 MG/DL (ref 2.5–4.5)
PLATELET # BLD AUTO: 277 K/UL (ref 150–450)
PMV BLD AUTO: 10.2 FL (ref 9.4–12.3)
POTASSIUM SERPL-SCNC: 4.2 MMOL/L (ref 3.5–5.1)
RBC # BLD AUTO: 3.99 M/UL (ref 4.05–5.2)
SARS-COV-2 RDRP RESP QL NAA+PROBE: NOT DETECTED
SODIUM SERPL-SCNC: 139 MMOL/L (ref 136–145)
SOURCE: NORMAL
WBC # BLD AUTO: 11.3 K/UL (ref 4.3–11.1)

## 2024-12-09 PROCEDURE — 99231 SBSQ HOSP IP/OBS SF/LOW 25: CPT | Performed by: STUDENT IN AN ORGANIZED HEALTH CARE EDUCATION/TRAINING PROGRAM

## 2024-12-09 PROCEDURE — 84100 ASSAY OF PHOSPHORUS: CPT

## 2024-12-09 PROCEDURE — 97112 NEUROMUSCULAR REEDUCATION: CPT

## 2024-12-09 PROCEDURE — 51702 INSERT TEMP BLADDER CATH: CPT

## 2024-12-09 PROCEDURE — 1100000000 HC RM PRIVATE

## 2024-12-09 PROCEDURE — 6360000002 HC RX W HCPCS: Performed by: STUDENT IN AN ORGANIZED HEALTH CARE EDUCATION/TRAINING PROGRAM

## 2024-12-09 PROCEDURE — APPSS30 APP SPLIT SHARED TIME 16-30 MINUTES: Performed by: NURSE PRACTITIONER

## 2024-12-09 PROCEDURE — 2580000003 HC RX 258: Performed by: STUDENT IN AN ORGANIZED HEALTH CARE EDUCATION/TRAINING PROGRAM

## 2024-12-09 PROCEDURE — 2400000000

## 2024-12-09 PROCEDURE — 1170000000 HC RM PRIVATE ONCOLOGY

## 2024-12-09 PROCEDURE — 80048 BASIC METABOLIC PNL TOTAL CA: CPT

## 2024-12-09 PROCEDURE — 70450 CT HEAD/BRAIN W/O DYE: CPT

## 2024-12-09 PROCEDURE — 85025 COMPLETE CBC W/AUTO DIFF WBC: CPT

## 2024-12-09 PROCEDURE — 6370000000 HC RX 637 (ALT 250 FOR IP): Performed by: STUDENT IN AN ORGANIZED HEALTH CARE EDUCATION/TRAINING PROGRAM

## 2024-12-09 PROCEDURE — 36415 COLL VENOUS BLD VENIPUNCTURE: CPT

## 2024-12-09 PROCEDURE — 87635 SARS-COV-2 COVID-19 AMP PRB: CPT

## 2024-12-09 PROCEDURE — 97530 THERAPEUTIC ACTIVITIES: CPT

## 2024-12-09 PROCEDURE — 99232 SBSQ HOSP IP/OBS MODERATE 35: CPT | Performed by: INTERNAL MEDICINE

## 2024-12-09 PROCEDURE — 97535 SELF CARE MNGMENT TRAINING: CPT

## 2024-12-09 PROCEDURE — 99497 ADVNCD CARE PLAN 30 MIN: CPT | Performed by: INTERNAL MEDICINE

## 2024-12-09 PROCEDURE — 99222 1ST HOSP IP/OBS MODERATE 55: CPT | Performed by: INTERNAL MEDICINE

## 2024-12-09 RX ADMIN — SODIUM CHLORIDE 40 MG: 9 INJECTION INTRAMUSCULAR; INTRAVENOUS; SUBCUTANEOUS at 07:27

## 2024-12-09 RX ADMIN — MORPHINE SULFATE 2 MG: 2 INJECTION, SOLUTION INTRAMUSCULAR; INTRAVENOUS at 16:22

## 2024-12-09 RX ADMIN — LEVETIRACETAM 500 MG: 100 INJECTION INTRAVENOUS at 11:46

## 2024-12-09 RX ADMIN — LEVETIRACETAM 500 MG: 100 INJECTION INTRAVENOUS at 23:30

## 2024-12-09 RX ADMIN — DEXAMETHASONE SODIUM PHOSPHATE 6 MG: 10 INJECTION INTRAMUSCULAR; INTRAVENOUS at 05:47

## 2024-12-09 RX ADMIN — LEVOTHYROXINE SODIUM 50 MCG: 0.05 TABLET ORAL at 07:25

## 2024-12-09 RX ADMIN — Medication 250 MG: at 07:25

## 2024-12-09 RX ADMIN — SODIUM CHLORIDE, PRESERVATIVE FREE 10 ML: 5 INJECTION INTRAVENOUS at 20:24

## 2024-12-09 RX ADMIN — CHOLECALCIFEROL TAB 125 MCG (5000 UNIT) 5000 UNITS: 125 TAB at 07:25

## 2024-12-09 RX ADMIN — DEXAMETHASONE SODIUM PHOSPHATE 6 MG: 10 INJECTION INTRAMUSCULAR; INTRAVENOUS at 16:22

## 2024-12-09 RX ADMIN — DEXAMETHASONE SODIUM PHOSPHATE 6 MG: 10 INJECTION INTRAMUSCULAR; INTRAVENOUS at 23:28

## 2024-12-09 RX ADMIN — LEVETIRACETAM 500 MG: 100 INJECTION INTRAVENOUS at 00:27

## 2024-12-09 RX ADMIN — SODIUM CHLORIDE: 9 INJECTION, SOLUTION INTRAVENOUS at 21:25

## 2024-12-09 RX ADMIN — SODIUM CHLORIDE, PRESERVATIVE FREE 10 ML: 5 INJECTION INTRAVENOUS at 07:30

## 2024-12-09 RX ADMIN — TAMSULOSIN HYDROCHLORIDE 0.4 MG: 0.4 CAPSULE ORAL at 07:25

## 2024-12-09 RX ADMIN — SODIUM CHLORIDE: 9 INJECTION, SOLUTION INTRAVENOUS at 08:54

## 2024-12-09 RX ADMIN — DEXAMETHASONE SODIUM PHOSPHATE 6 MG: 10 INJECTION INTRAMUSCULAR; INTRAVENOUS at 11:46

## 2024-12-09 ASSESSMENT — PAIN DESCRIPTION - LOCATION: LOCATION: HEAD

## 2024-12-09 ASSESSMENT — PAIN SCALES - GENERAL
PAINLEVEL_OUTOF10: 7
PAINLEVEL_OUTOF10: 0

## 2024-12-09 ASSESSMENT — PAIN DESCRIPTION - DESCRIPTORS: DESCRIPTORS: ACHING

## 2024-12-09 NOTE — CONSULTS
Patient: Haven Hui MRN: [id number]  SSN: [id number]    YOB: 1945  Age: 79 y.o.  Sex: female       Date of Request: 1/9/2024  Date of Consult:  12/9/2024  Reason for Consult:  goals of care and medical decision making  Requesting Physician: Dr. Christianson     Assessment/Plan:     Principal Diagnosis:    Debility, Unspecified  R53.81    Additional Diagnoses:   Frailty  R54  Counseling, Encounter for Medical Advice  Z71.9  Encounter for Palliative Care  Z51.5    Palliative Performance Scale (PPS):       Medical Decision Making:   Reviewed and summarized labs and imaging from admission.  Met with pt at bedside.  She feels better overall. Understands her diagnosis and is hopeful to undergo radiation treatments and have some time to travel with family.  Discussed code status and pt wishes for DNR.  Order placed.  She has 2 children, Sara and Darron, that would be decision makers should she ever be unable to make decisions.  Difficult prognostication with plans for radiation therapy but high risk for events leading to sudden decline.      I spent greater than 25 minutes on advanced care planning.       Will discuss findings with members of the interdisciplinary team.      Thank you for this referral.         Subjective:     History obtained from:  Patient, Family, and Chart    Chief Complaint: fatigue  History of Present Illness:   79 y.o. 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  Patient underwent MRI and was evaluated by her oncologist Dr. Johnson with the abnormal results which showed multiple lesions in the brain with associated vasogenic edema concerning for surrounding hemorrhage.  Patient has been having headaches over the past few weeks which prompted her to have imaging done.  Patient does take Xarelto due to history  capsule Take 1 capsule by mouth 4 times daily (before meals and nightly)  Patient not taking: Reported on 12/6/2024 6/17/24   Jade Dyer MD   Cholecalciferol (VITAMIN D-3 PO) Take 2,000 Units by mouth daily    ProviderMonet MD   Multiple Vitamin (MULTIVITAMIN ADULT PO) Take by mouth daily    Provider, MD Monet       No Known Allergies     Comprehensive review of systems completed and negative with exception of noted above     Objective:     Visit Vitals  BP (!) 115/57   Pulse 72   Temp 98.6 °F (37 °C) (Oral)   Resp 19   Ht 1.575 m (5' 2.01\")   Wt 54.4 kg (120 lb)   SpO2 93%   BMI 21.94 kg/m²        Physical Exam:    General:  Cooperative. frail   Eyes:  Conjunctivae/corneas clear    Nose: Nares normal. Septum midline.   Neck: Supple, symmetrical, trachea midline, no JVD   Lungs:   Clear to auscultation bilaterally, unlabored   Heart:  Regular rate and rhythm, no murmur    Abdomen:   Soft, non-tender, non-distended. Positive bowel sounds   Extremities: Normal, atraumatic, no cyanosis or edema   Skin: Skin color, texture, turgor normal. No rash or lesions.   Neurologic: Nonfocal   Psych: Alert and oriented       Signed By: Meet Silverman MD     December 9, 2024

## 2024-12-09 NOTE — PROGRESS NOTES
TRANSFER - OUT REPORT:    Verbal report given to KAYCEE Chavarria on Haven Hui  being transferred to [facility] for routine progression of patient care       Report consisted of patient's Situation, Background, Assessment and   Recommendations(SBAR).     Information from the following report(s) Nurse Handoff Report, Adult Overview, MAR, Recent Results, and Cardiac Rhythm SB/NSR  was reviewed with the receiving nurse.           Lines:   Peripheral IV 12/06/24 Proximal;Right;Anterior Forearm (Active)   Site Assessment Clean, dry & intact 12/09/24 1130   Line Status Blood return noted;Infusing 12/09/24 1130   Line Care Connections checked and tightened 12/09/24 1130   Phlebitis Assessment No symptoms 12/09/24 1130   Infiltration Assessment 0 12/09/24 1130   Alcohol Cap Used Yes 12/09/24 1130   Dressing Status Clean, dry & intact 12/09/24 1130   Dressing Type Transparent 12/09/24 1130       Peripheral IV 12/09/24 Posterior;Right Forearm (Active)   Site Assessment Clean, dry & intact 12/09/24 1602   Line Status Flushed;Brisk blood return;Capped 12/09/24 1602   Line Care Connections checked and tightened 12/09/24 1602   Phlebitis Assessment No symptoms 12/09/24 1602   Infiltration Assessment 0 12/09/24 1602   Alcohol Cap Used Yes 12/09/24 1602   Dressing Status New dressing applied;Clean, dry & intact 12/09/24 1602   Dressing Type Transparent 12/09/24 1602   Dressing Intervention New 12/09/24 1602        Opportunity for questions and clarification was provided.      Patient transported with:  Registered Nurse

## 2024-12-09 NOTE — PROGRESS NOTES
attempted to visit patient.  Patient appeared to be sleeping comfortably at time.  provided prayer and is available to follow up.  Peace be with you,  Signed by  ZAIN PalmaDiv.   [phone]

## 2024-12-09 NOTE — TELEPHONE ENCOUNTER
Physician provider: Dr. Johnson  Reason for today's call (Please detail here patients chief complaint): Next steps  Last office visit:na  Patient Callback Number: [phone]   Was callback number verified?: Yes  Preferred pharmacy (If refill request): na  Calls to office within the last 48 hours?:No    Patient notified that their information will be routed to the [facility] clinical triage team for review. Patient is advised that they will receive a phone call from the triage department. If symptom related and symptoms worsen before receiving a call back, the patient has been advised to proceed to the nearest ED.      Pt is in ICU & her son Darron alarcon like to know when he and he is family will be able to speak with Dr. Johnson, in person, concerning next steps.    More specifically he would like to know when Dr. Johnson will be making rounds and will see pt., and is hoping it's today.     [phone]

## 2024-12-09 NOTE — CARE COORDINATION
Chart reviewed and pt discussed in am IDR. Continues ICU and awaiting consult with [facility] per notes with family. Family coming from out of town. alex Urena will be meeting at 1130 am esperanza. CM following for RADHA. LOS 3 days.     Per RN, pt going to cancer center for radiation onc appt at noon tomorrow and they have set up transport. Packet completed and RN has for transport.     alex Urena meeting with family for informational meeting regarding hospice care.

## 2024-12-09 NOTE — PROGRESS NOTES
TRANSFER - IN REPORT:    Verbal report received from KAYCEE Santiago on Haven Hui  being received from ICU for routine progression of patient care      Report consisted of patient's Situation, Background, Assessment and   Recommendations(SBAR).     Information from the following report(s) Nurse Handoff Report was reviewed with the receiving nurse.    Opportunity for questions and clarification was provided.      .

## 2024-12-09 NOTE — PROGRESS NOTES
CGA Min Mod Max Total NT Comments   BASIC ADLs:              Upper Body   Bathing [] [] [] [] [] [] [] [] [] []     Lower Body Bathing [] [] [] [] [] [] [] [] [] []     Toileting [] [] [] [] [] [] [] [] [] []    Upper Body Dressing [] [] [] [] [] [] [] [] [] []    Lower Body Dressing [] [] [] [] [] [] [] [] [] []    Feeding [] [] [] [] [] [] [] [] [] []    Grooming [] [] [] [x] [] [] [] [] [] [] Brushing teeth in the chair    Personal Device Care [] [] [] [] [] [] [] [] [] []    Functional Mobility [] [] [] [] [] [x] [x] [] [] [] X 2 using rolling walker    I=Independent, Mod I=Modified Independent, S=Supervision/Setup, SBA=Standby Assistance, CGA=Contact Guard Assistance, Min=Minimal Assistance, Mod=Moderate Assistance, Max=Maximal Assistance, Total=Total Assistance, NT=Not Tested    BALANCE: Good Fair+ Fair Fair- Poor NT Comments   Sitting Static [] [x] [] [] [] []    Sitting Dynamic [] [x] [] [] [] []              Standing Static [] [] [x] [] [] []    Standing Dynamic [] [] [] [x] [] []        PLAN:     FREQUENCY/DURATION   OT Plan of Care: 3 times/week for duration of hospital stay or until stated goals are met, whichever comes first.    TREATMENT:     TREATMENT:   Co-Treatment PT/OT necessary due to patient's decreased overall endurance/tolerance levels, as well as need for high level skilled assistance to complete functional transfers/mobility and functional tasks  Neuromuscular Re-education (15 Minutes): Patient participated in neuromuscular re-education including functional reaching, weight shifting, postural training, standing tolerance activity , and sitting balance activity   with minimal and moderate assistance, verbal cues, tactile cues, visual cues, education, and adaptive equipment to improve sitting balance, standing balance, posture, coordination, static balance, and dynamic balance in order to prepare for functional task, prepare for seated ADLs, prepare for functional transfer, increase safety

## 2024-12-09 NOTE — TELEPHONE ENCOUNTER
Returned call to patient's son.  Informed son that Dr. Corral is the rounding MD for inpatient, but would forward request to Dr. Johnson's team.

## 2024-12-09 NOTE — INTERDISCIPLINARY ROUNDS
Multi-D Rounds/Checklist (leapfrog):  Lines: can any be removed?: None    Urinary Catheter 12/07/24 2 Way (Active)      DVT Prophylaxis: Contraindicated  Vent: N/A  Nutrition Ordered/appropriate: Ordered  Can antibiotics or other drugs be stopped? N/A Yes/No  MRSA swab:   Inpat Anti-Infectives (From admission, onward)      None          Consults needed: None  A: Is pain control adequate? (has PRNs? Stop drip?) N/A  B: Sedation break and SBT? N/A  C: Is sedation choice appropriate? N/A  D: Delirium/CAM-ICU? No  E: Mobility goals/appropriateness? Yes  F: Family update and plan? Children are surrogate decision makers and is being updated daily by primary attending and nursing staff.    Laura Bellamy, APRN - CNP

## 2024-12-09 NOTE — PROGRESS NOTES
[facility]    Assessment: Ms. Hui is a 79 y.o. 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  Daily Protonix  -Continue Keppra  -Discussed with oncology and also patient's daughter.  Patient has multiple intraparenchymal lesions the largest of the lesion being on the left frontal region.  Patient this morning seem to be more somnolent than yesterday.  Given patient's tumor burden I do not think surgery will improve patient's outcome.  At this point I recommend continue medical treatment.  -Will defer treatment plan to oncology/radiation oncology  -Family may be interested in getting more information about hospice care  -At this point there is no acute neurosurgical intervention indicated.  Please reach out to neurosurgery for any questions.      Subjective: Patient a lot more awake this morning.  Patient is on board since that eating.    Objective:    Neurologic Exam:  Afebrile  Aox3   Pupils 3 and brisk b/l  FS  TML  Fcx4   Unable to check pronator drift      Vitals:    12/09/24 1000   BP: 123/63   Pulse: 66   Resp: 16   Temp:    SpO2: 93%       Luis Alfredo Tran MD

## 2024-12-09 NOTE — PROGRESS NOTES
Assistance, CGA=Contact Guard Assistance,   Min=Minimal Assistance, Mod=Moderate Assistance, Max=Maximal Assistance, Total=Total Assistance, NT=Not Tested    BALANCE: Good Fair+ Fair Fair- Poor NT Comments   Sitting Static [] [x] [] [] [] []    Sitting Dynamic [] [x] [] [] [] []              Standing Static [] [] [x] [] [] []    Standing Dynamic [] [] [x] [] [] []      GAIT: I Mod I S SBA CGA Min Mod Max Total  NT x2 Comments:   Level of Assistance [] [] [] [] [] [x] [x] [] [] [] [x]    Distance 3  feet    DME Rolling Walker    Gait Quality Decreased aura  and Decreased step length    Weightbearing Status      Stairs      I=Independent, Mod I=Modified Independent, S=Supervision, SBA=Standby Assistance, CGA=Contact Guard Assistance,   Min=Minimal Assistance, Mod=Moderate Assistance, Max=Maximal Assistance, Total=Total Assistance, NT=Not Tested    PLAN:   FREQUENCY AND DURATION: 3 times/week for duration of hospital stay or until stated goals are met, whichever comes first.    TREATMENT:   TREATMENT:   Co-Treatment PT/OT necessary due to patient's decreased overall endurance/tolerance levels, as well as need for high level skilled assistance to complete functional transfers/mobility and functional tasks  Therapeutic Activity (25 Minutes): Therapeutic activity included Supine to Sit, Scooting, Transfer Training, Ambulation on level ground, Sitting balance , and Standing balance to improve functional Activity tolerance, Balance, Mobility, and Strength.    TREATMENT GRID:  N/A    AFTER TREATMENT PRECAUTIONS: Alarm Activated, Bed/Chair Locked, Call light within reach, Chair, Needs within reach, and RN notified    INTERDISCIPLINARY COLLABORATION:  RN/ PCT and OT/ BOSE    EDUCATION: Education Given To: Patient  Education Provided: Transfer Training;Mobility Training;Fall Prevention Strategies;Equipment  Education Method: Verbal  Barriers to Learning: None  Education Outcome: Continued education needed    TIME  IN/OUT:  Time In: 1441  Time Out: 1506  Minutes: 25    Jean Wright, PT

## 2024-12-09 NOTE — PROGRESS NOTES
smoker/drinker    High Cholesterol Brother         no comment    Breast Cancer Neg Hx      Social History     Socioeconomic History    Marital status:      Spouse name: Not on file    Number of children: Not on file    Years of education: Not on file    Highest education level: Not on file   Occupational History    Not on file   Tobacco Use    Smoking status: Former     Current packs/day: 0.00     Average packs/day: 0.3 packs/day for 27.0 years (6.8 ttl pk-yrs)     Types: Cigarettes     Start date: 1970     Quit date: 1997     Years since quittin.9    Smokeless tobacco: Never   Vaping Use    Vaping status: Never Used   Substance and Sexual Activity    Alcohol use: Yes     Alcohol/week: 3.0 standard drinks of alcohol     Types: 3 Glasses of wine per week     Comment: occasional    Drug use: Never    Sexual activity: Not Currently   Other Topics Concern    Not on file   Social History Narrative    Not on file     Social Determinants of Health     Financial Resource Strain: Low Risk  (2024)    Overall Financial Resource Strain (CARDIA)     Difficulty of Paying Living Expenses: Not very hard   Food Insecurity: No Food Insecurity (2024)    Hunger Vital Sign     Worried About Running Out of Food in the Last Year: Never true     Ran Out of Food in the Last Year: Never true   Transportation Needs: No Transportation Needs (2024)    PRAPARE - Transportation     Lack of Transportation (Medical): No     Lack of Transportation (Non-Medical): No   Physical Activity: Sufficiently Active (10/19/2023)    Exercise Vital Sign     Days of Exercise per Week: 5 days     Minutes of Exercise per Session: 40 min   Stress: Not on file   Social Connections: Feeling Socially Integrated (2023)    OASIS : Social Isolation     Frequency of experiencing loneliness or isolation: Never   Intimate Partner Violence: Unknown (3/20/2021)    Received from [employer] [employer]    Intimate Partner  reviewing the chart and time with the patient and family, more than 50% of the time documented was spent in face-to-face contact with the patient and in the care of the patient on the floor/unit where the patient is located. My findings are as follows: She has metastatic colon cancer with brain mets, appears weak, heart rate regular without murmurs, abdomen is non-tender, bowel sounds are positive, we will continue Keppra/dex and arrange for her to undergo RT.              Fabio Corral MD    [facility] Hematology/Oncology  [address] [place] [address]  Office : [phone]  Fax : [phone]

## 2024-12-09 NOTE — PROGRESS NOTES
Hospitalist Progress Note   Admit Date:  2024  7:34 PM   Name:  Haven Hui   Age:  79 y.o.  Sex:  female  :  1945   MRN:  [id number]   Room:  [address] [place] [address]    Presenting/Chief Complaint: Headache     Reason(s) for Admission: Metastatic adenocarcinoma to brain (HCC) [C79.31]  Vasogenic edema (HCC) [G93.6]     Hospital Course:   Haven Hui is a 79 y.o. 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  Patient underwent MRI and was evaluated by her oncologist Dr. Johnson with the abnormal results which showed multiple lesions in the brain with associated vasogenic edema concerning for surrounding hemorrhage.  Patient has been having headaches over the past few weeks which prompted her to have imaging done.  Patient does take Xarelto due to history of pulmonary embolism.  Patient also complained of some blurry vision as well as numbness to her right proximal upper extremity.  Patient status post IV Decadron 10 mg in the ED.  Repeat CT showed bilateral brain mets with extensive vasogenic edema with 4 mm left to right shift.  Neurosurgery was contacted by ED provider.  MRI with multiple lesions in the brain with associated vasogenic edema.  Concern for surrounding hemorrhage  - Repeat CT with bilateral brain mets with extensive vasogenic edema.  4 mm left-to-right shift. Repeat CT head is stable      Subjective & 24hr Events:   Patient was seen and examined at the bedside.  She was AAO x 2. She was eating okay.  Unable to obtain ROS. Discussed with the RN at the bedside.      Assessment & Plan:   Vasogenic edema (HCC)  Metastasis to brain   Continue Decadron 6mg every 6 hours.  Continue Keppra  No surgical intervention  Planned for XRT  Rad / Oncology consulted, appreciate recommendations  Oncology on board, appreciate  Immature Granulocytes % 0 0.0 - 5.0 %    Neutrophils Absolute 14.2 (H) 1.7 - 8.2 K/UL    Lymphocytes Absolute 0.9 0.5 - 4.6 K/UL    Monocytes Absolute 0.5 0.1 - 1.3 K/UL    Eosinophils Absolute 0.0 0.0 - 0.8 K/UL    Basophils Absolute 0.0 0.0 - 0.2 K/UL    Immature Granulocytes Absolute 0.1 0.0 - 0.5 K/UL   Basic Metabolic Panel w/ Reflex to MG    Collection Time: 12/08/24  5:37 AM   Result Value Ref Range    Sodium 140 136 - 145 mmol/L    Potassium 3.9 3.5 - 5.1 mmol/L    Chloride 104 98 - 107 mmol/L    CO2 23 20 - 29 mmol/L    Anion Gap 14 7 - 16 mmol/L    Glucose 140 (H) 70 - 99 mg/dL    BUN 23 8 - 23 MG/DL    Creatinine 0.72 0.60 - 1.10 MG/DL    Est, Glom Filt Rate 86 >60 ml/min/1.73m2    Calcium 9.3 8.8 - 10.2 MG/DL   Phosphorus    Collection Time: 12/08/24  5:37 AM   Result Value Ref Range    Phosphorus 3.1 2.5 - 4.5 MG/DL   CBC with Auto Differential    Collection Time: 12/09/24  3:36 AM   Result Value Ref Range    WBC 11.3 (H) 4.3 - 11.1 K/uL    RBC 3.99 (L) 4.05 - 5.2 M/uL    Hemoglobin 12.9 11.7 - 15.4 g/dL    Hematocrit 37.6 35.8 - 46.3 %    MCV 94.2 82 - 102 FL    MCH 32.3 26.1 - 32.9 PG    MCHC 34.3 31.4 - 35.0 g/dL    RDW 12.7 11.9 - 14.6 %    Platelets 277 150 - 450 K/uL    MPV 10.2 9.4 - 12.3 FL    nRBC 0.00 0.0 - 0.2 K/uL    Differential Type AUTOMATED      Neutrophils % 90 (H) 43 - 78 %    Lymphocytes % 6 (L) 13 - 44 %    Monocytes % 3 (L) 4.0 - 12.0 %    Eosinophils % 0 (L) 0.5 - 7.8 %    Basophils % 0 0.0 - 2.0 %    Immature Granulocytes % 1 0.0 - 5.0 %    Neutrophils Absolute 10.2 (H) 1.7 - 8.2 K/UL    Lymphocytes Absolute 0.7 0.5 - 4.6 K/UL    Monocytes Absolute 0.4 0.1 - 1.3 K/UL    Eosinophils Absolute 0.0 0.0 - 0.8 K/UL    Basophils Absolute 0.0 0.0 - 0.2 K/UL    Immature Granulocytes Absolute 0.1 0.0 - 0.5 K/UL   Basic Metabolic Panel w/ Reflex to MG    Collection Time: 12/09/24  3:36 AM   Result Value Ref Range    Sodium 139 136 - 145 mmol/L    Potassium 4.2 3.5 - 5.1 mmol/L    Chloride

## 2024-12-10 ENCOUNTER — HOSPITAL ENCOUNTER (OUTPATIENT)
Dept: RADIATION ONCOLOGY | Age: 79
Setting detail: RECURRING SERIES
Discharge: HOME | End: 2024-12-13
Payer: MEDICARE

## 2024-12-10 LAB
ANION GAP SERPL CALC-SCNC: 12 MMOL/L (ref 7–16)
BASOPHILS # BLD: 0 K/UL (ref 0–0.2)
BASOPHILS NFR BLD: 0 % (ref 0–2)
BUN SERPL-MCNC: 25 MG/DL (ref 8–23)
CALCIUM SERPL-MCNC: 8.7 MG/DL (ref 8.8–10.2)
CHLORIDE SERPL-SCNC: 108 MMOL/L (ref 98–107)
CO2 SERPL-SCNC: 20 MMOL/L (ref 20–29)
CREAT SERPL-MCNC: 0.63 MG/DL (ref 0.6–1.1)
DIFFERENTIAL METHOD BLD: ABNORMAL
EOSINOPHIL # BLD: 0 K/UL (ref 0–0.8)
EOSINOPHIL NFR BLD: 0 % (ref 0.5–7.8)
ERYTHROCYTE [DISTWIDTH] IN BLOOD BY AUTOMATED COUNT: 12.3 % (ref 11.9–14.6)
GLUCOSE SERPL-MCNC: 138 MG/DL (ref 70–99)
HCT VFR BLD AUTO: 38 % (ref 35.8–46.3)
HGB BLD-MCNC: 13.1 G/DL (ref 11.7–15.4)
IMM GRANULOCYTES # BLD AUTO: 0.1 K/UL (ref 0–0.5)
IMM GRANULOCYTES NFR BLD AUTO: 1 % (ref 0–5)
LYMPHOCYTES # BLD: 0.7 K/UL (ref 0.5–4.6)
LYMPHOCYTES NFR BLD: 7 % (ref 13–44)
MCH RBC QN AUTO: 32.8 PG (ref 26.1–32.9)
MCHC RBC AUTO-ENTMCNC: 34.5 G/DL (ref 31.4–35)
MCV RBC AUTO: 95 FL (ref 82–102)
MONOCYTES # BLD: 0.6 K/UL (ref 0.1–1.3)
MONOCYTES NFR BLD: 6 % (ref 4–12)
NEUTS SEG # BLD: 8.9 K/UL (ref 1.7–8.2)
NEUTS SEG NFR BLD: 86 % (ref 43–78)
NRBC # BLD: 0 K/UL (ref 0–0.2)
PHOSPHATE SERPL-MCNC: 3.1 MG/DL (ref 2.5–4.5)
PLATELET # BLD AUTO: 266 K/UL (ref 150–450)
PMV BLD AUTO: 10.4 FL (ref 9.4–12.3)
POTASSIUM SERPL-SCNC: 4.1 MMOL/L (ref 3.5–5.1)
RBC # BLD AUTO: 4 M/UL (ref 4.05–5.2)
SODIUM SERPL-SCNC: 139 MMOL/L (ref 136–145)
WBC # BLD AUTO: 10.2 K/UL (ref 4.3–11.1)

## 2024-12-10 PROCEDURE — 92507 TX SP LANG VOICE COMM INDIV: CPT

## 2024-12-10 PROCEDURE — 92526 ORAL FUNCTION THERAPY: CPT

## 2024-12-10 PROCEDURE — 80048 BASIC METABOLIC PNL TOTAL CA: CPT

## 2024-12-10 PROCEDURE — 6370000000 HC RX 637 (ALT 250 FOR IP): Performed by: STUDENT IN AN ORGANIZED HEALTH CARE EDUCATION/TRAINING PROGRAM

## 2024-12-10 PROCEDURE — 2580000003 HC RX 258: Performed by: STUDENT IN AN ORGANIZED HEALTH CARE EDUCATION/TRAINING PROGRAM

## 2024-12-10 PROCEDURE — 36415 COLL VENOUS BLD VENIPUNCTURE: CPT

## 2024-12-10 PROCEDURE — 6370000000 HC RX 637 (ALT 250 FOR IP): Performed by: HOSPITALIST

## 2024-12-10 PROCEDURE — 6360000002 HC RX W HCPCS: Performed by: STUDENT IN AN ORGANIZED HEALTH CARE EDUCATION/TRAINING PROGRAM

## 2024-12-10 PROCEDURE — 1170000000 HC RM PRIVATE ONCOLOGY

## 2024-12-10 PROCEDURE — 77334 RADIATION TREATMENT AID(S): CPT

## 2024-12-10 PROCEDURE — 84100 ASSAY OF PHOSPHORUS: CPT

## 2024-12-10 PROCEDURE — 6370000000 HC RX 637 (ALT 250 FOR IP): Performed by: NURSE PRACTITIONER

## 2024-12-10 PROCEDURE — 1100000000 HC RM PRIVATE

## 2024-12-10 PROCEDURE — 85025 COMPLETE CBC W/AUTO DIFF WBC: CPT

## 2024-12-10 PROCEDURE — 51702 INSERT TEMP BLADDER CATH: CPT

## 2024-12-10 RX ORDER — HYDROCODONE BITARTRATE AND ACETAMINOPHEN 5; 325 MG/1; MG/1
1 TABLET ORAL EVERY 4 HOURS PRN
Status: DISCONTINUED | OUTPATIENT
Start: 2024-12-10 | End: 2024-12-14 | Stop reason: HOSPADM

## 2024-12-10 RX ORDER — BUTALBITAL, ACETAMINOPHEN AND CAFFEINE 50; 325; 40 MG/1; MG/1; MG/1
1 TABLET ORAL EVERY 4 HOURS PRN
Status: DISCONTINUED | OUTPATIENT
Start: 2024-12-10 | End: 2024-12-14 | Stop reason: HOSPADM

## 2024-12-10 RX ADMIN — TAMSULOSIN HYDROCHLORIDE 0.4 MG: 0.4 CAPSULE ORAL at 08:08

## 2024-12-10 RX ADMIN — DEXAMETHASONE SODIUM PHOSPHATE 6 MG: 10 INJECTION INTRAMUSCULAR; INTRAVENOUS at 21:17

## 2024-12-10 RX ADMIN — DEXAMETHASONE SODIUM PHOSPHATE 6 MG: 10 INJECTION INTRAMUSCULAR; INTRAVENOUS at 17:13

## 2024-12-10 RX ADMIN — Medication 6 MG: at 21:17

## 2024-12-10 RX ADMIN — LEVETIRACETAM 500 MG: 100 INJECTION INTRAVENOUS at 23:41

## 2024-12-10 RX ADMIN — LEVETIRACETAM 500 MG: 100 INJECTION INTRAVENOUS at 11:19

## 2024-12-10 RX ADMIN — SODIUM CHLORIDE, PRESERVATIVE FREE 10 ML: 5 INJECTION INTRAVENOUS at 08:08

## 2024-12-10 RX ADMIN — BUTALBITAL, ACETAMINOPHEN, AND CAFFEINE 1 TABLET: 325; 50; 40 TABLET ORAL at 15:05

## 2024-12-10 RX ADMIN — DEXAMETHASONE SODIUM PHOSPHATE 6 MG: 10 INJECTION INTRAMUSCULAR; INTRAVENOUS at 11:19

## 2024-12-10 RX ADMIN — CHOLECALCIFEROL TAB 125 MCG (5000 UNIT) 5000 UNITS: 125 TAB at 08:08

## 2024-12-10 RX ADMIN — SODIUM CHLORIDE 40 MG: 9 INJECTION INTRAMUSCULAR; INTRAVENOUS; SUBCUTANEOUS at 08:08

## 2024-12-10 RX ADMIN — SODIUM CHLORIDE: 9 INJECTION, SOLUTION INTRAVENOUS at 21:22

## 2024-12-10 RX ADMIN — HYDROCODONE BITARTRATE AND ACETAMINOPHEN 1 TABLET: 5; 325 TABLET ORAL at 11:42

## 2024-12-10 RX ADMIN — POLYETHYLENE GLYCOL 3350 17 G: 17 POWDER, FOR SOLUTION ORAL at 11:38

## 2024-12-10 RX ADMIN — SODIUM CHLORIDE: 9 INJECTION, SOLUTION INTRAVENOUS at 08:20

## 2024-12-10 RX ADMIN — SODIUM CHLORIDE, PRESERVATIVE FREE 10 ML: 5 INJECTION INTRAVENOUS at 23:42

## 2024-12-10 RX ADMIN — Medication 250 MG: at 11:15

## 2024-12-10 RX ADMIN — DEXAMETHASONE SODIUM PHOSPHATE 6 MG: 10 INJECTION INTRAMUSCULAR; INTRAVENOUS at 05:42

## 2024-12-10 RX ADMIN — LEVOTHYROXINE SODIUM 50 MCG: 0.05 TABLET ORAL at 05:42

## 2024-12-10 ASSESSMENT — PAIN SCALES - GENERAL
PAINLEVEL_OUTOF10: 0
PAINLEVEL_OUTOF10: 7
PAINLEVEL_OUTOF10: 0
PAINLEVEL_OUTOF10: 5
PAINLEVEL_OUTOF10: 0
PAINLEVEL_OUTOF10: 7

## 2024-12-10 ASSESSMENT — PAIN - FUNCTIONAL ASSESSMENT
PAIN_FUNCTIONAL_ASSESSMENT: ACTIVITIES ARE NOT PREVENTED
PAIN_FUNCTIONAL_ASSESSMENT: ACTIVITIES ARE NOT PREVENTED

## 2024-12-10 ASSESSMENT — PAIN DESCRIPTION - PAIN TYPE
TYPE: ACUTE PAIN
TYPE: ACUTE PAIN

## 2024-12-10 ASSESSMENT — PAIN DESCRIPTION - FREQUENCY
FREQUENCY: INTERMITTENT
FREQUENCY: INTERMITTENT

## 2024-12-10 ASSESSMENT — PAIN DESCRIPTION - ONSET
ONSET: GRADUAL
ONSET: GRADUAL

## 2024-12-10 ASSESSMENT — PAIN DESCRIPTION - DESCRIPTORS
DESCRIPTORS: ACHING
DESCRIPTORS: ACHING

## 2024-12-10 ASSESSMENT — PAIN DESCRIPTION - LOCATION
LOCATION: HEAD
LOCATION: HEAD

## 2024-12-10 ASSESSMENT — PAIN DESCRIPTION - ORIENTATION
ORIENTATION: LEFT
ORIENTATION: LEFT

## 2024-12-10 NOTE — PROGRESS NOTES
Physical Therapy Note:    Attempted to see patient this PM for physical therapy treatment  session. Patient had radiation simulation this morning and very tired this afternoon. RN states pt is about to take a nap and requesting no visitors. Will follow and re-attempt as schedule permits/patient available. Thank you,    Nicolasa Price, [facility]     Rehab Caseload Tracker

## 2024-12-10 NOTE — ICUWATCH
RRT Clinical Rounding Nurse Update    Vitals:    12/09/24 1909 12/09/24 2305 12/10/24 0259 12/10/24 0809   BP: 138/72 (!) 145/75 (!) 149/62 (!) 154/69   Pulse: 51 55 50 (!) 49   Resp: 18 18 17 18   Temp: 97.5 °F (36.4 °C) 98.4 °F (36.9 °C) 97.3 °F (36.3 °C) 98.2 °F (36.8 °C)   TempSrc: Oral Oral Oral    SpO2: 95% 94% 96%    Weight:       Height:          ASSESSMENT:  Previous outreach assessment was reviewed. There have been no significant changes since previous assessment. Patient transferred out of ICU last evening to BMT. Awaiting word from Rad Onc for appointment for future radiation therapy. Patient placed DNR yesterday. HR 40-50's. BP stable. Alert oriented. Complains of headache at this time.    PLAN:  Will follow per RRT Clinical Rounding Program protocol.    King Santos RN  [facility]: [phone]  [facility]: [phone]

## 2024-12-10 NOTE — CARE COORDINATION
LOS 4D    Per Rad Onc note pt for 5 XRT starting on 12/12.    Followed up with Ayanna from [facility]. Family is electing to try radiation and will consider hospice in a few weeks.

## 2024-12-10 NOTE — PROGRESS NOTES
GOALS:  LTG: Patient will maintain adequate hydration/nutrition with optimum safety and efficiency of swallowing function with PO intake without overt signs or symptoms of aspiration for the highest appropriate diet level.  STG: UPGRADED 12/10/24  Patient will consume soft and bite-sized textures and thin liquids without overt signs or symptoms of airway compromise.  Patient will safely ingest diet trials during therapeutic feedings with SLP without overt signs or symptoms of respiratory compromise in efforts to advance diet.    LTG: Patient will improve neuro-linguistic abilities to increase safety and awareness in functional living environment.     Patient will utilize visual supports/aids to improve orientation with min cues. CONTINUE 12/10/24    SPEECH LANGUAGE PATHOLOGY: COMBINED Cognition and Dysphagia Daily Note #1    Acknowledge Order  I  Therapy Time  I   Charges     I  Rehab Caseload Tracker    NAME: Haven Hui  : 1945  MRN: [id number]    ADMISSION DATE: 2024  PRIMARY DIAGNOSIS: Vasogenic edema (HCC)    ICD-10: Treatment Diagnosis: R13.12 Dysphagia, Oropharyngeal Phase  R41.841 Cognitive-Communication Deficit    RECOMMENDATIONS   Diet:    Soft and Bite-Sized  Thin Liquids    Medication: as tolerated   Compensatory Swallowing Strategies:   Slow rate of intake  Small bites/sips  Upright as possible for all oral intake  Set up assist   Therapeutic Intervention:   Patient/family education  Dysphagia treatment  Cognitive-linguistic treatment   Patient continues to require skilled intervention:  Yes. Recommend ongoing speech therapy services during this hospitalization.     Anticipated Discharge Needs: Do not anticipate ongoing speech therapy needs upon discharge.      ASSESSMENT    Patient tolerating soft and bite sized and thin liquids without overt indications of airway compromise. Functional mastication in spite of missing dentition. Patient agreeable to upgrade to soft and bite sized  Moderately Severe Disability Unable to walk without assistance and unable to attend to own bodily needs without assistance.    [] 5 Severe disability Bedridden, incontinent, and requiring constant nursing care and attention.        PLAN    Duration/Frequency: Continue to follow patient 3x/week for duration of hospitalization and/or until goals met    Rehabilitation Potential For Stated Goals: Good    Interdisciplinary Collaboration: RN/ PCT    Medical Necessity    Patient is expected to demonstrate progress in swallow timeliness, swallow function, diet tolerance, and swallow safety to improve swallow safety, work toward diet advancement, and decrease aspiration risk.  Patient is expected to demonstrate progress in cognitive ability to decrease assistance required communication and increase communication with family/caregivers.    Education:   Patient educated on Speech therapy recommendations, Role of speech therapy, SLP plan, and Diet recommendations  Education provided to Patient and RN  Education response: Demonstrates understanding    Safety:   Call light within reach  In Bed  RN notified   Family/visitors at bedside    Therapy Time:  Time In: 1110  Time Out: 1130  Minutes: 20    Klarissa Allred M.S., CCC-SLP   12/10/2024 11:34 AM

## 2024-12-10 NOTE — ICUWATCH
RRT Clinical Rounding Nurse Update    Vitals:    12/09/24 2305 12/10/24 0259 12/10/24 0809 12/10/24 1148   BP: (!) 145/75 (!) 149/62 (!) 154/69 133/60   Pulse: 55 50 (!) 49 57   Resp: 18 17 18 18   Temp: 98.4 °F (36.9 °C) 97.3 °F (36.3 °C) 98.2 °F (36.8 °C) 98.1 °F (36.7 °C)   TempSrc: Oral Oral  Oral   SpO2: 94% 96% 95% 96%   Weight:       Height:          ASSESSMENT:  Previous outreach assessment was reviewed. There have been no significant changes since previous assessment. Patient now off the floor for her appointment with radiation oncology group.    PLAN:  Will follow per RRT Clinical Rounding Program protocol.    King Santos RN  [facility]: [phone]  [facility]: [phone]

## 2024-12-10 NOTE — PROGRESS NOTES
Inpatient consult arrived via transport on stretcher from room 537 SFD.  She is alert and oriented.  Pt was alone for consult.  I asked if someone should be called during consult and she states Radiation has been discussed with her family.  She states Hospice has spoken with her and she may request services post RT.  No previous RT.  She is c/o of pain to left side of head level 7 at present.  She states she will be medicated for pain upon return to floor.  Decadron 6mg every 6 hours and Keppra 500mg every 12 hours.  She states Xarelto is being held.  Radiation teaching was completed.  New pt folder was given and explained.  CONSENTS SIGNED FOR SRS BRAIN.  CT SIM TODAY.    Donna Garcia RN    
0.00     Average packs/day: 0.3 packs/day for 27.0 years (6.8 ttl pk-yrs)     Types: Cigarettes     Start date: 1970     Quit date: 1997     Years since quittin.9    Smokeless tobacco: Never   Vaping Use    Vaping status: Never Used   Substance and Sexual Activity    Alcohol use: Yes     Alcohol/week: 3.0 standard drinks of alcohol     Types: 3 Glasses of wine per week     Comment: occasional    Drug use: Never    Sexual activity: Not Currently   Other Topics Concern    Not on file   Social History Narrative    Not on file     Social Determinants of Health     Financial Resource Strain: Low Risk  (2024)    Overall Financial Resource Strain (CARDIA)     Difficulty of Paying Living Expenses: Not very hard   Food Insecurity: No Food Insecurity (2024)    Hunger Vital Sign     Worried About Running Out of Food in the Last Year: Never true     Ran Out of Food in the Last Year: Never true   Transportation Needs: No Transportation Needs (2024)    PRAPARE - Transportation     Lack of Transportation (Medical): No     Lack of Transportation (Non-Medical): No   Physical Activity: Sufficiently Active (10/19/2023)    Exercise Vital Sign     Days of Exercise per Week: 5 days     Minutes of Exercise per Session: 40 min   Stress: Not on file   Social Connections: Feeling Socially Integrated (2023)    OASIS : Social Isolation     Frequency of experiencing loneliness or isolation: Never   Intimate Partner Violence: Unknown (3/20/2021)    Received from [employer], [facility]    Intimate Partner Violence     Fear of Current or Ex-Partner: Not asked     Emotionally Abused: Not asked     Physically Abused: Not asked     Sexually Abused: Not asked   Housing Stability: Low Risk  (2024)    Housing Stability Vital Sign     Unable to Pay for Housing in the Last Year: No     Number of Times Moved in the Last Year: 1     Homeless in the Last Year: No       FAMILY HISTORY:   Family History

## 2024-12-10 NOTE — PROGRESS NOTES
Brief nutrition note:    Patient back from XRT but RN asked to hold assessment today. Will attempt tomorrow.    SHANNAN MADDEN, RD

## 2024-12-10 NOTE — PROGRESS NOTES
15 -- 95 %   12/09/24 1515 -- 59 17 -- 93 %   12/09/24 1500 -- 58 19 132/63 94 %   12/09/24 1430 -- 64 16 128/76 94 %   12/09/24 1415 -- 65 17 -- 92 %   12/09/24 1400 -- 68 18 (!) 121/57 93 %   12/09/24 1345 -- 64 21 (!) 129/58 92 %   12/09/24 1330 -- 74 28 -- 93 %   12/09/24 1300 -- 72 19 (!) 115/57 93 %   12/09/24 1230 -- 76 20 (!) 106/53 94 %   12/09/24 1200 -- 62 19 139/63 93 %   12/09/24 1145 -- 65 29 -- 94 %   12/09/24 1130 98.6 °F (37 °C) 60 28 124/60 92 %   12/09/24 1100 -- 58 14 139/65 92 %   12/09/24 1030 -- 57 21 132/63 90 %   12/09/24 1000 -- 66 16 123/63 93 %   12/09/24 0930 -- 52 14 137/62 92 %   12/09/24 0900 -- 50 17 (!) 141/65 91 %   12/09/24 0830 -- 57 15 137/62 94 %       Oxygen Therapy  SpO2: 96 %  Pulse Oximetry Type: Continuous  Pulse via Oximetry: 54 beats per minute  SPO2 High Alarm Limit: 100  SPO2 Low Alarm Limit POX: 89  Pulse Oximeter Device Mode: Continuous  Pulse Oximeter Device Location: Finger  O2 Device: None (Room air)  Oximetry Probe Site Changed: No  Skin Assessment: Clean, dry, & intact  Skin Protection for O2 Device: N/A  Oxygen Therapy: None (Room air)    Estimated body mass index is 21.94 kg/m² as calculated from the following:    Height as of this encounter: 1.575 m (5' 2.01\").    Weight as of this encounter: 54.4 kg (120 lb).    Intake/Output Summary (Last 24 hours) at 12/10/2024 0806  Last data filed at 12/10/2024 0330  Gross per 24 hour   Intake 1051.97 ml   Output 1826 ml   Net -774.03 ml         Physical Exam:   General:    Frail, elderly, alert and awake, lethargic, NAD, on room air  Head:  Normocephalic, atraumatic  Eyes:  Sclerae appear normal.  Pupils equally round.  ENT:  Nares appear normal.  Moist oral mucosa  Neck:  No restricted ROM.  Trachea midline   CV:   RRR.  No m/r/g.  No jugular venous distension.  Lungs:   CTAB.  No wheezing, rhonchi, or rales.  Symmetric expansion.  Abdomen:   Soft, nontender, nondistended.  Extremities: No cyanosis or clubbing.  No  Value Ref Range    WBC 10.2 4.3 - 11.1 K/uL    RBC 4.00 (L) 4.05 - 5.2 M/uL    Hemoglobin 13.1 11.7 - 15.4 g/dL    Hematocrit 38.0 35.8 - 46.3 %    MCV 95.0 82 - 102 FL    MCH 32.8 26.1 - 32.9 PG    MCHC 34.5 31.4 - 35.0 g/dL    RDW 12.3 11.9 - 14.6 %    Platelets 266 150 - 450 K/uL    MPV 10.4 9.4 - 12.3 FL    nRBC 0.00 0.0 - 0.2 K/uL    Differential Type AUTOMATED      Neutrophils % 86 (H) 43 - 78 %    Lymphocytes % 7 (L) 13 - 44 %    Monocytes % 6 4.0 - 12.0 %    Eosinophils % 0 (L) 0.5 - 7.8 %    Basophils % 0 0.0 - 2.0 %    Immature Granulocytes % 1 0.0 - 5.0 %    Neutrophils Absolute 8.9 (H) 1.7 - 8.2 K/UL    Lymphocytes Absolute 0.7 0.5 - 4.6 K/UL    Monocytes Absolute 0.6 0.1 - 1.3 K/UL    Eosinophils Absolute 0.0 0.0 - 0.8 K/UL    Basophils Absolute 0.0 0.0 - 0.2 K/UL    Immature Granulocytes Absolute 0.1 0.0 - 0.5 K/UL   Basic Metabolic Panel w/ Reflex to MG    Collection Time: 12/10/24  4:52 AM   Result Value Ref Range    Sodium 139 136 - 145 mmol/L    Potassium 4.1 3.5 - 5.1 mmol/L    Chloride 108 (H) 98 - 107 mmol/L    CO2 20 20 - 29 mmol/L    Anion Gap 12 7 - 16 mmol/L    Glucose 138 (H) 70 - 99 mg/dL    BUN 25 (H) 8 - 23 MG/DL    Creatinine 0.63 0.60 - 1.10 MG/DL    Est, Glom Filt Rate >90 >60 ml/min/1.73m2    Calcium 8.7 (L) 8.8 - 10.2 MG/DL   Phosphorus    Collection Time: 12/10/24  4:52 AM   Result Value Ref Range    Phosphorus 3.1 2.5 - 4.5 MG/DL       Recent Labs     12/09/24  1523   COVID19 Not detected       Current Meds:  Current Facility-Administered Medications   Medication Dose Route Frequency    pantoprazole (PROTONIX) 40 mg in sodium chloride (PF) 0.9 % 10 mL injection  40 mg IntraVENous Daily    0.9 % sodium chloride infusion   IntraVENous Continuous    naloxone (NARCAN) injection 0.2 mg  0.2 mg IntraVENous PRN    tamsulosin (FLOMAX) capsule 0.4 mg  0.4 mg Oral Daily    levothyroxine (SYNTHROID) tablet 50 mcg  50 mcg Oral QAM AC    dexAMETHasone (DECADRON) injection 6 mg  6 mg

## 2024-12-11 PROCEDURE — APPSS30 APP SPLIT SHARED TIME 16-30 MINUTES: Performed by: NURSE PRACTITIONER

## 2024-12-11 PROCEDURE — 6360000002 HC RX W HCPCS: Performed by: NURSE PRACTITIONER

## 2024-12-11 PROCEDURE — 97530 THERAPEUTIC ACTIVITIES: CPT

## 2024-12-11 PROCEDURE — 97535 SELF CARE MNGMENT TRAINING: CPT

## 2024-12-11 PROCEDURE — 2580000003 HC RX 258: Performed by: STUDENT IN AN ORGANIZED HEALTH CARE EDUCATION/TRAINING PROGRAM

## 2024-12-11 PROCEDURE — 92507 TX SP LANG VOICE COMM INDIV: CPT

## 2024-12-11 PROCEDURE — 6370000000 HC RX 637 (ALT 250 FOR IP): Performed by: STUDENT IN AN ORGANIZED HEALTH CARE EDUCATION/TRAINING PROGRAM

## 2024-12-11 PROCEDURE — 92526 ORAL FUNCTION THERAPY: CPT

## 2024-12-11 PROCEDURE — 51702 INSERT TEMP BLADDER CATH: CPT

## 2024-12-11 PROCEDURE — 6370000000 HC RX 637 (ALT 250 FOR IP): Performed by: HOSPITALIST

## 2024-12-11 PROCEDURE — 1170000000 HC RM PRIVATE ONCOLOGY

## 2024-12-11 PROCEDURE — 97112 NEUROMUSCULAR REEDUCATION: CPT

## 2024-12-11 PROCEDURE — 6360000002 HC RX W HCPCS: Performed by: STUDENT IN AN ORGANIZED HEALTH CARE EDUCATION/TRAINING PROGRAM

## 2024-12-11 PROCEDURE — 99233 SBSQ HOSP IP/OBS HIGH 50: CPT | Performed by: INTERNAL MEDICINE

## 2024-12-11 RX ORDER — BISACODYL 5 MG/1
5 TABLET, DELAYED RELEASE ORAL ONCE
Status: COMPLETED | OUTPATIENT
Start: 2024-12-11 | End: 2024-12-11

## 2024-12-11 RX ORDER — SENNOSIDES A AND B 8.6 MG/1
2 TABLET, FILM COATED ORAL 2 TIMES DAILY
Status: DISCONTINUED | OUTPATIENT
Start: 2024-12-11 | End: 2024-12-14 | Stop reason: HOSPADM

## 2024-12-11 RX ORDER — POLYETHYLENE GLYCOL 3350 17 G/17G
17 POWDER, FOR SOLUTION ORAL ONCE
Status: COMPLETED | OUTPATIENT
Start: 2024-12-11 | End: 2024-12-11

## 2024-12-11 RX ORDER — DEXAMETHASONE SODIUM PHOSPHATE 4 MG/ML
4 INJECTION, SOLUTION INTRA-ARTICULAR; INTRALESIONAL; INTRAMUSCULAR; INTRAVENOUS; SOFT TISSUE EVERY 6 HOURS
Status: DISCONTINUED | OUTPATIENT
Start: 2024-12-11 | End: 2024-12-13

## 2024-12-11 RX ORDER — MORPHINE SULFATE 2 MG/ML
2 INJECTION, SOLUTION INTRAMUSCULAR; INTRAVENOUS EVERY 4 HOURS PRN
Status: DISCONTINUED | OUTPATIENT
Start: 2024-12-11 | End: 2024-12-14 | Stop reason: HOSPADM

## 2024-12-11 RX ADMIN — BISACODYL 5 MG: 5 TABLET, COATED ORAL at 08:20

## 2024-12-11 RX ADMIN — SODIUM CHLORIDE: 9 INJECTION, SOLUTION INTRAVENOUS at 08:16

## 2024-12-11 RX ADMIN — HYDROCODONE BITARTRATE AND ACETAMINOPHEN 1 TABLET: 5; 325 TABLET ORAL at 12:27

## 2024-12-11 RX ADMIN — DEXAMETHASONE SODIUM PHOSPHATE 4 MG: 4 INJECTION INTRA-ARTICULAR; INTRALESIONAL; INTRAMUSCULAR; INTRAVENOUS; SOFT TISSUE at 12:28

## 2024-12-11 RX ADMIN — DEXAMETHASONE SODIUM PHOSPHATE 4 MG: 4 INJECTION INTRA-ARTICULAR; INTRALESIONAL; INTRAMUSCULAR; INTRAVENOUS; SOFT TISSUE at 18:03

## 2024-12-11 RX ADMIN — MAGNESIUM HYDROXIDE 5 ML: 400 SUSPENSION ORAL at 08:18

## 2024-12-11 RX ADMIN — Medication 250 MG: at 09:35

## 2024-12-11 RX ADMIN — LEVOTHYROXINE SODIUM 50 MCG: 0.05 TABLET ORAL at 04:29

## 2024-12-11 RX ADMIN — DEXAMETHASONE SODIUM PHOSPHATE 4 MG: 4 INJECTION INTRA-ARTICULAR; INTRALESIONAL; INTRAMUSCULAR; INTRAVENOUS; SOFT TISSUE at 22:51

## 2024-12-11 RX ADMIN — POLYETHYLENE GLYCOL 3350 17 G: 17 POWDER, FOR SOLUTION ORAL at 08:21

## 2024-12-11 RX ADMIN — SODIUM CHLORIDE, PRESERVATIVE FREE 10 ML: 5 INJECTION INTRAVENOUS at 08:21

## 2024-12-11 RX ADMIN — SODIUM CHLORIDE, PRESERVATIVE FREE 10 ML: 5 INJECTION INTRAVENOUS at 21:23

## 2024-12-11 RX ADMIN — LEVETIRACETAM 500 MG: 100 INJECTION INTRAVENOUS at 12:28

## 2024-12-11 RX ADMIN — SENNOSIDES 17.2 MG: 8.6 TABLET, FILM COATED ORAL at 08:19

## 2024-12-11 RX ADMIN — TAMSULOSIN HYDROCHLORIDE 0.4 MG: 0.4 CAPSULE ORAL at 08:19

## 2024-12-11 RX ADMIN — SODIUM CHLORIDE 40 MG: 9 INJECTION INTRAMUSCULAR; INTRAVENOUS; SUBCUTANEOUS at 08:20

## 2024-12-11 RX ADMIN — DEXAMETHASONE SODIUM PHOSPHATE 6 MG: 10 INJECTION INTRAMUSCULAR; INTRAVENOUS at 04:29

## 2024-12-11 RX ADMIN — CHOLECALCIFEROL TAB 125 MCG (5000 UNIT) 5000 UNITS: 125 TAB at 08:20

## 2024-12-11 ASSESSMENT — PAIN DESCRIPTION - FREQUENCY: FREQUENCY: INTERMITTENT

## 2024-12-11 ASSESSMENT — PAIN - FUNCTIONAL ASSESSMENT: PAIN_FUNCTIONAL_ASSESSMENT: ACTIVITIES ARE NOT PREVENTED

## 2024-12-11 ASSESSMENT — PAIN DESCRIPTION - DESCRIPTORS: DESCRIPTORS: PRESSURE

## 2024-12-11 ASSESSMENT — PAIN SCALES - GENERAL
PAINLEVEL_OUTOF10: 0
PAINLEVEL_OUTOF10: 6
PAINLEVEL_OUTOF10: 0

## 2024-12-11 ASSESSMENT — PAIN DESCRIPTION - ORIENTATION: ORIENTATION: POSTERIOR

## 2024-12-11 ASSESSMENT — PAIN DESCRIPTION - ONSET: ONSET: GRADUAL

## 2024-12-11 ASSESSMENT — PAIN DESCRIPTION - PAIN TYPE: TYPE: ACUTE PAIN

## 2024-12-11 ASSESSMENT — PAIN DESCRIPTION - LOCATION: LOCATION: HEAD

## 2024-12-11 NOTE — PROGRESS NOTES
ACUTE PHYSICAL THERAPY GOALS:   (Developed with and agreed upon by patient and/or caregiver.)  STG:  (1.)Ms. Hui will move from supine to sit and sit to supine , scoot up and down, and roll side to side with MINIMAL ASSIST within 4 treatment day(s).    (2.)Ms. Hui will transfer from bed to chair and chair to bed with MINIMAL ASSIST using the least restrictive device within 4 treatment day(s).    (3.)Ms. Hui will ambulate with MODERATE ASSIST for 25 feet with the least restrictive device within 4 treatment day(s).      LTG:  (1.)Ms. Hui will move from supine to sit and sit to supine , scoot up and down, and roll side to side in bed with CONTACT GUARD ASSIST within 7 treatment day(s).    (2.)Ms. Hui will transfer from bed to chair and chair to bed with CONTACT GUARD ASSIST using the least restrictive device within 7 treatment day(s).    (3.)Ms. Hui will ambulate with MINIMAL ASSIST for 50 feet with the least restrictive device within 7 treatment day(s).    PHYSICAL THERAPY: Daily Note PM   (Link to Caseload Tracking: PT Visit Days : 4  Time In/Out PT Charge Capture  Rehab Caseload Tracker  Orders    Haven Hui is a 79 y.o. female   PRIMARY DIAGNOSIS: Vasogenic edema (HCC)  Metastatic adenocarcinoma to brain (HCC) [C79.31]  Vasogenic edema (HCC) [G93.6]       Inpatient: Payor: [employer] MEDICARE / Plan: [employer] GOLD PLUS HMO / Product Type: *No Product type* /     ASSESSMENT:     REHAB RECOMMENDATIONS:   Recommendation to date pending progress:  Setting:  Short-term Rehab    Equipment:    To Be Determined     ASSESSMENT:  Ms. Hui is supine upon arrival and agreeable to mobility. Overall reports some fatigue but feeling much better. Today she performs bed mobility, transfers, standing tasks, and ambulation overall with [place]. She ambulated in hallway for about 80 ft with 2WW and demonstrates decreased step length and clearance, but overall is steady throughout. She does need

## 2024-12-11 NOTE — PLAN OF CARE
Problem: Discharge Planning  Goal: Discharge to home or other facility with appropriate resources  Outcome: Progressing     Problem: Pain  Goal: Verbalizes/displays adequate comfort level or baseline comfort level  Outcome: Progressing     Problem: Safety - Adult  Goal: Free from fall injury  Outcome: Progressing     Problem: Skin/Tissue Integrity  Goal: Absence of new skin breakdown  Description: 1.  Monitor for areas of redness and/or skin breakdown  2.  Assess vascular access sites hourly  3.  Every 4-6 hours minimum:  Change oxygen saturation probe site  4.  Every 4-6 hours:  If on nasal continuous positive airway pressure, respiratory therapy assess nares and determine need for appliance change or resting period.  Outcome: Progressing     Problem: Confusion  Goal: Confusion, delirium, dementia, or psychosis is improved or at baseline  Description: INTERVENTIONS:  1. Assess for possible contributors to thought disturbance, including medications, impaired vision or hearing, underlying metabolic abnormalities, dehydration, psychiatric diagnoses, and notify attending LIP  2. [place] high risk fall precautions, as indicated  3. Provide frequent short contacts to provide reality reorientation, refocusing and direction  4. Decrease environmental stimuli, including noise as appropriate  5. Monitor and intervene to maintain adequate nutrition, hydration, elimination, sleep and activity  6. If unable to ensure safety without constant attention obtain sitter and review sitter guidelines with assigned personnel  7. Initiate Psychosocial CNS and Spiritual Care consult, as indicated  Outcome: Progressing

## 2024-12-11 NOTE — PROGRESS NOTES
Hospitalist Progress Note   Admit Date:  2024  7:34 PM   Name:  Haven Hui   Age:  79 y.o.  Sex:  female  :  1945   MRN:  [id number]   Room:  [facility]/    Presenting/Chief Complaint: Headache     Reason(s) for Admission: Metastatic adenocarcinoma to brain (HCC) [C79.31]  Vasogenic edema (HCC) [G93.6]     Hospital Course:   Haven Hui is a 79 y.o. 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  Patient underwent MRI and was evaluated by her oncologist Dr. Johnson with the abnormal results which showed multiple lesions in the brain with associated vasogenic edema concerning for surrounding hemorrhage.  Patient has been having headaches over the past few weeks which prompted her to have imaging done.  Patient does take Xarelto due to history of pulmonary embolism.  Patient also complained of some blurry vision as well as numbness to her right proximal upper extremity.  Patient status post IV Decadron 10 mg in the ED.  Repeat CT showed bilateral brain mets with extensive vasogenic edema with 4 mm left to right shift.  Neurosurgery was contacted by ED provider.  MRI with multiple lesions in the brain with associated vasogenic edema.  Concern for surrounding hemorrhage  - Repeat CT with bilateral brain mets with extensive vasogenic edema.  4 mm left-to-right shift. Repeat CT head is stable      Subjective & 24hr Events:   Patient seen and examined at bedside.  She is alert and awake, AOx3, on room air.  Patient denies any acute complaints.  She reports having a good bowel movement this morning and denies having any further constipation.  Patient was evaluated by radiation oncologist, Dr. Villanueva yesterday with plan to start SRS on .  No other overnight events reported by the patient or the nursing staff.

## 2024-12-11 NOTE — PROGRESS NOTES
[facility] Hematology & Oncology        Inpatient Hematology / Oncology Progress Note    24 Hour Events:  Afebrile, VSS  Plan to start SRS tomorrow x 5fx (EOT 12/18)  States she wants to see how she does after radiation before making a decision re: Hospice  Family at bedside    ROS:  Constitutional: Positive for fatigue, weakness; negative for fever, chills.  CV: Negative for chest pain, palpitations, edema.  Respiratory: Negative for dyspnea, cough, wheezing.  GI: Negative for nausea, abdominal pain, diarrhea.    10 point review of systems is otherwise negative with the exception of the elements mentioned above in the HPI.          No Known Allergies  Past Medical History:   Diagnosis Date    Anemia     Rx iron    Cecal cancer (HCC)     History of pulmonary embolism     on Xarelto    Hypothyroidism     managed with medication    Malignant neoplasm of ascending colon (HCC)     Primary colon cancer with metastasis to 7 or more regional lymph nodes (N2b) (HCC)      Past Surgical History:   Procedure Laterality Date    APPENDECTOMY  11/15/23    CATARACT REMOVAL Bilateral 2021    COLONOSCOPY N/A 5/20/2024    COLORECTAL CANCER SCREENING, NOT HIGH RISK performed by Saul Kc MD at [facility] ENDOSCOPY    HERNIA REPAIR Right 07/20/2023    LAPAROSCOPIC RIGHT INGUINAL HERNIA REPAIR WITH MESH performed by Romel Ricks MD at [facility] MAIN OR    LAPAROSCOPIC APPENDECTOMY N/A 11/15/2023    ATTEMPTED APPENDECTOMY LAPAROSCOPIC CONVERTED TO OPEN; ILEOCYSTECTOMY WITH APPENDECTOMY, ILEOCOLOSTOMY, DRAINAGE OF PERITONITIS performed by Sabas Hernandez MD at [facility] MAIN OR    MOUTH SURGERY      SMALL INTESTINE SURGERY  11/15/23    TUMOR REMOVAL      US LYMPH NODE BIOPSY  11/4/2024    US LYMPH NODE BIOPSY 11/4/2024 Sabas Bliss MD [facility] RADIOLOGY US     Family History   Problem Relation Age of Onset    Alcohol Abuse Mother         no comment    Stroke Mother         alcoholic/smoker    Heart Attack Father         smoker     Alcohol Abuse Brother         no comment    Cancer Brother         smoker/drinker    High Cholesterol Brother         no comment    Breast Cancer Neg Hx      Social History     Socioeconomic History    Marital status:      Spouse name: Not on file    Number of children: Not on file    Years of education: Not on file    Highest education level: Not on file   Occupational History    Not on file   Tobacco Use    Smoking status: Former     Current packs/day: 0.00     Average packs/day: 0.3 packs/day for 27.0 years (6.8 ttl pk-yrs)     Types: Cigarettes     Start date: 1970     Quit date: 1997     Years since quittin.9    Smokeless tobacco: Never   Vaping Use    Vaping status: Never Used   Substance and Sexual Activity    Alcohol use: Yes     Alcohol/week: 3.0 standard drinks of alcohol     Types: 3 Glasses of wine per week     Comment: occasional    Drug use: Never    Sexual activity: Not Currently   Other Topics Concern    Not on file   Social History Narrative    Not on file     Social Determinants of Health     Financial Resource Strain: Low Risk  (2024)    Overall Financial Resource Strain (CARDIA)     Difficulty of Paying Living Expenses: Not very hard   Food Insecurity: No Food Insecurity (2024)    Hunger Vital Sign     Worried About Running Out of Food in the Last Year: Never true     Ran Out of Food in the Last Year: Never true   Transportation Needs: No Transportation Needs (2024)    PRAPARE - Transportation     Lack of Transportation (Medical): No     Lack of Transportation (Non-Medical): No   Physical Activity: Sufficiently Active (10/19/2023)    Exercise Vital Sign     Days of Exercise per Week: 5 days     Minutes of Exercise per Session: 40 min   Stress: Not on file   Social Connections: Feeling Socially Integrated (2023)    OASIS : Social Isolation     Frequency of experiencing loneliness or isolation: Never   Intimate Partner Violence: Unknown (3/20/2021)

## 2024-12-11 NOTE — PROGRESS NOTES
Assistance, Min=Minimal Assistance, Mod=Moderate Assistance, Max=Maximal Assistance, Total=Total Assistance, NT=Not Tested    ACTIVITIES OF DAILY LIVING: I Mod I S SBA CGA Min Mod Max Total NT Comments   BASIC ADLs:              Upper Body   Bathing [] [] [] [] [] [] [] [] [] [x]     Lower Body Bathing [] [] [] [] [] [] [] [] [] [x]     Toileting [] [] [] [] [] [] [] [] [] [x]    Upper Body Dressing [] [] [] [] [] [] [] [] [] [x]    Lower Body Dressing [] [] [] [] [] [x] [] [] [] [] Socks    Feeding [] [] [] [] [] [] [] [] [] [x]    Grooming [] [] [] [] [] [x] [] [] [] [] Brushed teeth and washed face standing at the sink    Personal Device Care [] [] [] [] [] [] [] [] [] []    Functional Mobility [] [] [] [] [] [x] [] [] [] [] RW   I=Independent, Mod I=Modified Independent, S=Supervision/Setup, SBA=Standby Assistance, CGA=Contact Guard Assistance, Min=Minimal Assistance, Mod=Moderate Assistance, Max=Maximal Assistance, Total=Total Assistance, NT=Not Tested    BALANCE: Good Fair+ Fair Fair- Poor NT Comments   Sitting Static [x] [] [] [] [] []    Sitting Dynamic [] [x] [] [] [] []              Standing Static [] [x] [] [] [] []    Standing Dynamic [] [] [x] [] [] []        PLAN:     FREQUENCY/DURATION   OT Plan of Care: 3 times/week for duration of hospital stay or until stated goals are met, whichever comes first.    TREATMENT:     TREATMENT:   Co-Treatment PT/OT necessary due to patient's decreased overall endurance/tolerance levels, as well as need for high level skilled assistance to complete functional transfers/mobility and functional tasks  Neuromuscular Re-education (16 Minutes): Patient participated in neuromuscular re-education including functional reaching, weight shifting, postural training, standing tolerance activity , and sitting balance activity   with minimal verbal cues to improve sitting balance, standing balance, posture, coordination, static balance, and dynamic balance in order to prepare for

## 2024-12-11 NOTE — PROGRESS NOTES
GOALS:  LTG: Patient will maintain adequate hydration/nutrition with optimum safety and efficiency of swallowing function with PO intake without overt signs or symptoms of aspiration for the highest appropriate diet level.  STG: ONGOING 24  Patient will consume soft and bite-sized textures and thin liquids without overt signs or symptoms of airway compromise.  Patient will safely ingest diet trials during therapeutic feedings with SLP without overt signs or symptoms of respiratory compromise in efforts to advance diet.    LTG: Patient will improve neuro-linguistic abilities to increase safety and awareness in functional living environment.     Patient will utilize visual supports/aids to improve orientation with min cues. MET 24    SPEECH LANGUAGE PATHOLOGY: COMBINED Cognition and Dysphagia Daily Note #2    Acknowledge Order  I  Therapy Time  I   Charges     I  Rehab Caseload Tracker    NAME: Haven Hui  : 1945  MRN: [id number]    ADMISSION DATE: 2024  PRIMARY DIAGNOSIS: Vasogenic edema (HCC)    ICD-10: Treatment Diagnosis: R13.12 Dysphagia, Oropharyngeal Phase  R41.841 Cognitive-Communication Deficit    RECOMMENDATIONS   Diet:    Soft and Bite-Sized  Thin Liquids    Medication: as tolerated   Compensatory Swallowing Strategies:   Slow rate of intake  Small bites/sips  Upright as possible for all oral intake  Set up assist   Therapeutic Intervention:   Patient/family education  Dysphagia treatment  Cognitive-linguistic treatment   Patient continues to require skilled intervention:  Yes. Recommend ongoing speech therapy services during this hospitalization.     Anticipated Discharge Needs: Do not anticipate ongoing speech therapy needs upon discharge.      ASSESSMENT    Patient is tolerating soft and bite sized diet level and thin liquids with no overt signs or symptoms of airway compromise. Adequate mastication prior to swallow and no oral residue post swallow, with limited upper

## 2024-12-11 NOTE — PROGRESS NOTES
Nutrition Assessment  Assessment Type: Reassess  Reason for visit:  Best Practice Alert: Malnutrition Screening Tool   Malnutrition Screening Tool Score: 3    Nutrition Intervention:   Food and/or Nutrient Delivery:   Meals and Snacks:  Diet: Downgrade per SLP eval  Medical Food Supplements:   Medical food supplement therapy:  Initiate Ensure Enlive (high calorie high protein) 350 calories, 20 grams protein per 8 ounce serving  - chocolate     Malnutrition Assessment:  Malnutrition Status: At risk for malnutrition (compromised appetite, declining oral intake, reported wt loss)  Nutrition Focused Physical Exam: Unremarkable   Nutrition Assessment:  Food/Nutrition Related History:   Pt reports feeling poorly, + nausea, fatigue and decreased intake pta.  She is unable to quantify recent oral intake.  Friend at bedside, reports she recently went to [place] for a visit with her family.  Upon returning it appears her intake has declined.  She reported to friend at home that she had eaten some soup.  Soup container in sink but unknown quantity of any recent intake pta.           Weight History:   Pt UBW report consistent with records below.  She reports current wt of 112# at RD encounter.    Wt hx per Oncology records:   114# 10/24, 114# 6/24, 112# 3/24, 115# 12/23.    Admission wt stated 120#.    Nutrition Background:       PMH remarkable for anemia, hx of PE on Xarelto, hypothyroidism, malignant neoplasm of ascending colon, metastatic adenocarcinoma to the brain.    Presented after abnormal MRI.   Admitted with vasogenic edema, metastasis to brain, leukocytosis, dysphagia, acute urinary retention.    S/p brain XRT simulation 12/10.  Nutrition Monitoring/Evaluation:  SLP eval 12/7: Minced and moist.    +decadron.    12/10 diet upgrade to soft and bite sized    Patient reclined in bed. Alert and oriented. Reports a few bites of potatoes and eggs this morning. She is currently chilling her Ensure that she will drink later.  She drank about half of one yesterday. She states just not that hungry. Denies any needs.      Current Nutrition Therapies:  ADULT ORAL NUTRITION SUPPLEMENT; Breakfast, Lunch, Dinner; Standard High Calorie/High Protein Oral Supplement  ADULT DIET; Dysphagia - Soft and Bite Sized    Current Intake:   Average Meal Intake: 26-50% Average Supplements Intake: 26-50%      Anthropometric Measures:  Height: 157.5 cm (5' 2.01\")  Current Body Wt: 57.2 kg (126 lb 1.7 oz) (12/10), Weight source: Bed scale  BMI: 23.1, Normal Weight (BMI 22.0 to 24.9) age over 65  Admission Body Weight: 54.4 kg (119 lb 14.9 oz) (stated)  Ideal Body Weight (Kg) (Calculated): 50 kg (110 lbs), 109 %  BMI Category Normal Weight (BMI 22.0 to 24.9) age over 65  Comparative Standards:  Energy (kcal/day): 5690-8928 (25-30 kcal/kg) (Kcal/kg Weight used: 50.9 kg (pt stated wt at RD encounter) Other  Protein (g/day): 61-66 (1.2-1.3 g/kg) Weight Used: (Other) 50.9 kg  Fluid (ml/day):   (1 ml/kcal)    Nutrition Diagnosis:   Inadequate oral intake related to decreased appetite (nausea, fatigue) as evidenced by patient reported barriers, intake 26-50%    Nutrition Goal(s):   Previous Goal Met: Progressing toward Goal(s)  Active Goal:  (Meet >50% estimated needs by RD f/u)  Type of Goal: Continue current goal    Discharge Planning:    Too soon to determine    SHANNAN MADDEN RD

## 2024-12-12 ENCOUNTER — APPOINTMENT (OUTPATIENT)
Dept: RADIATION ONCOLOGY | Age: 79
End: 2024-12-12
Payer: MEDICARE

## 2024-12-12 PROCEDURE — 51702 INSERT TEMP BLADDER CATH: CPT

## 2024-12-12 PROCEDURE — 97530 THERAPEUTIC ACTIVITIES: CPT

## 2024-12-12 PROCEDURE — 92526 ORAL FUNCTION THERAPY: CPT

## 2024-12-12 PROCEDURE — 6370000000 HC RX 637 (ALT 250 FOR IP): Performed by: STUDENT IN AN ORGANIZED HEALTH CARE EDUCATION/TRAINING PROGRAM

## 2024-12-12 PROCEDURE — 6360000002 HC RX W HCPCS: Performed by: STUDENT IN AN ORGANIZED HEALTH CARE EDUCATION/TRAINING PROGRAM

## 2024-12-12 PROCEDURE — 97164 PT RE-EVAL EST PLAN CARE: CPT

## 2024-12-12 PROCEDURE — 1170000000 HC RM PRIVATE ONCOLOGY

## 2024-12-12 PROCEDURE — 6360000002 HC RX W HCPCS: Performed by: NURSE PRACTITIONER

## 2024-12-12 PROCEDURE — 2580000003 HC RX 258: Performed by: STUDENT IN AN ORGANIZED HEALTH CARE EDUCATION/TRAINING PROGRAM

## 2024-12-12 PROCEDURE — 99232 SBSQ HOSP IP/OBS MODERATE 35: CPT | Performed by: NURSE PRACTITIONER

## 2024-12-12 PROCEDURE — 6370000000 HC RX 637 (ALT 250 FOR IP): Performed by: NURSE PRACTITIONER

## 2024-12-12 PROCEDURE — 6370000000 HC RX 637 (ALT 250 FOR IP): Performed by: HOSPITALIST

## 2024-12-12 RX ORDER — TEMAZEPAM 15 MG/1
15 CAPSULE ORAL NIGHTLY PRN
Status: DISCONTINUED | OUTPATIENT
Start: 2024-12-12 | End: 2024-12-14 | Stop reason: HOSPADM

## 2024-12-12 RX ADMIN — DEXAMETHASONE SODIUM PHOSPHATE 4 MG: 4 INJECTION INTRA-ARTICULAR; INTRALESIONAL; INTRAMUSCULAR; INTRAVENOUS; SOFT TISSUE at 22:34

## 2024-12-12 RX ADMIN — LEVOTHYROXINE SODIUM 50 MCG: 0.05 TABLET ORAL at 08:05

## 2024-12-12 RX ADMIN — DEXAMETHASONE SODIUM PHOSPHATE 4 MG: 4 INJECTION INTRA-ARTICULAR; INTRALESIONAL; INTRAMUSCULAR; INTRAVENOUS; SOFT TISSUE at 04:53

## 2024-12-12 RX ADMIN — Medication 250 MG: at 09:22

## 2024-12-12 RX ADMIN — DEXAMETHASONE SODIUM PHOSPHATE 4 MG: 4 INJECTION INTRA-ARTICULAR; INTRALESIONAL; INTRAMUSCULAR; INTRAVENOUS; SOFT TISSUE at 17:17

## 2024-12-12 RX ADMIN — POLYETHYLENE GLYCOL 3350 17 G: 17 POWDER, FOR SOLUTION ORAL at 15:51

## 2024-12-12 RX ADMIN — BUTALBITAL, ACETAMINOPHEN, AND CAFFEINE 1 TABLET: 325; 50; 40 TABLET ORAL at 08:18

## 2024-12-12 RX ADMIN — SENNOSIDES 17.2 MG: 8.6 TABLET, FILM COATED ORAL at 08:08

## 2024-12-12 RX ADMIN — TAMSULOSIN HYDROCHLORIDE 0.4 MG: 0.4 CAPSULE ORAL at 08:06

## 2024-12-12 RX ADMIN — LEVETIRACETAM 500 MG: 100 INJECTION INTRAVENOUS at 01:34

## 2024-12-12 RX ADMIN — CHOLECALCIFEROL TAB 125 MCG (5000 UNIT) 5000 UNITS: 125 TAB at 08:05

## 2024-12-12 RX ADMIN — SODIUM CHLORIDE 40 MG: 9 INJECTION INTRAMUSCULAR; INTRAVENOUS; SUBCUTANEOUS at 08:05

## 2024-12-12 RX ADMIN — DEXAMETHASONE SODIUM PHOSPHATE 4 MG: 4 INJECTION INTRA-ARTICULAR; INTRALESIONAL; INTRAMUSCULAR; INTRAVENOUS; SOFT TISSUE at 12:10

## 2024-12-12 RX ADMIN — SODIUM CHLORIDE, PRESERVATIVE FREE 10 ML: 5 INJECTION INTRAVENOUS at 08:06

## 2024-12-12 RX ADMIN — BUTALBITAL, ACETAMINOPHEN, AND CAFFEINE 1 TABLET: 325; 50; 40 TABLET ORAL at 15:57

## 2024-12-12 RX ADMIN — SODIUM CHLORIDE, PRESERVATIVE FREE 10 ML: 5 INJECTION INTRAVENOUS at 22:34

## 2024-12-12 RX ADMIN — LEVETIRACETAM 500 MG: 100 INJECTION INTRAVENOUS at 23:59

## 2024-12-12 RX ADMIN — LEVETIRACETAM 500 MG: 100 INJECTION INTRAVENOUS at 12:10

## 2024-12-12 RX ADMIN — TEMAZEPAM 15 MG: 15 CAPSULE ORAL at 22:34

## 2024-12-12 ASSESSMENT — PAIN DESCRIPTION - PAIN TYPE
TYPE: ACUTE PAIN
TYPE: ACUTE PAIN

## 2024-12-12 ASSESSMENT — PAIN DESCRIPTION - ORIENTATION
ORIENTATION: MID
ORIENTATION: MID

## 2024-12-12 ASSESSMENT — PAIN SCALES - GENERAL
PAINLEVEL_OUTOF10: 7
PAINLEVEL_OUTOF10: 3
PAINLEVEL_OUTOF10: 0
PAINLEVEL_OUTOF10: 7

## 2024-12-12 ASSESSMENT — PAIN DESCRIPTION - ONSET
ONSET: GRADUAL
ONSET: GRADUAL

## 2024-12-12 ASSESSMENT — PAIN DESCRIPTION - DESCRIPTORS
DESCRIPTORS: ACHING
DESCRIPTORS: ACHING

## 2024-12-12 ASSESSMENT — PAIN DESCRIPTION - LOCATION
LOCATION: HEAD
LOCATION: HEAD

## 2024-12-12 ASSESSMENT — PAIN DESCRIPTION - FREQUENCY
FREQUENCY: INTERMITTENT
FREQUENCY: INTERMITTENT

## 2024-12-12 NOTE — PROGRESS NOTES
MD)       Dx: Metastatic adenocarcinoma (HCC); Visi...    0 Result Notes  Details    Reading Physician Reading Date Result Priority   Ruben Webber MD  [phone] 12/5/2024      Narrative & Impression  MRI of the brain     INDICATION: Secondary malignant neoplasm of unspecified site; Abnormal results  of function studies of other organs and systems; Unspecified visual disturbance     COMPARISON:  None     Standard MRI sequences were obtained through the brain in multiple planes.  Images were obtained before and after intravenous infusion of 10 mL of ProHance.     FINDINGS:  No extra-axial fluid collection is noted. There is left to right midline shift.  There are multiple bilateral supratentorial metastases with significant  surrounding vasogenic edema including in the left frontal lobe where there is  sulcal effacement with a large 2.8 x 3.1 x 2.7 cm enhancing lesion, right  posterior temporal occipital lesion with extensive surrounding vasogenic edema  measuring 2.3 x 3.1 x 2.8 cm in size. Additional 1 cm enhancing lesion in the  frontoparietal high convexity measuring 1 x 1.2 cm in size. Furthermore an  additional high convexity lesion measuring 1.5 x 1 cm in size in the left  frontoparietal high convexity. Extensive vasogenic edema. Is also appreciated.     5 mm subtle enhancing lesion at the left frontal parietal region abutting a deep  sulcus is appreciated.     No intracranial hemorrhage or midline shift     Visualized paranasal sinuses are aerated.     IMPRESSION:  1. Multiple bilateral supratentorial metastases with extensive vasogenic edema  and sulcal effacement in the region of the edema especially in the left frontal  parietal region. Furthermore there is approximately 4 to 5 mm left to right  midline shift.     Electronically signed by Ruben Webber           Exam Ended: 12/05/24 17:43 EST Last Resulted: 12/05/24 18:33 EST          ASSESSMENT:  Ms. Hui is a 79 year old female that a history  of adenocarcinoma that sees Dr. Johnson outpatient. She also has a history of a PE after surgery without cor pulmonale and on Xarelto. 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 She was not interested in chemotherapy and wished for only surveillance at that time. In October 2024, she saw Dr. Johnson outpatient and PET scan was discussed that showed what appeared as active sites of disease in the supraclavicular lymph node, cervical spine, liver, and adrenal gland. L retroclavicular lymph node was biopsied on 11/4 and was c/w metastatic adenocarcinoma. MRI C-spine showed a lesion in C7 with involvement of the R pedicle and lateral mass and with cortical breakthrough along the R anterior lateral margin with a small paravertebral soft tissue extension. She was asymptomatic so XRT was held. MRI Brain was ordered to complete staging. MRI showed multiple brain masses with vasogenic edema with 5mm midline shift. Patient was sent to the ED. She has started Dex and Keppra. Neurosurgery has been consulted and patient was admitted to ICU.       RECOMMENDATIONS:  Cecal Adenocarcinoma  -PET scan in October showed focal uptake in hepatic segment 6 VS uptake in R adrenal gland along with FDG avid osseous, RP, and pelvic soy mets.  -Patient wished to defer chemo and has been on surveillance.   12/12 Pt states she may be interested in pursing treatment.  Follow up appt scheduled with Dr. Johnson on 1/3.    Brain Masses  -Continue Dex and Keppra.  -Appreciate Neurosurgery recs.  -Rad/onc was consulted yesterday.  12/8 Neurosurgery following and recs supportive care.   12/9 Pt agreeable to XRT; Rad Onc consulted.  Con't Dex/Keppra.  No surgical plans.  Hospice consulted for presentation.  PC consulted to assist with GOC conversations.  12/11 Plan to start SRS tomorrow x 5 fx (EOT 12/18).

## 2024-12-12 NOTE — PROGRESS NOTES
absolutely doesn't need to go to rehab.  She may not need home health.  Today she ambulated with a rolling walker and SBA for 350 ft without stopping.  She had good pace and no loss of balance.  Bed mobility is with supervision and so are transfers.  New goals had to be set.  At this point goal is to get her off the walker.  Her daughter will be staying with her until the end of January.             SUBJECTIVE:   Ms. Hui states, \"that felt good\"    Social/Functional Lives With: Alone (Simultaneous filing. User may not have seen previous data.)  Type of Home: House (Simultaneous filing. User may not have seen previous data.)  Home Layout: One level (Simultaneous filing. User may not have seen previous data.)  Home Access: Level entry (Simultaneous filing. User may not have seen previous data.)  Entrance Stairs - Number of Steps: 6  Bathroom Shower/Tub: Tub/Shower unit  Bathroom Toilet: Standard  Bathroom Accessibility: Accessible  Home Equipment: None  Receives Help From: Family  Prior Level of Assist for ADLs: Independent  Prior Level of Assist for Homemaking: Independent  Homemaking Responsibilities: Yes  Prior Level of Assist for Transfers: Independent  Active : No  Mode of Transportation: Car  Occupation: Retired  OBJECTIVE:     PAIN: VITALS / O2: PRECAUTION / LINES / DRAINS:   Pre Treatment:          Post Treatment: none stated Vitals    Stable during session    Oxygen    Continuous Pulse Oximetry and Telemetry     RESTRICTIONS/PRECAUTIONS:  Restrictions/Precautions  Restrictions/Precautions: Fall Risk  Restrictions/Precautions: Fall Risk     MOBILITY: I Mod I S SBA CGA Min Mod Max Total  NT x2 Comments:   Bed Mobility    Rolling [] [] [x] [] [] [] [] [] [] [] []    Supine to Sit [] [] [x] [] [] [] [] [] [] [] []    Scooting [] [] [x] [] [] [] [] [] [] [] []    Sit to Supine [] [] [] [] [] [] [] [] [] [] []    Transfers    Sit to Stand [] [] [] [x] [] [] [] [] [] [] []    Bed to Chair [] [] [] [] []

## 2024-12-12 NOTE — PROGRESS NOTES
ACUTE PHYSICAL THERAPY GOALS:   (Developed with and agreed upon by patient and/or caregiver.)  SPHYSICAL THERAPY: Daily Note PM   (Link to Caseload Tracking: PT Visit Days : 5    NEW GOALS:   Ms. Hui will perform all transfers independently in 7 days.    Ms. Hui will perform gait >1000 ft independently without assistive device in 7 days.     Time In/Out PT Charge Capture  Rehab Caseload Tracker  Orders    Haven Hui is a 79 y.o. female   PRIMARY DIAGNOSIS: Vasogenic edema (HCC)  Metastatic adenocarcinoma to brain (HCC) [C79.31]  Vasogenic edema (HCC) [G93.6]       Inpatient: Payor: [employer] MEDICARE / Plan: [employer] GOLD PLUS HMO / Product Type: *No Product type* /     ASSESSMENT:     REHAB RECOMMENDATIONS:   Recommendation to date pending progress:  Setting:  Short-term Rehab    Equipment:    To Be Determined     ASSESSMENT:  Ms. Hui is doing so amazingly that she has met all her goals.  She absolutely doesn't need to go to rehab.  She may not need home health.  Today she ambulated with a rolling walker and SBA for 350 ft without stopping.  She had good pace and no loss of balance.  Bed mobility is with supervision and so are transfers.  New goals had to be set.  At this point goal is to get her off the walker.  Her daughter will be staying with her until the end of January.       SUBJECTIVE:   Ms. Hui states, \"[facility]\"    Social/Functional Lives With: Alone (Simultaneous filing. User may not have seen previous data.)  Type of Home: House (Simultaneous filing. User may not have seen previous data.)  Home Layout: One level (Simultaneous filing. User may not have seen previous data.)  Home Access: Level entry (Simultaneous filing. User may not have seen previous data.)  Entrance Stairs - Number of Steps: 6  Bathroom Shower/Tub: Tub/Shower unit  Bathroom Toilet: Standard  Bathroom Accessibility: Accessible  Home Equipment: None  Receives Help From: Family  Prior Level of Assist for ADLs:  Independent  Prior Level of Assist for Homemaking: Independent  Homemaking Responsibilities: Yes  Prior Level of Assist for Transfers: Independent  Active : No  Mode of Transportation: Car  Occupation: Retired  OBJECTIVE:     PAIN: VITALS / O2: PRECAUTION / LINES / DRAINS:   Pre Treatment:          Post Treatment: none stated Vitals    Stable during session    Oxygen    Continuous Pulse Oximetry and Telemetry     RESTRICTIONS/PRECAUTIONS:  Restrictions/Precautions  Restrictions/Precautions: Fall Risk  Restrictions/Precautions: Fall Risk     MOBILITY: I Mod I S SBA CGA Min Mod Max Total  NT x2 Comments:   Bed Mobility    Rolling [] [] [x] [] [] [] [] [] [] [] []    Supine to Sit [] [] [x] [] [] [] [] [] [] [] []    Scooting [] [] [x] [] [] [] [] [] [] [] []    Sit to Supine [] [] [] [] [] [] [] [] [] [] []    Transfers    Sit to Stand [] [] [] [x] [] [] [] [] [] [] []    Bed to Chair [] [] [] [] [] [] [] [] [] [] []    Stand to Sit [] [] [] [x] [] [] [] [] [] [] []     [] [] [] [] [] [] [] [] [] [] []    I=Independent, Mod I=Modified Independent, S=Supervision, SBA=Standby Assistance, CGA=Contact Guard Assistance,   Min=Minimal Assistance, Mod=Moderate Assistance, Max=Maximal Assistance, Total=Total Assistance, NT=Not Tested    BALANCE: Good Fair+ Fair Fair- Poor NT Comments   Sitting Static [x] [] [] [] [] []    Sitting Dynamic [x] [] [] [] [] []              Standing Static [] [x] [] [] [] []    Standing Dynamic [] [x] [] [] [] []      GAIT: I Mod I S SBA CGA Min Mod Max Total  NT x2 Comments:   Level of Assistance [] [] [] [x] [] [] [] [] [] [] []    Distance 350  feet    DME Rolling Walker    Gait Quality Decreased aura  and Decreased step length    Weightbearing Status      Stairs      I=Independent, Mod I=Modified Independent, S=Supervision, SBA=Standby Assistance, CGA=Contact Guard Assistance,   Min=Minimal Assistance, Mod=Moderate Assistance, Max=Maximal Assistance, Total=Total Assistance, NT=Not

## 2024-12-12 NOTE — PROGRESS NOTES
Oncology assumed the care.  Appreciate oncology.    Will sign off.  Please do not hesitate to consult us if you have any new concerns/issues.

## 2024-12-12 NOTE — PLAN OF CARE
Problem: Safety - Adult  Goal: Free from fall injury  12/12/2024 0201 by Delia Andino RN  Outcome: Progressing  12/11/2024 1927 by Bina Urban RN  Outcome: Progressing     Problem: Skin/Tissue Integrity  Goal: Absence of new skin breakdown  Description: 1.  Monitor for areas of redness and/or skin breakdown  2.  Assess vascular access sites hourly  3.  Every 4-6 hours minimum:  Change oxygen saturation probe site  4.  Every 4-6 hours:  If on nasal continuous positive airway pressure, respiratory therapy assess nares and determine need for appliance change or resting period.  12/12/2024 0201 by Delia Andino RN  Outcome: Progressing  12/11/2024 1927 by Bina Urban RN  Outcome: Progressing     Problem: Confusion  Goal: Confusion, delirium, dementia, or psychosis is improved or at baseline  Description: INTERVENTIONS:  1. Assess for possible contributors to thought disturbance, including medications, impaired vision or hearing, underlying metabolic abnormalities, dehydration, psychiatric diagnoses, and notify attending LIP  2. [place] high risk fall precautions, as indicated  3. Provide frequent short contacts to provide reality reorientation, refocusing and direction  4. Decrease environmental stimuli, including noise as appropriate  5. Monitor and intervene to maintain adequate nutrition, hydration, elimination, sleep and activity  6. If unable to ensure safety without constant attention obtain sitter and review sitter guidelines with assigned personnel  7. Initiate Psychosocial CNS and Spiritual Care consult, as indicated  12/12/2024 0201 by Delia Andino RN  Outcome: Progressing  12/11/2024 1927 by Bina Urban RN  Outcome: Progressing

## 2024-12-12 NOTE — PROGRESS NOTES
GOALS:  LTG: Patient will maintain adequate hydration/nutrition with optimum safety and efficiency of swallowing function with PO intake without overt signs or symptoms of aspiration for the highest appropriate diet level.  STG: MET 24  Patient will consume regular textures and thin liquids without overt signs or symptoms of airway compromise.  Patient will safely ingest diet trials during therapeutic feedings with SLP without overt signs or symptoms of respiratory compromise in efforts to advance diet.    LTG: Patient will improve neuro-linguistic abilities to increase safety and awareness in functional living environment.     Patient will utilize visual supports/aids to improve orientation with min cues. MET 24    SPEECH LANGUAGE PATHOLOGY: Dysphagia Daily Note #3 and Discharge    Acknowledge Order  I  Therapy Time  I   Charges     I  Rehab Caseload Tracker    NAME: Haven Hui  : 1945  MRN: [id number]    ADMISSION DATE: 2024  PRIMARY DIAGNOSIS: Vasogenic edema (HCC)    ICD-10: Treatment Diagnosis:   R13.12 Dysphagia, Oropharyngeal Phase  R41.841 Cognitive-Communication Deficit    RECOMMENDATIONS   Diet:    Regular Consistency  Thin Liquids    Medication: as tolerated   Compensatory Swallowing Strategies:   Slow rate of intake  Small bites/sips  Upright as possible for all oral intake   Therapeutic Intervention:   Patient/family education  Dysphagia treatment  Cognitive-linguistic treatment   Patient continues to require skilled intervention:  No. Please re-consult if new concerns arise.      Anticipated Discharge Needs: No additional speech therapy is indicated.      ASSESSMENT    Patient is tolerating mixed consistency fruit cup and course crackers with no overt signs or symptoms of airway compromise. Patient has upper dentures in place, exhibiting adequate mastication prior to swallow and no oral residue post swallow. Pt agrees with diet upgrade, stating she believes swallow

## 2024-12-12 NOTE — CARE COORDINATION
STR and HH choice list left at bedside for pt and family to review. Family to be in this morning.    Update 1315: Family has not been in yet pt expects them to be here between 5 and 6.    Update 1600: chart reviewed. PT no longer recommending STR pt walked 350 ft with her RW.    Update 1715: Spoke with pt at bedside and Lisandro pt's children. Made them aware that pt would not qualify for STR walking as well as she does. Discussed having a HH nurse come out. Family has questions about how long she will stay in the hospital as they were expecting her to stay at least through the weekend and possibly through XRT. Explained that XRT is an out pt treatment and pts do not normally stay in the hospital for XRT. They are requesting that either the NP or the Dr call them tomorrow with the updated plan.

## 2024-12-13 ENCOUNTER — APPOINTMENT (OUTPATIENT)
Dept: RADIATION ONCOLOGY | Age: 79
End: 2024-12-13
Payer: MEDICARE

## 2024-12-13 ENCOUNTER — HOSPITAL ENCOUNTER (OUTPATIENT)
Dept: RADIATION ONCOLOGY | Age: 79
Setting detail: RECURRING SERIES
Discharge: HOME | End: 2024-12-16
Payer: MEDICARE

## 2024-12-13 ENCOUNTER — TELEPHONE (OUTPATIENT)
Dept: ONCOLOGY | Age: 79
End: 2024-12-13

## 2024-12-13 LAB
ALBUMIN SERPL-MCNC: 2.9 G/DL (ref 3.2–4.6)
ALBUMIN/GLOB SERPL: 1 (ref 1–1.9)
ALP SERPL-CCNC: 81 U/L (ref 35–104)
ALT SERPL-CCNC: 91 U/L (ref 8–45)
ANION GAP SERPL CALC-SCNC: 12 MMOL/L (ref 7–16)
AST SERPL-CCNC: 33 U/L (ref 15–37)
BASOPHILS # BLD: 0.1 K/UL (ref 0–0.2)
BASOPHILS NFR BLD: 1 % (ref 0–2)
BILIRUB SERPL-MCNC: 0.3 MG/DL (ref 0–1.2)
BUN SERPL-MCNC: 18 MG/DL (ref 8–23)
CALCIUM SERPL-MCNC: 8.7 MG/DL (ref 8.8–10.2)
CHLORIDE SERPL-SCNC: 103 MMOL/L (ref 98–107)
CO2 SERPL-SCNC: 24 MMOL/L (ref 20–29)
CREAT SERPL-MCNC: 0.6 MG/DL (ref 0.6–1.1)
DIFFERENTIAL METHOD BLD: ABNORMAL
EOSINOPHIL # BLD: 0 K/UL (ref 0–0.8)
EOSINOPHIL NFR BLD: 0 % (ref 0.5–7.8)
ERYTHROCYTE [DISTWIDTH] IN BLOOD BY AUTOMATED COUNT: 12.1 % (ref 11.9–14.6)
GLOBULIN SER CALC-MCNC: 2.8 G/DL (ref 2.3–3.5)
GLUCOSE SERPL-MCNC: 120 MG/DL (ref 70–99)
HCT VFR BLD AUTO: 39.7 % (ref 35.8–46.3)
HGB BLD-MCNC: 13.7 G/DL (ref 11.7–15.4)
IMM GRANULOCYTES # BLD AUTO: 0.3 K/UL (ref 0–0.5)
IMM GRANULOCYTES NFR BLD AUTO: 3 % (ref 0–5)
LYMPHOCYTES # BLD: 1.1 K/UL (ref 0.5–4.6)
LYMPHOCYTES NFR BLD: 9 % (ref 13–44)
MAGNESIUM SERPL-MCNC: 2.3 MG/DL (ref 1.8–2.4)
MCH RBC QN AUTO: 32.5 PG (ref 26.1–32.9)
MCHC RBC AUTO-ENTMCNC: 34.5 G/DL (ref 31.4–35)
MCV RBC AUTO: 94.1 FL (ref 82–102)
MONOCYTES # BLD: 0.7 K/UL (ref 0.1–1.3)
MONOCYTES NFR BLD: 6 % (ref 4–12)
NEUTS SEG # BLD: 10.1 K/UL (ref 1.7–8.2)
NEUTS SEG NFR BLD: 81 % (ref 43–78)
NRBC # BLD: 0 K/UL (ref 0–0.2)
PLATELET # BLD AUTO: 262 K/UL (ref 150–450)
PMV BLD AUTO: 11.5 FL (ref 9.4–12.3)
POTASSIUM SERPL-SCNC: 4.1 MMOL/L (ref 3.5–5.1)
PROT SERPL-MCNC: 5.7 G/DL (ref 6.3–8.2)
RAD ONC ARIA COURSE FIRST TREATMENT DATE: NORMAL
RAD ONC ARIA COURSE ID: NORMAL
RAD ONC ARIA COURSE INTENT: NORMAL
RAD ONC ARIA COURSE LAST TREATMENT DATE: NORMAL
RAD ONC ARIA COURSE SESSION NUMBER: 1
RAD ONC ARIA COURSE START DATE: NORMAL
RAD ONC ARIA COURSE TREATMENT ELAPSED DAYS: 0
RAD ONC ARIA PLAN FRACTIONS TREATED TO DATE: 1
RAD ONC ARIA PLAN ID: NORMAL
RAD ONC ARIA PLAN PRESCRIBED DOSE PER FRACTION: 21 GY
RAD ONC ARIA PLAN PRESCRIBED DOSE PER FRACTION: 6 GY
RAD ONC ARIA PLAN PRESCRIBED DOSE PER FRACTION: 6 GY
RAD ONC ARIA PLAN PRIMARY REFERENCE POINT: NORMAL
RAD ONC ARIA PLAN TOTAL FRACTIONS PRESCRIBED: 1
RAD ONC ARIA PLAN TOTAL FRACTIONS PRESCRIBED: 5
RAD ONC ARIA PLAN TOTAL FRACTIONS PRESCRIBED: 5
RAD ONC ARIA PLAN TOTAL PRESCRIBED DOSE: 2100 CGY
RAD ONC ARIA PLAN TOTAL PRESCRIBED DOSE: 3000 CGY
RAD ONC ARIA PLAN TOTAL PRESCRIBED DOSE: 3000 CGY
RAD ONC ARIA REFERENCE POINT DOSAGE GIVEN TO DATE: 21 GY
RAD ONC ARIA REFERENCE POINT DOSAGE GIVEN TO DATE: 6 GY
RAD ONC ARIA REFERENCE POINT DOSAGE GIVEN TO DATE: 6 GY
RAD ONC ARIA REFERENCE POINT ID: NORMAL
RAD ONC ARIA REFERENCE POINT SESSION DOSAGE GIVEN: 21 GY
RAD ONC ARIA REFERENCE POINT SESSION DOSAGE GIVEN: 6 GY
RAD ONC ARIA REFERENCE POINT SESSION DOSAGE GIVEN: 6 GY
RBC # BLD AUTO: 4.22 M/UL (ref 4.05–5.2)
SODIUM SERPL-SCNC: 138 MMOL/L (ref 136–145)
WBC # BLD AUTO: 12.2 K/UL (ref 4.3–11.1)

## 2024-12-13 PROCEDURE — 1170000000 HC RM PRIVATE ONCOLOGY

## 2024-12-13 PROCEDURE — 51702 INSERT TEMP BLADDER CATH: CPT

## 2024-12-13 PROCEDURE — 85025 COMPLETE CBC W/AUTO DIFF WBC: CPT

## 2024-12-13 PROCEDURE — 2580000003 HC RX 258: Performed by: STUDENT IN AN ORGANIZED HEALTH CARE EDUCATION/TRAINING PROGRAM

## 2024-12-13 PROCEDURE — 97535 SELF CARE MNGMENT TRAINING: CPT

## 2024-12-13 PROCEDURE — 6370000000 HC RX 637 (ALT 250 FOR IP): Performed by: STUDENT IN AN ORGANIZED HEALTH CARE EDUCATION/TRAINING PROGRAM

## 2024-12-13 PROCEDURE — 6360000002 HC RX W HCPCS: Performed by: STUDENT IN AN ORGANIZED HEALTH CARE EDUCATION/TRAINING PROGRAM

## 2024-12-13 PROCEDURE — 80053 COMPREHEN METABOLIC PANEL: CPT

## 2024-12-13 PROCEDURE — 6370000000 HC RX 637 (ALT 250 FOR IP)

## 2024-12-13 PROCEDURE — 51798 US URINE CAPACITY MEASURE: CPT

## 2024-12-13 PROCEDURE — 99233 SBSQ HOSP IP/OBS HIGH 50: CPT | Performed by: INTERNAL MEDICINE

## 2024-12-13 PROCEDURE — 6370000000 HC RX 637 (ALT 250 FOR IP): Performed by: INTERNAL MEDICINE

## 2024-12-13 PROCEDURE — 6360000002 HC RX W HCPCS

## 2024-12-13 PROCEDURE — 51701 INSERT BLADDER CATHETER: CPT

## 2024-12-13 PROCEDURE — 6370000000 HC RX 637 (ALT 250 FOR IP): Performed by: HOSPITALIST

## 2024-12-13 PROCEDURE — 83735 ASSAY OF MAGNESIUM: CPT

## 2024-12-13 PROCEDURE — 6370000000 HC RX 637 (ALT 250 FOR IP): Performed by: NURSE PRACTITIONER

## 2024-12-13 PROCEDURE — 36415 COLL VENOUS BLD VENIPUNCTURE: CPT

## 2024-12-13 PROCEDURE — 6360000002 HC RX W HCPCS: Performed by: NURSE PRACTITIONER

## 2024-12-13 RX ORDER — LEVETIRACETAM 500 MG/1
500 TABLET ORAL 2 TIMES DAILY
Qty: 60 TABLET | Refills: 3 | Status: SHIPPED | OUTPATIENT
Start: 2024-12-13 | End: 2024-12-13

## 2024-12-13 RX ORDER — BUTALBITAL, ACETAMINOPHEN AND CAFFEINE 50; 325; 40 MG/1; MG/1; MG/1
1 TABLET ORAL EVERY 4 HOURS PRN
Qty: 180 TABLET | Refills: 3 | Status: SHIPPED | OUTPATIENT
Start: 2024-12-13

## 2024-12-13 RX ORDER — TEMAZEPAM 15 MG/1
15 CAPSULE ORAL NIGHTLY PRN
Qty: 10 CAPSULE | Refills: 0 | Status: SHIPPED | OUTPATIENT
Start: 2024-12-13 | End: 2024-12-27

## 2024-12-13 RX ORDER — ONDANSETRON 4 MG/1
4 TABLET, ORALLY DISINTEGRATING ORAL EVERY 8 HOURS PRN
Qty: 30 TABLET | Refills: 0 | Status: SHIPPED | OUTPATIENT
Start: 2024-12-13

## 2024-12-13 RX ORDER — LORAZEPAM 0.5 MG/1
0.5 TABLET ORAL EVERY 4 HOURS PRN
Status: DISCONTINUED | OUTPATIENT
Start: 2024-12-13 | End: 2024-12-14 | Stop reason: HOSPADM

## 2024-12-13 RX ORDER — POLYETHYLENE GLYCOL 3350 17 G/17G
17 POWDER, FOR SOLUTION ORAL DAILY PRN
COMMUNITY
Start: 2024-12-13 | End: 2025-01-12

## 2024-12-13 RX ORDER — DEXAMETHASONE 4 MG/1
4 TABLET ORAL EVERY 6 HOURS SCHEDULED
Status: DISCONTINUED | OUTPATIENT
Start: 2024-12-13 | End: 2024-12-13

## 2024-12-13 RX ORDER — DEXAMETHASONE 4 MG/1
4 TABLET ORAL EVERY 6 HOURS
Qty: 120 TABLET | Refills: 0 | Status: SHIPPED | OUTPATIENT
Start: 2024-12-13 | End: 2024-12-18 | Stop reason: SDUPTHER

## 2024-12-13 RX ORDER — PANTOPRAZOLE SODIUM 40 MG/1
40 TABLET, DELAYED RELEASE ORAL
Qty: 90 TABLET | Refills: 1 | Status: SHIPPED | OUTPATIENT
Start: 2024-12-13

## 2024-12-13 RX ORDER — SENNOSIDES A AND B 8.6 MG/1
2 TABLET, FILM COATED ORAL 2 TIMES DAILY
Qty: 120 TABLET | Refills: 0 | Status: SHIPPED | OUTPATIENT
Start: 2024-12-13 | End: 2025-01-12

## 2024-12-13 RX ORDER — DEXAMETHASONE 4 MG/1
4 TABLET ORAL EVERY 6 HOURS
Status: DISCONTINUED | OUTPATIENT
Start: 2024-12-13 | End: 2024-12-14 | Stop reason: HOSPADM

## 2024-12-13 RX ORDER — LEVETIRACETAM 500 MG/1
500 TABLET ORAL 2 TIMES DAILY
Status: DISCONTINUED | OUTPATIENT
Start: 2024-12-13 | End: 2024-12-14 | Stop reason: HOSPADM

## 2024-12-13 RX ORDER — LORAZEPAM 0.5 MG/1
0.5 TABLET ORAL EVERY 8 HOURS PRN
Qty: 4 TABLET | Refills: 0 | Status: SHIPPED | OUTPATIENT
Start: 2024-12-13 | End: 2025-01-12

## 2024-12-13 RX ORDER — TAMSULOSIN HYDROCHLORIDE 0.4 MG/1
0.4 CAPSULE ORAL DAILY
Qty: 30 CAPSULE | Refills: 3 | Status: SHIPPED | OUTPATIENT
Start: 2024-12-14

## 2024-12-13 RX ORDER — LEVETIRACETAM 500 MG/1
500 TABLET ORAL EVERY 12 HOURS
Qty: 60 TABLET | Refills: 3 | Status: SHIPPED | OUTPATIENT
Start: 2024-12-13

## 2024-12-13 RX ORDER — DICYCLOMINE HYDROCHLORIDE 10 MG/1
10 CAPSULE ORAL 4 TIMES DAILY PRN
Qty: 120 CAPSULE | Refills: 3 | Status: SHIPPED | OUTPATIENT
Start: 2024-12-13

## 2024-12-13 RX ORDER — DEXAMETHASONE 4 MG/1
4 TABLET ORAL EVERY 6 HOURS
Qty: 40 TABLET | Refills: 0 | Status: SHIPPED | OUTPATIENT
Start: 2024-12-13 | End: 2024-12-13

## 2024-12-13 RX ADMIN — LEVETIRACETAM 500 MG: 500 TABLET, FILM COATED ORAL at 21:10

## 2024-12-13 RX ADMIN — DEXAMETHASONE 4 MG: 4 TABLET ORAL at 18:26

## 2024-12-13 RX ADMIN — LEVETIRACETAM 500 MG: 100 INJECTION INTRAVENOUS at 11:08

## 2024-12-13 RX ADMIN — HYDRALAZINE HYDROCHLORIDE 10 MG: 20 INJECTION INTRAMUSCULAR; INTRAVENOUS at 07:17

## 2024-12-13 RX ADMIN — BUTALBITAL, ACETAMINOPHEN, AND CAFFEINE 1 TABLET: 325; 50; 40 TABLET ORAL at 08:37

## 2024-12-13 RX ADMIN — Medication 250 MG: at 11:10

## 2024-12-13 RX ADMIN — LEVOTHYROXINE SODIUM 50 MCG: 0.05 TABLET ORAL at 05:44

## 2024-12-13 RX ADMIN — DEXAMETHASONE SODIUM PHOSPHATE 4 MG: 4 INJECTION INTRA-ARTICULAR; INTRALESIONAL; INTRAMUSCULAR; INTRAVENOUS; SOFT TISSUE at 11:08

## 2024-12-13 RX ADMIN — TEMAZEPAM 15 MG: 15 CAPSULE ORAL at 21:10

## 2024-12-13 RX ADMIN — SODIUM CHLORIDE, PRESERVATIVE FREE 10 ML: 5 INJECTION INTRAVENOUS at 21:11

## 2024-12-13 RX ADMIN — SODIUM CHLORIDE, PRESERVATIVE FREE 10 ML: 5 INJECTION INTRAVENOUS at 08:32

## 2024-12-13 RX ADMIN — LORAZEPAM 0.5 MG: 0.5 TABLET ORAL at 11:09

## 2024-12-13 RX ADMIN — CHOLECALCIFEROL TAB 125 MCG (5000 UNIT) 5000 UNITS: 125 TAB at 08:32

## 2024-12-13 RX ADMIN — SODIUM CHLORIDE 40 MG: 9 INJECTION INTRAMUSCULAR; INTRAVENOUS; SUBCUTANEOUS at 08:31

## 2024-12-13 RX ADMIN — DEXAMETHASONE SODIUM PHOSPHATE 4 MG: 4 INJECTION INTRA-ARTICULAR; INTRALESIONAL; INTRAMUSCULAR; INTRAVENOUS; SOFT TISSUE at 05:44

## 2024-12-13 RX ADMIN — DEXAMETHASONE 4 MG: 4 TABLET ORAL at 21:10

## 2024-12-13 RX ADMIN — TAMSULOSIN HYDROCHLORIDE 0.4 MG: 0.4 CAPSULE ORAL at 08:32

## 2024-12-13 ASSESSMENT — PAIN SCALES - GENERAL
PAINLEVEL_OUTOF10: 5
PAINLEVEL_OUTOF10: 2

## 2024-12-13 NOTE — TELEPHONE ENCOUNTER
Returned call to patient's son.  Informed will defer inpatient questions to inpatient team.  Verbalized understanding.

## 2024-12-13 NOTE — PROGRESS NOTES
ACUTE OCCUPATIONAL THERAPY GOALS:   (Developed with and agreed upon by patient and/or caregiver.)  1. Patient will complete upper body bathing and dressing with SBA and adaptive equipment as needed.   2. Patient will complete toileting with minimal assistance.   3. Patient will tolerate 25 minutes of OT treatment with 1-2 rest breaks to increase activity tolerance for ADLs.   4. Patient will complete functional transfers with minimal assistance and adaptive equipment as needed.   5. Patient will complete sitting balance edge of bed with supervision while participating in ADL.  6. Patient will complete therapeutic exercises for 8 minutes to improve strength for ADL/functional transfers.      Timeframe: 7 visits     OCCUPATIONAL THERAPY: Daily Note   OT Visit Days: 3   Time In/Out  OT Charge Capture  Rehab Caseload Tracker  OT Orders    Haven Hui is a 79 y.o. female   PRIMARY DIAGNOSIS: Vasogenic edema (HCC)  Metastatic adenocarcinoma to brain (HCC) [C79.31]  Vasogenic edema (HCC) [G93.6]       Inpatient: Payor: HU[employer]NA MEDICARE / Plan: [employer] GOLD PLUS HMO / Product Type: *No Product type* /     ASSESSMENT:     REHAB RECOMMENDATIONS:   Recommendation to date pending progress:  Setting:  Home Health Therapy    Equipment:    3 in 1 Bedside Commode (Patient confined to one level of home without toilet)  Rolling Walker     ASSESSMENT:  Ms. Hui is progressing well towards OT goals. Today, pt was received supine in bed. Pt completed functional transfers and mobility within room and bathroom with CGA and HHA/ use of grab bars. Stood at sink for full body bathing, dressing, and grooming tasks with assist. Lengthy education provided on discharge recommendation being updated to HH therapies (instead of rehab). Pt will have her daughter living with her until the end of January to assist her as needed and also has other supportive family/friends in the area. Ms. Hui continues to demonstrate overall

## 2024-12-13 NOTE — PROGRESS NOTES
EOS:    Patient's esqueda catheter was removed at 1400. As of 1820, patient had not yet voided. Patient was bladder scanned and there was 120cc. Patient to stay overnight to await urination on her own.     Patient states her eyesight is blurry.    Please call son, Darron or daughter, Sara when in the room to discuss patient updates. Numbers are on the whiteboard in the patient's room.

## 2024-12-13 NOTE — TELEPHONE ENCOUNTER
Physician provider: Dr. Corral  Reason for today's call (Please detail here patients chief complaint): Pt's son has ?'s re: pt's current admission  Last office visit:na  Patient Callback Number: [phone]  Was callback number verified?: Yes  Preferred pharmacy (If refill request): na  Calls to office within the last 48 hours?:No    Patient notified that their information will be routed to the [facility] clinical triage team for review. Patient is advised that they will receive a phone call from the triage department. If symptom related and symptoms worsen before receiving a call back, the patient has been advised to proceed to the nearest ED.      Pt. Santiago Rob has questions:    Update on pt's condition/progression, if any  When will she be discharged and who is making that call. Son understands that she should be staying through the weekend due to radiation treatment.     [phone]

## 2024-12-13 NOTE — DISCHARGE SUMMARY
DLP is 1084.88 mGy-cm.     Report signed on 12/09/2024 (02:38 Eastern Time)  Signed by: Scotty Hansen M.D.  Reading Location: 235           Exam Ended: 12/09/24 02:21 EST Last Resulted: 12/09/24 02:38 EST          CT HEAD WO CONTRAST  Order: [id number]  Status: Final result       Visible to patient: Yes (not seen)       Next appt: 12/16/2024 at 03:00 PM in Radiation Oncology ([facility] RAD ONC EDGE)    0 Result Notes  Details    Reading Physician Reading Date Result Priority   Scotty Swain Jr., MD  [phone] 12/7/2024      Narrative & Impression  Head CT     INDICATION: neuro changes     TECHNIQUE: Multiple 2D axial images obtained through the brain without  intravenous contrast.  Radiation dose reduction techniques were used for this  study:  All CT scans performed at this facility use one or all of the following:  Automated exposure control, adjustment of the mA and/or kVp according to  patient's size, iterative reconstruction.     COMPARISON: 12/7/2024 earlier the same day     FINDINGS:  Large left frontal and right occipital mixed high density lesions with extensive  white matter edema and surrounding mass effect. High right frontal and right  parietal lesions better demonstrated on recent MRI. Left to right subfalcine  shift anteriorly of approximately 4 mm appears unchanged. Cortical sulci are  effaced. The ventricular system is compressed with complete effacement of the  atrium of the right lateral ventricle.     The sinuses are clear. There are no bony lesions.     IMPRESSION:  Similar appearance of multifocal cerebral metastases with marked  vasogenic edema and mass effect. Similar anterior left to right subfalcine  shift.     Electronically signed by SCOTTY SWAIN JR           Exam Ended: 12/07/24 13:25 EST Last Resulted: 12/07/24 13:51 EST          CT HEAD WO CONTRAST  Order: [id number]  Status: Final result       Visible to patient: Yes (not seen)       Next appt: 12/16/2024 at 03:00 PM in  malignant neoplasm of unspecified site; Abnormal results  of function studies of other organs and systems; Unspecified visual disturbance     COMPARISON:  None     Standard MRI sequences were obtained through the brain in multiple planes.  Images were obtained before and after intravenous infusion of 10 mL of ProHance.     FINDINGS:  No extra-axial fluid collection is noted. There is left to right midline shift.  There are multiple bilateral supratentorial metastases with significant  surrounding vasogenic edema including in the left frontal lobe where there is  sulcal effacement with a large 2.8 x 3.1 x 2.7 cm enhancing lesion, right  posterior temporal occipital lesion with extensive surrounding vasogenic edema  measuring 2.3 x 3.1 x 2.8 cm in size. Additional 1 cm enhancing lesion in the  frontoparietal high convexity measuring 1 x 1.2 cm in size. Furthermore an  additional high convexity lesion measuring 1.5 x 1 cm in size in the left  frontoparietal high convexity. Extensive vasogenic edema. Is also appreciated.     5 mm subtle enhancing lesion at the left frontal parietal region abutting a deep  sulcus is appreciated.     No intracranial hemorrhage or midline shift     Visualized paranasal sinuses are aerated.     IMPRESSION:  1. Multiple bilateral supratentorial metastases with extensive vasogenic edema  and sulcal effacement in the region of the edema especially in the left frontal  parietal region. Furthermore there is approximately 4 to 5 mm left to right  midline shift.     Electronically signed by Ruben Webber           Exam Ended: 12/05/24 17:43 EST Last Resulted: 12/05/24 18:33 EST           Medication List        START taking these medications      butalbital-acetaminophen-caffeine -40 MG per tablet  Commonly known as: FIORICET, ESGIC  Take 1 tablet by mouth every 4 hours as needed for Headaches     dexAMETHasone 4 MG tablet  Commonly known as: DECADRON  Take 1 tablet by mouth every 6 hours

## 2024-12-13 NOTE — CARE COORDINATION
CURLY DONIS'Brien made aware that the family wishes to speak with her or Dr Corral.    Update 1500: Nurse updated this CM that son wished to speak with this CM before pt was discharged home. Discussed at length why pt would continue XRT as an outpt as she was no longer a candidate for rehab. Explained CMS guidelines to pt and son Darron. Reviewed the difference between HH, Palliative care and home hospice. Discussed private pay services for care for Mom. Son would like a referral for HH with RN, PT/OT, aide and SW services. Explained that orders would be sent but HH has their own CMS guidelines to follow and they decide how many visits and what she qualifies for. He verbalized understanding.   Pt and family have already spoken with [facility] for information. [facility] is unable to staff case. List of  agencies with insurance and quality metrics given. Family is interested in private pay care and [facility] has that available on their service line. Referral and orders sent to [facility] and they accepted pt.   Therapy recommending 3-1 BSC. Son and pt would like the commode. Order and clinicals sent to [facility] as pt has [employer] insurance.Pt and family aware [facility] will deliver commode to her home if insurance will approve it.

## 2024-12-14 VITALS
HEART RATE: 67 BPM | RESPIRATION RATE: 19 BRPM | TEMPERATURE: 98.1 F | WEIGHT: 116.62 LBS | DIASTOLIC BLOOD PRESSURE: 61 MMHG | OXYGEN SATURATION: 93 % | SYSTOLIC BLOOD PRESSURE: 104 MMHG | HEIGHT: 62 IN | BODY MASS INDEX: 21.46 KG/M2

## 2024-12-14 PROCEDURE — 6360000002 HC RX W HCPCS

## 2024-12-14 PROCEDURE — 6370000000 HC RX 637 (ALT 250 FOR IP): Performed by: STUDENT IN AN ORGANIZED HEALTH CARE EDUCATION/TRAINING PROGRAM

## 2024-12-14 PROCEDURE — 2580000003 HC RX 258: Performed by: STUDENT IN AN ORGANIZED HEALTH CARE EDUCATION/TRAINING PROGRAM

## 2024-12-14 PROCEDURE — 6360000002 HC RX W HCPCS: Performed by: STUDENT IN AN ORGANIZED HEALTH CARE EDUCATION/TRAINING PROGRAM

## 2024-12-14 PROCEDURE — 99239 HOSP IP/OBS DSCHRG MGMT >30: CPT | Performed by: INTERNAL MEDICINE

## 2024-12-14 PROCEDURE — 6370000000 HC RX 637 (ALT 250 FOR IP)

## 2024-12-14 PROCEDURE — 6370000000 HC RX 637 (ALT 250 FOR IP): Performed by: HOSPITALIST

## 2024-12-14 RX ADMIN — Medication 250 MG: at 08:58

## 2024-12-14 RX ADMIN — DEXAMETHASONE 4 MG: 4 TABLET ORAL at 11:40

## 2024-12-14 RX ADMIN — DEXAMETHASONE 4 MG: 4 TABLET ORAL at 05:51

## 2024-12-14 RX ADMIN — TAMSULOSIN HYDROCHLORIDE 0.4 MG: 0.4 CAPSULE ORAL at 08:58

## 2024-12-14 RX ADMIN — LEVOTHYROXINE SODIUM 50 MCG: 0.05 TABLET ORAL at 05:51

## 2024-12-14 RX ADMIN — LEVETIRACETAM 500 MG: 500 TABLET, FILM COATED ORAL at 08:58

## 2024-12-14 RX ADMIN — CHOLECALCIFEROL TAB 125 MCG (5000 UNIT) 5000 UNITS: 125 TAB at 08:58

## 2024-12-14 RX ADMIN — SODIUM CHLORIDE 40 MG: 9 INJECTION INTRAMUSCULAR; INTRAVENOUS; SUBCUTANEOUS at 08:58

## 2024-12-14 RX ADMIN — BUTALBITAL, ACETAMINOPHEN, AND CAFFEINE 1 TABLET: 325; 50; 40 TABLET ORAL at 09:12

## 2024-12-14 RX ADMIN — SODIUM CHLORIDE, PRESERVATIVE FREE 10 ML: 5 INJECTION INTRAVENOUS at 08:59

## 2024-12-14 NOTE — CARE COORDINATION
Pt is for discharge home today with family.  Referral has been called/sent to [facility]  for follow up home care as ordered.  Per notes a BSC was ordered from [facility] for delivery to home.  No additional CM orders received or supportive care needs expressed at this time.     12/14/24 7920   Service Assessment   Patient's Healthcare Decision Maker is: Legal Next of Kin   Social/Functional History   Lives With Alone   Type of Home House   Home Layout One level   Home Access Level entry   Entrance Stairs - Number of Steps 6   Bathroom Shower/Tub Tub/Shower unit   Bathroom Toilet Standard   Bathroom Accessibility Accessible   Home Equipment None   Receives Help From Family   Prior Level of Assist for ADLs Independent   Prior Level of Assist for Homemaking Independent   Homemaking Responsibilities Yes   Ambulation Assistance Independent   Prior Level of Assist for Transfers Independent   Active  No   Mode of Transportation Car   Occupation Retired   Services At/After Discharge   Transition of Care Consult (CM Consult) Home Health;Discharge Planning;DME/Supply Assistance   Internal Home Health No   Reason Outside Agency Chosen Script used patient chose alternate agency  ([facility] )   Services At/After Discharge Home Health;PT;OT;Nursing services;DME  ([facility]  and [email or web address] for BSC)    Resource Information Provided? No   Mode of Transport at Discharge Other (see comment)  (family)   Confirm Follow Up Transport Family   Condition of Participation: Discharge Planning   The Plan for Transition of Care is related to the following treatment goals: Pt will return home with family and home health for supportive care.   The Patient and/or Patient Representative was provided with a Choice of Provider? Patient Representative   Name of the Patient Representative who was provided with the Choice of Provider and agrees with the Discharge Plan?  son   The Patient and/Or Patient Representative  agree with the Discharge Plan? Yes   Freedom of Choice list was provided with basic dialogue that supports the patient's individualized plan of care/goals, treatment preferences, and shares the quality data associated with the providers?  Yes

## 2024-12-14 NOTE — PLAN OF CARE
Problem: Discharge Planning  Goal: Discharge to home or other facility with appropriate resources  12/14/2024 1143 by Olga Lowery RN  Outcome: Adequate for Discharge  12/14/2024 1000 by Olga Lowery RN  Outcome: Progressing  Flowsheets  Taken 12/14/2024 0835 by Olga Lowery RN  Discharge to home or other facility with appropriate resources: Identify barriers to discharge with patient and caregiver  Taken 12/13/2024 2039 by Christina Sheets RN  Discharge to home or other facility with appropriate resources: Identify barriers to discharge with patient and caregiver     Problem: Pain  Goal: Verbalizes/displays adequate comfort level or baseline comfort level  12/14/2024 1143 by Olga Lowery RN  Outcome: Adequate for Discharge  12/14/2024 1000 by Olga Lowery RN  Outcome: Progressing  12/14/2024 0045 by Christina Sheets RN  Outcome: Progressing  Flowsheets (Taken 12/13/2024 1921)  Verbalizes/displays adequate comfort level or baseline comfort level:   Encourage patient to monitor pain and request assistance   Assess pain using appropriate pain scale     Problem: Safety - Adult  Goal: Free from fall injury  12/14/2024 1143 by Olga Lowery RN  Outcome: Adequate for Discharge  12/14/2024 1000 by Olga Lowery RN  Outcome: Progressing  12/14/2024 0045 by Christina Sheets RN  Outcome: Progressing     Problem: Skin/Tissue Integrity  Goal: Absence of new skin breakdown  Description: 1.  Monitor for areas of redness and/or skin breakdown  2.  Assess vascular access sites hourly  3.  Every 4-6 hours minimum:  Change oxygen saturation probe site  4.  Every 4-6 hours:  If on nasal continuous positive airway pressure, respiratory therapy assess nares and determine need for appliance change or resting period.  12/14/2024 1143 by Olga Lowery RN  Outcome: Adequate for Discharge  12/14/2024 1000 by Olga Lowery RN  Outcome: Progressing  12/14/2024 0045 by

## 2024-12-14 NOTE — PLAN OF CARE
Problem: Discharge Planning  Goal: Discharge to home or other facility with appropriate resources  Outcome: Progressing  Flowsheets (Taken 12/13/2024 2039 by Christina Sheets RN)  Discharge to home or other facility with appropriate resources: Identify barriers to discharge with patient and caregiver     Problem: Pain  Goal: Verbalizes/displays adequate comfort level or baseline comfort level  12/14/2024 1000 by Olga Lowery RN  Outcome: Progressing  12/14/2024 0045 by Christina Sheets RN  Outcome: Progressing  Flowsheets (Taken 12/13/2024 1921)  Verbalizes/displays adequate comfort level or baseline comfort level:   Encourage patient to monitor pain and request assistance   Assess pain using appropriate pain scale     Problem: Safety - Adult  Goal: Free from fall injury  12/14/2024 1000 by Olga Lowery RN  Outcome: Progressing  12/14/2024 0045 by Christina Sheets RN  Outcome: Progressing     Problem: Skin/Tissue Integrity  Goal: Absence of new skin breakdown  Description: 1.  Monitor for areas of redness and/or skin breakdown  2.  Assess vascular access sites hourly  3.  Every 4-6 hours minimum:  Change oxygen saturation probe site  4.  Every 4-6 hours:  If on nasal continuous positive airway pressure, respiratory therapy assess nares and determine need for appliance change or resting period.  12/14/2024 1000 by Olga Lowery RN  Outcome: Progressing  12/14/2024 0045 by Christina Sheets RN  Outcome: Progressing     Problem: Confusion  Goal: Confusion, delirium, dementia, or psychosis is improved or at baseline  Description: INTERVENTIONS:  1. Assess for possible contributors to thought disturbance, including medications, impaired vision or hearing, underlying metabolic abnormalities, dehydration, psychiatric diagnoses, and notify attending LIP  2. [place] high risk fall precautions, as indicated  3. Provide frequent short contacts to provide reality reorientation, refocusing and direction  4.

## 2024-12-14 NOTE — PROGRESS NOTES
0708: Received pt from KAYCEE Vasquez in stable condition. Pt in bed resting quietly. Resp even & unlabored on room air; no acute signs of distress noted. Bed low & locked; call light in reach; no needs voiced.    1152: Discharge instructions & prescription info given to pt. Verbalized understanding. IV removed. Tip intact. Opportunity for questions provided. Instructed pt to call when ready to be taken down.

## 2024-12-14 NOTE — DISCHARGE INSTRUCTIONS
increased swelling in our hands or feet or shortness of breath while lying flat in bed.  Please call your doctor as soon as you notice any of these symptoms; do not wait until your next office visit.        The discharge information has been reviewed with the patient.  The patient verbalized understanding.  Discharge medications reviewed with the patient and appropriate educational materials and side effects teaching were provided.  ___________________________________________________________________________________________________________________________________

## 2024-12-14 NOTE — DISCHARGE SUMMARY
[facility]: Inpatient Hematology / Oncology Discharge Summary Note    Patient ID:  Haven Hui  [id number]  79 y.o.  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 *      Hospital Course:  Ms. Hui is a 79 year old female that a history of adenocarcinoma that sees Dr. Johnson outpatient. She also has a history of a PE after surgery without cor pulmonale and on Xarelto. 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 She was not interested in chemotherapy and wished for only surveillance at that time. In October 2024, she saw Dr. Johnson outpatient and PET scan was discussed that showed what appeared as active sites of disease in the supraclavicular lymph node, cervical spine, liver, and adrenal gland. L retroclavicular lymph node was biopsied on 11/4 and was c/w metastatic adenocarcinoma. MRI C-spine showed a lesion in C7 with involvement of the R pedicle and lateral mass and with cortical breakthrough along the R anterior lateral margin with a small paravertebral soft tissue extension. She was asymptomatic so XRT was held. MRI Brain was ordered to complete staging. MRI showed multiple brain masses with vasogenic edema with 5mm midline shift. Patient was sent to the ED. She has started Dex and Keppra. Neurosurgery has been consulted and patient was admitted to ICU. Please read

## 2024-12-15 NOTE — PROGRESS NOTES
CT head without IV contrast 12/6/2024     FINDINGS: The exam is stable. The skull base and calvarium are intact. There is  no sinusitis or mastoiditis. Again seen are bilateral supratentorial metastatic  deposits. The largest of these again is within the left frontal lobe and right  occipital lobe. There is partial calcification again seen without macroscopic  hemorrhage. An element of petechial hemorrhage is not excluded. By my  measurement right occipital lesion measures 3 x 2.6 cm and left frontal lesion  2.6 x 2.2 cm in the axial plane. Again seen and stable is extensive vasogenic  edema as well as stable 4 to 5 mm subfalcine shift. There is no transtentorial  herniation. Again seen is asymmetrical mild prominence of the right temporal  horn which is stable. No vielka hydrocephalus at this time is seen. There is no  CT evident acute bland ischemia.     IMPRESSION:  Stable supratentorial metastatic lesions with stable mass effect and vasogenic  edema.     Electronically signed by Brijesh Viera           Exam Ended: 12/07/24 03:54 EST Last Resulted: 12/07/24 04:14 EST          Narrative & Impression  Head CT     INDICATION: hx of mets to brain     TECHNIQUE: Multiple 2D axial images obtained through the brain without  intravenous contrast.  Radiation dose reduction techniques were used for this  study:  All CT scans performed at this facility use one or all of the following:  Automated exposure control, adjustment of the mA and/or kVp according to  patient's size, iterative reconstruction.     COMPARISON: Previous MRI dated December 5, 2024 1718 hours.     FINDINGS:  Extensive bilateral vasogenic edema with multiple brain metastases identified  largest in the left frontal lobe, right temporo-occipital lobe and right high  parietal lobe. These demonstrate hyperattenuation of a speckled nature. No  subarachnoid hemorrhage or subdural hemorrhage. Mild left to right midline  shift.      IMPRESSION:  Bilateral brain

## 2024-12-16 ENCOUNTER — CARE COORDINATION (OUTPATIENT)
Dept: CARE COORDINATION | Facility: CLINIC | Age: 79
End: 2024-12-16

## 2024-12-16 ENCOUNTER — HOSPITAL ENCOUNTER (OUTPATIENT)
Dept: RADIATION ONCOLOGY | Age: 79
Setting detail: RECURRING SERIES
Discharge: HOME | End: 2024-12-19
Payer: MEDICARE

## 2024-12-16 ENCOUNTER — TELEPHONE (OUTPATIENT)
Dept: RADIATION ONCOLOGY | Age: 79
End: 2024-12-16

## 2024-12-16 DIAGNOSIS — C79.31 METASTATIC ADENOCARCINOMA TO BRAIN (HCC): Primary | ICD-10-CM

## 2024-12-16 DIAGNOSIS — C79.31 COLON CANCER METASTASIZED TO BRAIN (HCC): Primary | ICD-10-CM

## 2024-12-16 DIAGNOSIS — Z79.899 HIGH RISK MEDICATION USE: ICD-10-CM

## 2024-12-16 DIAGNOSIS — C18.9 COLON CANCER METASTASIZED TO BRAIN (HCC): Primary | ICD-10-CM

## 2024-12-16 LAB
RAD ONC ARIA COURSE FIRST TREATMENT DATE: NORMAL
RAD ONC ARIA COURSE ID: NORMAL
RAD ONC ARIA COURSE INTENT: NORMAL
RAD ONC ARIA COURSE LAST TREATMENT DATE: NORMAL
RAD ONC ARIA COURSE SESSION NUMBER: 2
RAD ONC ARIA COURSE START DATE: NORMAL
RAD ONC ARIA COURSE TREATMENT ELAPSED DAYS: 3
RAD ONC ARIA PLAN FRACTIONS TREATED TO DATE: 2
RAD ONC ARIA PLAN FRACTIONS TREATED TO DATE: 2
RAD ONC ARIA PLAN ID: NORMAL
RAD ONC ARIA PLAN ID: NORMAL
RAD ONC ARIA PLAN PRESCRIBED DOSE PER FRACTION: 6 GY
RAD ONC ARIA PLAN PRESCRIBED DOSE PER FRACTION: 6 GY
RAD ONC ARIA PLAN PRIMARY REFERENCE POINT: NORMAL
RAD ONC ARIA PLAN PRIMARY REFERENCE POINT: NORMAL
RAD ONC ARIA PLAN TOTAL FRACTIONS PRESCRIBED: 5
RAD ONC ARIA PLAN TOTAL FRACTIONS PRESCRIBED: 5
RAD ONC ARIA PLAN TOTAL PRESCRIBED DOSE: 3000 CGY
RAD ONC ARIA PLAN TOTAL PRESCRIBED DOSE: 3000 CGY
RAD ONC ARIA REFERENCE POINT DOSAGE GIVEN TO DATE: 12 GY
RAD ONC ARIA REFERENCE POINT DOSAGE GIVEN TO DATE: 12 GY
RAD ONC ARIA REFERENCE POINT ID: NORMAL
RAD ONC ARIA REFERENCE POINT ID: NORMAL
RAD ONC ARIA REFERENCE POINT SESSION DOSAGE GIVEN: 6 GY
RAD ONC ARIA REFERENCE POINT SESSION DOSAGE GIVEN: 6 GY

## 2024-12-16 NOTE — CARE COORDINATION
Care Transitions Note    Initial Call - Call within 2 business days of discharge: Yes    Attempted to reach patient for transitions of care follow up. Unable to reach patient.    Outreach Attempts:   HIPAA compliant voicemail left for family, daughter,aSra.     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 ED to Hosp-Admission (Discharged) (ADMITTED) [id number] Fabio Corral MD; Clayton Nolen...            Was this an external facility discharge? No    Follow Up Appointment:   Patient has hospital follow up appointment scheduled within 7 days of discharge.    Future Appointments         Provider Specialty Dept Phone    2024 3:00 PM [facility] Radiation Oncology [phone]    2024 3:00 PM [facility] Radiation Oncology [phone]    2024 12:00 PM [facility] Radiation Oncology [phone]    2024 2:15 PM [facility] Radiation Oncology [phone]    1/3/2025 1:30 PM [facility] [phone]    1/3/2025 2:30 PM Tesha Johnson MD Oncology [phone]    2/3/2025 1:30 PM [facility] [phone]    2/3/2025 2:30 PM Tesha Johnson MD Oncology [phone]            Plan for follow-up on next business day.      Lisset Salguero, RN

## 2024-12-16 NOTE — ON TREATMENT VISIT
[facility] RADIATION ONCOLOGY ON TREATMENT VISIT    Patient: Haven Hui MRN: [id number]  SSN: [id number]    YOB: 1945  Age: 79 y.o.  Sex: female      12/16/24    Diagnosis:  Metastatic colon cancer    This is a 79 y.o. female who is currently receiving SRS for brain metastases.    R frontal #1: 21/21Gy in 1/1 fractions  R frontal #2: 21/21Gy in 1/1 fractions  L frontal: 6/30Gy in 1/5 fractions  R temporal: 6/30Gy in 1/5 fractions    No concurrent systemic therapy    Subjective:  Week 1: Persistent headaches.     Objective:  There were no vitals filed for this visit.  Pain 7/10    General: Alert and conversant, in NAD  Skin: Intact.    Assessment:  Patient is tolerating radiation therapy well with no treatment related toxicities at this time.    Plan:  -Continue dexamethasone, Fioricet, may add more Tylenol if needed. She will monitor headaches and let us know if symptoms worsen.   -Steroid taper after treatment is completed as below:  4mg every 6 hours for 7 days   4mg every 8 hours for 7 days  4mg every 12 hours for 7 days  4mg daily for 7 days  2mg daily for 7 days  Then stop  -Continue RT as planned.  -Treatment images reviewed.  -Follow up in 1 month with repeat brain MRI  -The patient has a documented plan of care to address pain.  Pain is controlled on current regimen.    Fernanda Villanueva MD  12/16/24    
Pt will meet >75% estimated nutrient needs as tolerated.

## 2024-12-16 NOTE — PROGRESS NOTES
[facility] H[facility] and On[facility]: Established patient - follow up     Chief Complaint   Patient presents with    Follow-up     Dx; colon adenocarcinoma - metastatic     Referral Diagnosis: Other acute pulmonary embolism without acute cor pulmonale; Anemia, unspecified type  Referring Provider: Jade Dyer MD  Family History of Cancer/ Hematology Disorders: Brother with unspecified type of cancer  Presenting Symptoms: Shortness of breath and heart palpitations    History of Present Illness:  Ms. Hiu is a 79 y.o. female who presents today for follow up regarding adenocarcinoma and hx of PE.  The past medical history is per below.    - At consultation, she was referred for recent acute pulmonary embolism after surgery without cor pulmonale and also on Xarelto, with anemia.  She was traveling to [place] and per Dr. Dyer, CT was scheduled prior to her trip.  Needed Xarelto refilled.  Due to patient's history of chronic anemia with hemoglobin ranging from 9-9.8 all her life per patient, proceeded w workup to include hemoglobin electrophoresis, CBC, CMP, and iron studies B12, vitamin D, viral serologies due to her recent transfusion, hapto, LDH SAGAR.  She does have dark stools being on iron every day.  We may need to alter her schedule depending on the results from today's blood work.  She has not had a colonoscopy in a long time and needs one but understands that she will most likely not be able to come off Xarelto with acute PE until repeat CT scan.  No hematuria or other bleeding noted.  - FU.  Patient is here for follow-up with her daughter.  [facility] events noted.  She presented to the hospital with obstructive symptoms and upon eval she was found to have suspected appendicitis.  Upon surgery, she was found to have cecal adenocarcinoma metastatic to 11 out of 20 lymph nodes.  She was also found to have a liver lesion with recommended follow-up imaging.  At this time, I recommend a PET scan to determine if the

## 2024-12-16 NOTE — CARE COORDINATION
appointments. Patient has hospital follow up appointment scheduled within 7 days of discharge.   Future Appointments         Provider Specialty Dept Phone    12/16/2024 3:00 PM [facility] Radiation Oncology [phone]    12/17/2024 3:00 PM [facility] Radiation Oncology [phone]    12/18/2024 12:00 PM [facility] Radiation Oncology [phone]    12/19/2024 2:15 PM [facility] Radiation Oncology [phone]    1/3/2025 1:30 PM [facility] [phone]    1/3/2025 2:30 PM Tesha Johnson MD Oncology [phone]    2/3/2025 1:30 PM [facility] [phone]    2/3/2025 2:30 PM Tesha Johnson MD Oncology [phone]            Care Transition Nurse provided contact information.  Plan for follow-up call in 6-10 days based on severity of symptoms and risk factors.  Plan for next call: symptom management     Lisset Salguero RN

## 2024-12-16 NOTE — TELEPHONE ENCOUNTER
Physician provider: Dr. Johnson  Reason for today's call (Please detail here patients chief complaint): Pt is having radiation now and request to speak with palliative care. Her son is with her as well.    Last office visit:na  Patient Callback Number: [phone]  Was callback number verified?: Yes  Preferred pharmacy (If refill request): na  Calls to office within the last 48 hours?:No    Patient notified that their information will be routed to the [facility] clinical triage team for review. Patient is advised that they will receive a phone call from the triage department. If symptom related and symptoms worsen before receiving a call back, the patient has been advised to proceed to the nearest ED.

## 2024-12-16 NOTE — TELEPHONE ENCOUNTER
Spoke with patient's son.  Patient requesting PC consult.  Request for appointment this week if possible.  Informed will forward to Dr. Johnson's team.

## 2024-12-17 ENCOUNTER — HOSPITAL ENCOUNTER (OUTPATIENT)
Dept: RADIATION ONCOLOGY | Age: 79
Setting detail: RECURRING SERIES
Discharge: HOME | End: 2024-12-20
Payer: MEDICARE

## 2024-12-17 LAB
RAD ONC ARIA COURSE FIRST TREATMENT DATE: NORMAL
RAD ONC ARIA COURSE ID: NORMAL
RAD ONC ARIA COURSE INTENT: NORMAL
RAD ONC ARIA COURSE LAST TREATMENT DATE: NORMAL
RAD ONC ARIA COURSE SESSION NUMBER: 3
RAD ONC ARIA COURSE START DATE: NORMAL
RAD ONC ARIA COURSE TREATMENT ELAPSED DAYS: 4
RAD ONC ARIA PLAN FRACTIONS TREATED TO DATE: 3
RAD ONC ARIA PLAN FRACTIONS TREATED TO DATE: 3
RAD ONC ARIA PLAN ID: NORMAL
RAD ONC ARIA PLAN ID: NORMAL
RAD ONC ARIA PLAN PRESCRIBED DOSE PER FRACTION: 6 GY
RAD ONC ARIA PLAN PRESCRIBED DOSE PER FRACTION: 6 GY
RAD ONC ARIA PLAN PRIMARY REFERENCE POINT: NORMAL
RAD ONC ARIA PLAN PRIMARY REFERENCE POINT: NORMAL
RAD ONC ARIA PLAN TOTAL FRACTIONS PRESCRIBED: 5
RAD ONC ARIA PLAN TOTAL FRACTIONS PRESCRIBED: 5
RAD ONC ARIA PLAN TOTAL PRESCRIBED DOSE: 3000 CGY
RAD ONC ARIA PLAN TOTAL PRESCRIBED DOSE: 3000 CGY
RAD ONC ARIA REFERENCE POINT DOSAGE GIVEN TO DATE: 18 GY
RAD ONC ARIA REFERENCE POINT DOSAGE GIVEN TO DATE: 18 GY
RAD ONC ARIA REFERENCE POINT ID: NORMAL
RAD ONC ARIA REFERENCE POINT ID: NORMAL
RAD ONC ARIA REFERENCE POINT SESSION DOSAGE GIVEN: 6 GY
RAD ONC ARIA REFERENCE POINT SESSION DOSAGE GIVEN: 6 GY

## 2024-12-18 ENCOUNTER — TELEPHONE (OUTPATIENT)
Dept: ONCOLOGY | Age: 79
End: 2024-12-18

## 2024-12-18 ENCOUNTER — APPOINTMENT (OUTPATIENT)
Dept: RADIATION ONCOLOGY | Age: 79
End: 2024-12-18
Payer: MEDICARE

## 2024-12-18 ENCOUNTER — HOSPITAL ENCOUNTER (OUTPATIENT)
Dept: RADIATION ONCOLOGY | Age: 79
Setting detail: RECURRING SERIES
Discharge: HOME | End: 2024-12-21
Payer: MEDICARE

## 2024-12-18 LAB
RAD ONC ARIA COURSE FIRST TREATMENT DATE: NORMAL
RAD ONC ARIA COURSE ID: NORMAL
RAD ONC ARIA COURSE INTENT: NORMAL
RAD ONC ARIA COURSE LAST TREATMENT DATE: NORMAL
RAD ONC ARIA COURSE SESSION NUMBER: 4
RAD ONC ARIA COURSE START DATE: NORMAL
RAD ONC ARIA COURSE TREATMENT ELAPSED DAYS: 5
RAD ONC ARIA PLAN FRACTIONS TREATED TO DATE: 4
RAD ONC ARIA PLAN FRACTIONS TREATED TO DATE: 4
RAD ONC ARIA PLAN ID: NORMAL
RAD ONC ARIA PLAN ID: NORMAL
RAD ONC ARIA PLAN PRESCRIBED DOSE PER FRACTION: 6 GY
RAD ONC ARIA PLAN PRESCRIBED DOSE PER FRACTION: 6 GY
RAD ONC ARIA PLAN PRIMARY REFERENCE POINT: NORMAL
RAD ONC ARIA PLAN PRIMARY REFERENCE POINT: NORMAL
RAD ONC ARIA PLAN TOTAL FRACTIONS PRESCRIBED: 5
RAD ONC ARIA PLAN TOTAL FRACTIONS PRESCRIBED: 5
RAD ONC ARIA PLAN TOTAL PRESCRIBED DOSE: 3000 CGY
RAD ONC ARIA PLAN TOTAL PRESCRIBED DOSE: 3000 CGY
RAD ONC ARIA REFERENCE POINT DOSAGE GIVEN TO DATE: 24 GY
RAD ONC ARIA REFERENCE POINT DOSAGE GIVEN TO DATE: 24 GY
RAD ONC ARIA REFERENCE POINT ID: NORMAL
RAD ONC ARIA REFERENCE POINT ID: NORMAL
RAD ONC ARIA REFERENCE POINT SESSION DOSAGE GIVEN: 6 GY
RAD ONC ARIA REFERENCE POINT SESSION DOSAGE GIVEN: 6 GY

## 2024-12-18 RX ORDER — DEXAMETHASONE 4 MG/1
4 TABLET ORAL EVERY 6 HOURS
Qty: 120 TABLET | Refills: 0 | Status: SHIPPED | OUTPATIENT
Start: 2024-12-18

## 2024-12-18 NOTE — TELEPHONE ENCOUNTER
Physician provider: Dr. Johnson  Reason for today's call (Please detail here patients chief complaint): HH orders  Last office visit:na  Patient Callback Number: [phone]  Was callback number verified?: Yes  Preferred pharmacy (If refill request): na  Calls to office within the last 48 hours?:No    Patient notified that their information will be routed to the [facility] clinical triage team for review. Patient is advised that they will receive a phone call from the triage department. If symptom related and symptoms worsen before receiving a call back, the patient has been advised to proceed to the nearest EDNan Schrader with [facility] called in asking if Dr. Johnson would sign off of Nursing, PT and and OT orders.  [phone]

## 2024-12-19 ENCOUNTER — TELEPHONE (OUTPATIENT)
Dept: ONCOLOGY | Age: 79
End: 2024-12-19

## 2024-12-19 ENCOUNTER — HOSPITAL ENCOUNTER (OUTPATIENT)
Dept: RADIATION ONCOLOGY | Age: 79
Setting detail: RECURRING SERIES
Discharge: HOME | End: 2024-12-22
Payer: MEDICARE

## 2024-12-19 LAB
RAD ONC ARIA COURSE FIRST TREATMENT DATE: NORMAL
RAD ONC ARIA COURSE ID: NORMAL
RAD ONC ARIA COURSE INTENT: NORMAL
RAD ONC ARIA COURSE LAST TREATMENT DATE: NORMAL
RAD ONC ARIA COURSE SESSION NUMBER: 5
RAD ONC ARIA COURSE START DATE: NORMAL
RAD ONC ARIA COURSE TREATMENT ELAPSED DAYS: 6
RAD ONC ARIA PLAN FRACTIONS TREATED TO DATE: 5
RAD ONC ARIA PLAN FRACTIONS TREATED TO DATE: 5
RAD ONC ARIA PLAN ID: NORMAL
RAD ONC ARIA PLAN ID: NORMAL
RAD ONC ARIA PLAN PRESCRIBED DOSE PER FRACTION: 6 GY
RAD ONC ARIA PLAN PRESCRIBED DOSE PER FRACTION: 6 GY
RAD ONC ARIA PLAN PRIMARY REFERENCE POINT: NORMAL
RAD ONC ARIA PLAN PRIMARY REFERENCE POINT: NORMAL
RAD ONC ARIA PLAN TOTAL FRACTIONS PRESCRIBED: 5
RAD ONC ARIA PLAN TOTAL FRACTIONS PRESCRIBED: 5
RAD ONC ARIA PLAN TOTAL PRESCRIBED DOSE: 3000 CGY
RAD ONC ARIA PLAN TOTAL PRESCRIBED DOSE: 3000 CGY
RAD ONC ARIA REFERENCE POINT DOSAGE GIVEN TO DATE: 30 GY
RAD ONC ARIA REFERENCE POINT DOSAGE GIVEN TO DATE: 30 GY
RAD ONC ARIA REFERENCE POINT ID: NORMAL
RAD ONC ARIA REFERENCE POINT ID: NORMAL
RAD ONC ARIA REFERENCE POINT SESSION DOSAGE GIVEN: 6 GY
RAD ONC ARIA REFERENCE POINT SESSION DOSAGE GIVEN: 6 GY

## 2024-12-19 PROCEDURE — 77373 STRTCTC BDY RAD THER TX DLVR: CPT

## 2024-12-19 PROCEDURE — 77336 RADIATION PHYSICS CONSULT: CPT

## 2024-12-19 NOTE — TELEPHONE ENCOUNTER
Physician provider: Dr. Johnson  Reason for today's call (Please detail here patients chief complaint): travel  Last office visit:n/a  Patient Callback Number: [phone]  Was callback number verified?: Yes  Preferred pharmacy (If refill request): n/a  Calls to office within the last 48 hours?:No    Patient notified that their information will be routed to the [facility] clinical triage team for review. Patient is advised that they will receive a phone call from the triage department. If symptom related and symptoms worsen before receiving a call back, the patient has been advised to proceed to the nearest ED.          Pt's son would like to know if it is okay for pt to travel to [place] about a six hour ride    [phone]

## 2024-12-19 NOTE — TELEPHONE ENCOUNTER
Chart reviewed by Dr. Johnson.  Unsuccessful return call to .  [facility] informing Dr. Johnson will sign orders.  Instructed to call back for any other questions/concerns.

## 2024-12-20 NOTE — TELEPHONE ENCOUNTER
Chart reviewed by NP.  Unsuccessful return call to son.  LVM informing She is high risk for developing a blood clot d/t holding Xarelto and having cancer diagnosis. Blood clot increases with prolonged immobility so I would recommend frequent stops for her to get up and walk around which would make the drive very long but is up to her. Instructed to call back for any questions.

## 2024-12-26 ENCOUNTER — CARE COORDINATION (OUTPATIENT)
Dept: CARE COORDINATION | Facility: CLINIC | Age: 79
End: 2024-12-26

## 2024-12-26 NOTE — CARE COORDINATION
Care Transitions Note    Follow Up Call     Attempted to reach patient for transitions of care follow up.  Unable to reach patient.      Outreach Attempts:   HIPAA compliant voicemail left for family, son,Darron.   Patient remains engaged with  Oncology. Will continue to outreach per protocol if no return call received. Engaged with [facility] and scheduled with [facility]-1/3/2025  Follow Up Appointment:   Future Appointments         Provider Specialty Dept Phone    1/3/2025 1:00 PM Joana Alcantara PA-C Palliative Care [phone]    1/3/2025 1:30 PM [facility] [phone]    1/3/2025 2:30 PM Tesha Johnson MD Oncology [phone]    2/3/2025 1:30 PM [facility] [phone]    2/3/2025 2:30 PM Tesha Johnson MD Oncology [phone]            Plan for follow-up call in 6-10 days based on severity of symptoms and risk factors. Plan for next call: symptom management    Lisset Salguero, RN

## 2024-12-30 DIAGNOSIS — C79.31 METASTASIS TO BRAIN (HCC): ICD-10-CM

## 2024-12-30 DIAGNOSIS — C18.2 MALIGNANT NEOPLASM OF ASCENDING COLON (HCC): Primary | ICD-10-CM

## 2024-12-31 ENCOUNTER — TELEPHONE (OUTPATIENT)
Dept: PRIMARY CARE CLINIC | Facility: CLINIC | Age: 79
End: 2024-12-31

## 2024-12-31 NOTE — TELEPHONE ENCOUNTER
Pt notified  orders started on 12/18/24 but pt did not have F2F with PCP for hospital discharge order  F/u appt   [facility] , [phone], notified:  Carline to notify : Rita.  Writer expecting call back and will discuss with .

## 2025-01-02 ENCOUNTER — CARE COORDINATION (OUTPATIENT)
Dept: CARE COORDINATION | Facility: CLINIC | Age: 80
End: 2025-01-02

## 2025-01-02 NOTE — CARE COORDINATION
Care Transitions Note    Follow Up Call     Patient Current Location:  Home: [address]  [address] [place] [id number]    LPN Care Coordinator contacted the patient by telephone. Verified name and  as identifiers.    Additional needs identified to be addressed with provider   No needs identified                 Method of communication with provider: none.    Care Summary Note: Patient reports doing fine with no issues reported at time of call.  She is engaged with [facility].    Appointments reviewed and are scheduled appropriately.      Advance Care Planning:   Does patient have an Advance Directive: reviewed during previous call, see note. .    Medication Review:  No changes since last call.       Assessments:   Goals Addressed                   This Visit's Progress     Returns to baseline activity level.   On track     Patient/Family able to obtain medicine after d/c     Patient/Family able to verbalize medicine changes     Patient/Family aware and attends follow up appointments s/p d/c.     Patient/Family agrees to notify provider of any barriers to plan of care.    Patient/Family agrees to notify provider of any symptoms that indicate a worsening of condition               Follow Up Appointment:   Reviewed upcoming appointment(s).  Future Appointments         Provider Specialty Dept Phone    1/3/2025 1:00 PM Joana Alcantara PA-C Palliative Care [phone]    1/3/2025 1:30 PM [facility] [phone]    1/3/2025 2:30 PM Tesha Johnson MD Oncology [phone]    2025 12:30 PM Jade Dyer MD Primary Care [phone]    2025 10:00 AM [facility] MR 1 Radiology [phone]    2/3/2025 1:30 PM [facility] [phone]    2/3/2025 2:30 PM Tesha Johnson MD Oncology [phone]            N Care Coordinator provided contact information.  Plan for follow-up call in 6-10 days based on severity of symptoms and risk factors.  Plan for next call: symptom management    Madeleine Pina LPN

## 2025-01-03 ENCOUNTER — HOSPITAL ENCOUNTER (OUTPATIENT)
Dept: LAB | Age: 80
Discharge: HOME | End: 2025-01-03
Payer: MEDICARE

## 2025-01-03 ENCOUNTER — OFFICE VISIT (OUTPATIENT)
Dept: ONCOLOGY | Age: 80
End: 2025-01-03

## 2025-01-03 ENCOUNTER — OFFICE VISIT (OUTPATIENT)
Dept: PALLATIVE CARE | Age: 80
End: 2025-01-03
Payer: MEDICARE

## 2025-01-03 VITALS
DIASTOLIC BLOOD PRESSURE: 70 MMHG | HEART RATE: 64 BPM | SYSTOLIC BLOOD PRESSURE: 114 MMHG | RESPIRATION RATE: 16 BRPM | WEIGHT: 108.9 LBS | BODY MASS INDEX: 20.04 KG/M2 | OXYGEN SATURATION: 98 % | TEMPERATURE: 98.3 F | HEIGHT: 62 IN

## 2025-01-03 DIAGNOSIS — C79.31 METASTATIC ADENOCARCINOMA TO BRAIN (HCC): Primary | ICD-10-CM

## 2025-01-03 DIAGNOSIS — G93.6 VASOGENIC EDEMA (HCC): Primary | ICD-10-CM

## 2025-01-03 DIAGNOSIS — Z79.899 HIGH RISK MEDICATION USE: ICD-10-CM

## 2025-01-03 DIAGNOSIS — Z51.5 PALLIATIVE CARE ENCOUNTER: ICD-10-CM

## 2025-01-03 DIAGNOSIS — C79.9 METASTATIC ADENOCARCINOMA (HCC): ICD-10-CM

## 2025-01-03 DIAGNOSIS — C79.31 METASTATIC ADENOCARCINOMA TO BRAIN (HCC): ICD-10-CM

## 2025-01-03 LAB
ALBUMIN SERPL-MCNC: 3.2 G/DL (ref 3.2–4.6)
ALBUMIN/GLOB SERPL: 1.1 (ref 1–1.9)
ALP SERPL-CCNC: 80 U/L (ref 35–104)
ALT SERPL-CCNC: 29 U/L (ref 8–45)
ANION GAP SERPL CALC-SCNC: 10 MMOL/L (ref 7–16)
AST SERPL-CCNC: 17 U/L (ref 15–37)
BASOPHILS # BLD: 0.1 K/UL (ref 0–0.2)
BASOPHILS NFR BLD: 0 % (ref 0–2)
BILIRUB SERPL-MCNC: 0.2 MG/DL (ref 0–1.2)
BUN SERPL-MCNC: 21 MG/DL (ref 8–23)
CALCIUM SERPL-MCNC: 8.7 MG/DL (ref 8.8–10.2)
CEA SERPL-MCNC: 25.3 NG/ML (ref 0–3.8)
CHLORIDE SERPL-SCNC: 99 MMOL/L (ref 98–107)
CO2 SERPL-SCNC: 28 MMOL/L (ref 20–29)
CREAT SERPL-MCNC: 0.65 MG/DL (ref 0.6–1.1)
DIFFERENTIAL METHOD BLD: ABNORMAL
EOSINOPHIL # BLD: 0 K/UL (ref 0–0.8)
EOSINOPHIL NFR BLD: 0 % (ref 0.5–7.8)
ERYTHROCYTE [DISTWIDTH] IN BLOOD BY AUTOMATED COUNT: 13.4 % (ref 11.9–14.6)
GLOBULIN SER CALC-MCNC: 2.9 G/DL (ref 2.3–3.5)
GLUCOSE SERPL-MCNC: 102 MG/DL (ref 70–99)
HCT VFR BLD AUTO: 42.9 % (ref 35.8–46.3)
HGB BLD-MCNC: 14.9 G/DL (ref 11.7–15.4)
IMM GRANULOCYTES # BLD AUTO: 0.4 K/UL (ref 0–0.5)
IMM GRANULOCYTES NFR BLD AUTO: 4 % (ref 0–5)
LYMPHOCYTES # BLD: 1 K/UL (ref 0.5–4.6)
LYMPHOCYTES NFR BLD: 8 % (ref 13–44)
MAGNESIUM SERPL-MCNC: 2.2 MG/DL (ref 1.8–2.4)
MCH RBC QN AUTO: 32.9 PG (ref 26.1–32.9)
MCHC RBC AUTO-ENTMCNC: 34.7 G/DL (ref 31.4–35)
MCV RBC AUTO: 94.7 FL (ref 82–102)
MONOCYTES # BLD: 0.9 K/UL (ref 0.1–1.3)
MONOCYTES NFR BLD: 7 % (ref 4–12)
NEUTS SEG # BLD: 10 K/UL (ref 1.7–8.2)
NEUTS SEG NFR BLD: 81 % (ref 43–78)
NRBC # BLD: 0 K/UL (ref 0–0.2)
PLATELET # BLD AUTO: 221 K/UL (ref 150–450)
PMV BLD AUTO: 8.9 FL (ref 9.4–12.3)
POTASSIUM SERPL-SCNC: 3.8 MMOL/L (ref 3.5–5.1)
PROT SERPL-MCNC: 6.1 G/DL (ref 6.3–8.2)
RBC # BLD AUTO: 4.53 M/UL (ref 4.05–5.2)
SODIUM SERPL-SCNC: 137 MMOL/L (ref 136–145)
WBC # BLD AUTO: 12.4 K/UL (ref 4.3–11.1)

## 2025-01-03 PROCEDURE — 85025 COMPLETE CBC W/AUTO DIFF WBC: CPT

## 2025-01-03 PROCEDURE — 1123F ACP DISCUSS/DSCN MKR DOCD: CPT | Performed by: NURSE PRACTITIONER

## 2025-01-03 PROCEDURE — 83735 ASSAY OF MAGNESIUM: CPT

## 2025-01-03 PROCEDURE — 80053 COMPREHEN METABOLIC PANEL: CPT

## 2025-01-03 PROCEDURE — 36415 COLL VENOUS BLD VENIPUNCTURE: CPT

## 2025-01-03 PROCEDURE — 99204 OFFICE O/P NEW MOD 45 MIN: CPT | Performed by: NURSE PRACTITIONER

## 2025-01-03 PROCEDURE — 82378 CARCINOEMBRYONIC ANTIGEN: CPT

## 2025-01-03 ASSESSMENT — ENCOUNTER SYMPTOMS
NAUSEA: 0
DIARRHEA: 0
VOMITING: 0

## 2025-01-03 ASSESSMENT — PATIENT HEALTH QUESTIONNAIRE - PHQ9
SUM OF ALL RESPONSES TO PHQ9 QUESTIONS 1 & 2: 0
SUM OF ALL RESPONSES TO PHQ QUESTIONS 1-9: 0
2. FEELING DOWN, DEPRESSED OR HOPELESS: NOT AT ALL
SUM OF ALL RESPONSES TO PHQ QUESTIONS 1-9: 0
1. LITTLE INTEREST OR PLEASURE IN DOING THINGS: NOT AT ALL

## 2025-01-03 NOTE — PROGRESS NOTES
(from the past 24 hour(s))   CBC with Auto Differential    Collection Time: 01/03/25  1:32 PM   Result Value Ref Range    WBC 12.4 (H) 4.3 - 11.1 K/uL    RBC 4.53 4.05 - 5.2 M/uL    Hemoglobin 14.9 11.7 - 15.4 g/dL    Hematocrit 42.9 35.8 - 46.3 %    MCV 94.7 82.0 - 102.0 FL    MCH 32.9 26.1 - 32.9 PG    MCHC 34.7 31.4 - 35.0 g/dL    RDW 13.4 11.9 - 14.6 %    Platelets 221 150 - 450 K/uL    MPV 8.9 (L) 9.4 - 12.3 FL    nRBC 0.00 0.0 - 0.2 K/uL    Neutrophils % 81 (H) 43 - 78 %    Lymphocytes % 8 (L) 13 - 44 %    Monocytes % 7 4.0 - 12.0 %    Eosinophils % 0 (L) 0.5 - 7.8 %    Basophils % 0 0.0 - 2.0 %    Immature Granulocytes % 4 0.0 - 5.0 %    Neutrophils Absolute 10.0 (H) 1.7 - 8.2 K/UL    Lymphocytes Absolute 1.0 0.5 - 4.6 K/UL    Monocytes Absolute 0.9 0.1 - 1.3 K/UL    Eosinophils Absolute 0.0 0.0 - 0.8 K/UL    Basophils Absolute 0.1 0.0 - 0.2 K/UL    Immature Granulocytes Absolute 0.4 0.0 - 0.5 K/UL    Differential Type AUTOMATED     Comprehensive Metabolic Panel    Collection Time: 01/03/25  1:32 PM   Result Value Ref Range    Sodium 137 136 - 145 mmol/L    Potassium 3.8 3.5 - 5.1 mmol/L    Chloride 99 98 - 107 mmol/L    CO2 28 20 - 29 mmol/L    Anion Gap 10 7 - 16 mmol/L    Glucose 102 (H) 70 - 99 mg/dL    BUN 21 8 - 23 MG/DL    Creatinine 0.65 0.60 - 1.10 MG/DL    Est, Glom Filt Rate >90 >60 ml/min/1.73m2    Calcium 8.7 (L) 8.8 - 10.2 MG/DL    Total Bilirubin 0.2 0.0 - 1.2 MG/DL    ALT 29 8 - 45 U/L    AST 17 15 - 37 U/L    Alk Phosphatase 80 35 - 104 U/L    Total Protein 6.1 (L) 6.3 - 8.2 g/dL    Albumin 3.2 3.2 - 4.6 g/dL    Globulin 2.9 2.3 - 3.5 g/dL    Albumin/Globulin Ratio 1.1 1.0 - 1.9     CEA    Collection Time: 01/03/25  1:32 PM   Result Value Ref Range    CEA 25.3 (H) 0.0 - 3.8 ng/mL   Magnesium    Collection Time: 01/03/25  1:32 PM   Result Value Ref Range    Magnesium 2.2 1.8 - 2.4 mg/dL         Imaging:  No results found for this or any previous visit.        [unfilled]    ASSESSMENT:   Diagnosis

## 2025-01-03 NOTE — PATIENT INSTRUCTIONS
01/03/2025 0.9  0.1 - 1.3 K/UL Final    Eosinophils Absolute 01/03/2025 0.0  0.0 - 0.8 K/UL Final    Basophils Absolute 01/03/2025 0.1  0.0 - 0.2 K/UL Final    Immature Granulocytes Absolute 01/03/2025 0.4  0.0 - 0.5 K/UL Final    Differential Type 01/03/2025 AUTOMATED    Final    Sodium 01/03/2025 137  136 - 145 mmol/L Final    Potassium 01/03/2025 3.8  3.5 - 5.1 mmol/L Final    Chloride 01/03/2025 99  98 - 107 mmol/L Final    CO2 01/03/2025 28  20 - 29 mmol/L Final    Anion Gap 01/03/2025 10  7 - 16 mmol/L Final    Glucose 01/03/2025 102 (H)  70 - 99 mg/dL Final    Comment: <70 mg/dL Consistent with, but not fully diagnostic of hypoglycemia.  100 - 125 mg/dL Impaired fasting glucose/consistent with pre-diabetes mellitus.  > 126 mg/dl Fasting glucose consistent with overt diabetes mellitus      BUN 01/03/2025 21  8 - 23 MG/DL Final    Creatinine 01/03/2025 0.65  0.60 - 1.10 MG/DL Final    Est, Glom Filt Rate 01/03/2025 >90  >60 ml/min/1.73m2 Final    Comment:    Pediatric calculator link: https://www.kidney.org/professionals/kdoqi/gfr_calculatorped     These results are not intended for use in patients <18 years of age.     eGFR results are calculated without a race factor using  the 2021 CKD-EPI equation. Careful clinical correlation is recommended, particularly when comparing to results calculated using previous equations.  The CKD-EPI equation is less accurate in patients with extremes of muscle mass, extra-renal metabolism of creatinine, excessive creatine ingestion, or following therapy that affects renal tubular secretion.      Calcium 01/03/2025 8.7 (L)  8.8 - 10.2 MG/DL Final    Total Bilirubin 01/03/2025 0.2  0.0 - 1.2 MG/DL Final    ALT 01/03/2025 29  8 - 45 U/L Final    AST 01/03/2025 17  15 - 37 U/L Final    Alk Phosphatase 01/03/2025 80  35 - 104 U/L Final    Total Protein 01/03/2025 6.1 (L)  6.3 - 8.2 g/dL Final    Albumin 01/03/2025 3.2  3.2 - 4.6 g/dL Final    Globulin 01/03/2025 2.9  2.3 - 3.5 g/dL

## 2025-01-09 ENCOUNTER — CARE COORDINATION (OUTPATIENT)
Dept: CARE COORDINATION | Facility: CLINIC | Age: 80
End: 2025-01-09

## 2025-01-09 NOTE — CARE COORDINATION
Care Transitions Note    Follow Up Call     Patient Current Location:  Home: [address] [place] [id number]    LPN Care Coordinator contacted the patient by telephone. Verified name and  as identifiers.    Additional needs identified to be addressed with provider   No needs identified                 Method of communication with provider: none.    Care Summary Note: Today is Mrs. Hui's 80th birthday. Wished patient a very happy birthday. She received a special delivery of a chocolate birthday cake from "[employer]. Patient plans to enjoy her day with her family.  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 Mrs. Hui has a very supportive family. Patient also saw Dr. Tesha Johnson on 1/3/25 prior to meeting with palliative.  Notes per Tesha Johnson as follows: \"Different scenarios of progressive disease reviewed with patient to monitor for symptoms per her and son's wishes.  She accepts the risk of progressive disease and is not interested in systemic therapy.  She does not want to have follow-up imaging since she is not wanting to pursue any therapy at this time which is understandable and we will respect her wishes.  She met with the hospice team and will reevaluate their services as needed going forward.\"  Patient voices no questions or concerns at this time.   Future appointments reviewed and are scheduled appropriately.     Advance Care Planning:   Does patient have an Advance Directive: reviewed during previous call, see note. .    Medication Review:  No changes since last call.     Assessments:   Goals Addressed                   This Visit's Progress     Returns to baseline activity level.   On track     Patient/Family able to obtain medicine after d/c     Patient/Family able to verbalize medicine changes     Patient/Family aware and attends follow up appointments s/p d/c.

## 2025-01-13 ENCOUNTER — TELEMEDICINE (OUTPATIENT)
Dept: PRIMARY CARE CLINIC | Facility: CLINIC | Age: 80
End: 2025-01-13

## 2025-01-13 DIAGNOSIS — Z00.00 MEDICARE ANNUAL WELLNESS VISIT, SUBSEQUENT: ICD-10-CM

## 2025-01-13 DIAGNOSIS — I50.9 HEART FAILURE, UNSPECIFIED HF CHRONICITY, UNSPECIFIED HEART FAILURE TYPE (HCC): ICD-10-CM

## 2025-01-13 DIAGNOSIS — Z09 HOSPITAL DISCHARGE FOLLOW-UP: Primary | ICD-10-CM

## 2025-01-13 DIAGNOSIS — E03.9 HYPOTHYROIDISM, UNSPECIFIED TYPE: ICD-10-CM

## 2025-01-13 DIAGNOSIS — M25.562 LEFT KNEE PAIN, UNSPECIFIED CHRONICITY: ICD-10-CM

## 2025-01-13 DIAGNOSIS — D64.9 ANEMIA, UNSPECIFIED TYPE: ICD-10-CM

## 2025-01-13 RX ORDER — BACLOFEN 10 MG/1
10 TABLET ORAL 2 TIMES DAILY
Qty: 20 TABLET | Refills: 0 | Status: SHIPPED | OUTPATIENT
Start: 2025-01-13

## 2025-01-13 RX ORDER — LEVOTHYROXINE SODIUM 50 UG/1
50 TABLET ORAL DAILY
Qty: 90 TABLET | Refills: 1 | Status: SHIPPED | OUTPATIENT
Start: 2025-01-13

## 2025-01-13 RX ORDER — BACILLUS COAGULANS 1B CELL
CAPSULE ORAL
Qty: 90 TABLET | Refills: 1 | Status: SHIPPED | OUTPATIENT
Start: 2025-01-13

## 2025-01-13 ASSESSMENT — LIFESTYLE VARIABLES
HOW OFTEN DO YOU HAVE A DRINK CONTAINING ALCOHOL: MONTHLY OR LESS
HOW MANY STANDARD DRINKS CONTAINING ALCOHOL DO YOU HAVE ON A TYPICAL DAY: 1 OR 2

## 2025-01-13 ASSESSMENT — PATIENT HEALTH QUESTIONNAIRE - PHQ9
SUM OF ALL RESPONSES TO PHQ QUESTIONS 1-9: 0
SUM OF ALL RESPONSES TO PHQ QUESTIONS 1-9: 0
2. FEELING DOWN, DEPRESSED OR HOPELESS: NOT AT ALL
SUM OF ALL RESPONSES TO PHQ QUESTIONS 1-9: 0
1. LITTLE INTEREST OR PLEASURE IN DOING THINGS: NOT AT ALL
SUM OF ALL RESPONSES TO PHQ9 QUESTIONS 1 & 2: 0
SUM OF ALL RESPONSES TO PHQ QUESTIONS 1-9: 0

## 2025-01-13 NOTE — PROGRESS NOTES
4 MG disintegrating tablet Take 1 tablet by mouth every 8 hours as needed for Nausea or Vomiting Yes Mahnaz Joseph APRN - CNP   melatonin 3 MG TABS tablet Take 2 tablets by mouth nightly as needed (insomnia) Yes Mahnaz Joseph APRN - CNP   tamsulosin (FLOMAX) 0.4 MG capsule Take 1 capsule by mouth daily Yes Mahnaz Joseph APRN - CNP   dicyclomine (BENTYL) 10 MG capsule Take 1 capsule by mouth 4 times daily as needed (abdominal cramping) Yes Mahnaz Jospeh APRN - CNP   pantoprazole (PROTONIX) 40 MG tablet Take 1 tablet by mouth every morning (before breakfast) Yes Mahnaz Joseph APRN - CNP   vitamin D-3 125 MCG (5000 UT) TABS tablet Take 1 tablet by mouth daily Yes Mahnaz Joseph APRN - CNP   levETIRAcetam (KEPPRA) 500 MG tablet Take 1 tablet by mouth in the morning and 1 tablet in the evening. Yes Mahnaz Joseph APRN - CNP   levothyroxine (SYNTHROID) 50 MCG tablet Take 1 tablet by mouth daily Yes Jade Dyer MD   baclofen (LIORESAL) 10 MG tablet Take 1 tablet by mouth nightly as needed (back pain) Yes Jade Dyer MD   acetaminophen (TYLENOL) 500 MG tablet Take 2 tablets by mouth every 6 hours as needed for Pain Yes Monet Khan MD   iron-vitamin C (VITRON-C)  MG TABS  mg daily Yes Jade Dyer MD   Probiotic Product (PROBIOTIC DAILY PO) Take by mouth daily Yes Monet Khan MD   Ascorbic Acid (VITAMIN C) 250 MG tablet Take 1 tablet by mouth daily Yes Monet Khan MD   Multiple Vitamin (MULTIVITAMIN ADULT PO) Take by mouth daily Yes Provider, Historical, MD       CareTeam (Including outside providers/suppliers regularly involved in providing care):   Patient Care Team:  Jade Dyer MD as PCP - General (Internal Medicine)  Jade Dyer MD as PCP - Empaneled Provider  Tesha Johnson MD (Hematology and Oncology)  Lisset Salguero RN as Care Transitions Nurse     Recommendations for Preventive Services Due: see orders and patient

## 2025-01-15 ENCOUNTER — CARE COORDINATION (OUTPATIENT)
Dept: CARE COORDINATION | Facility: CLINIC | Age: 80
End: 2025-01-15

## 2025-01-15 NOTE — CARE COORDINATION
Care Transitions Note    Final Call     Patient Current Location:  Home: [address] [place] [id number]    LPN Care Coordinator contacted the patient by telephone. Verified name and  as identifiers.    Patient graduated from the Care Transitions program on 1/15/25.  Patient/family verbalizes confidence in the ability to self-manage at this time..      Advance Care Planning:   Does patient have an Advance Directive: reviewed during previous call, see note. .    Handoff:   Patient was not referred to the ACM team due to patient declined services.      Care Summary Note: Patient reports feeling fine with no issues reported.  Patient was seen for f/u visit post hospital discharge via telehealth with PCP on 25.  Baclofen changed to 10mg orally bid.    Upcoming appointments reviewed and are scheduled appropriately.  Provided this LPN CC's number if any need should arise.      Assessments:   Goals Addressed                   This Visit's Progress     COMPLETED: Returns to baseline activity level.   On track     Patient/Family able to obtain medicine after d/c     Patient/Family able to verbalize medicine changes     Patient/Family aware and attends follow up appointments s/p d/c.     Patient/Family agrees to notify provider of any barriers to plan of care.    Patient/Family agrees to notify provider of any symptoms that indicate a worsening of condition               Upcoming Appointments:    Future Appointments         Provider Specialty Dept Phone    2025 10:45 AM (Arrive by 10:30 AM) [facility] [phone]    2025 9:30 AM Fernanda Villanueva MD Radiation Oncology [phone]    2/3/2025 1:30 PM PERIPHERAL Lab [phone]    2/3/2025 2:30 PM Tesha Johnson MD Oncology [phone]    2/3/2025 2:30 PM Joana Alcantara PA-C Palliative Care [phone]            Patient has agreed to contact primary care provider and/or specialist for any further questions, concerns,

## 2025-01-16 ENCOUNTER — PATIENT MESSAGE (OUTPATIENT)
Dept: PRIMARY CARE CLINIC | Facility: CLINIC | Age: 80
End: 2025-01-16

## 2025-01-17 ENCOUNTER — TELEPHONE (OUTPATIENT)
Dept: ONCOLOGY | Age: 80
End: 2025-01-17

## 2025-01-22 NOTE — PROGRESS NOTES
[facility] RADIATION ONCOLOGY CONSULTATION    Patient: Haven Hui MRN: [id number]  SSN: [id number]    YOB: 1945  Age: 80 y.o.  Sex: female      Other Providers: Dr. Johnson    CHIEF COMPLAINT: CNS lesion    DIAGNOSIS: Metastatic colon cancer    PREVIOUS RADIATION TREATMENT:  R frontal 21Gy completed 12/13/24  R parietal 21Gy completed 12/13/24  L frontal 30Gy completed 12/19/24  R occipital 30Gy completed 12/19/24    HISTORY OF PRESENT ILLNESS:  Haven Hui is a 80 y.o. female who I am seeing at the request of Dr. Johnson.    Ms. 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 She did well until 10/11/24 at which time she was found to have metastatic disease to the supraclavicular lymph nodes, cervical spine, liver and adrenal gland. MRI brain 12/5/24 showed multiple (5) bilateral supratentorial metastases with extensive vasogenic edema and 4-5mm midline shift. She was started on dexamethasone and admitted. Neurosurgery recommended no surgical intervention. She currently reports a mild headache as well as blurry vision and numbness in the R arm from the shoulder to the elbow which is stable on steroids.     INTERVAL HISTORY:  Repeat MRI 1/20/25 shows mild decreased size of numerous metastatic lesions with associated mild decreased vasogenic edema. PET/CT 10/11/24 shows focal uptake in hepatic segment 6 with FDG avid osseous, retroperitoneal, and pelvic soy metastases. There is mild acute sinuitis and she reports nasal discharge 2-4 weeks ago. She has elected no further systemic therapy. She continues to have headaches intermittently. She is taking dexamethasone 4mg with Tylenol. She tried to reduce dexamethasone to 2mg daily but experienced weakness in the morning with low energy.     PAST MEDICAL HISTORY:    Past Medical History:   Diagnosis Date    Anemia

## 2025-01-23 ENCOUNTER — HOSPITAL ENCOUNTER (OUTPATIENT)
Dept: RADIATION ONCOLOGY | Age: 80
Setting detail: RECURRING SERIES
Discharge: HOME | End: 2025-01-26
Payer: MEDICARE

## 2025-01-23 VITALS
OXYGEN SATURATION: 96 % | HEART RATE: 109 BPM | RESPIRATION RATE: 16 BRPM | DIASTOLIC BLOOD PRESSURE: 69 MMHG | TEMPERATURE: 99.1 F | SYSTOLIC BLOOD PRESSURE: 106 MMHG

## 2025-01-23 DIAGNOSIS — C18.9 COLON CANCER METASTASIZED TO BRAIN (HCC): Primary | ICD-10-CM

## 2025-01-23 DIAGNOSIS — C79.31 COLON CANCER METASTASIZED TO BRAIN (HCC): Primary | ICD-10-CM

## 2025-01-23 PROCEDURE — 99211 OFF/OP EST MAY X REQ PHY/QHP: CPT

## 2025-01-23 RX ORDER — DEXAMETHASONE 4 MG/1
4 TABLET ORAL
Qty: 90 TABLET | Refills: 0 | Status: SHIPPED | OUTPATIENT
Start: 2025-01-23

## 2025-01-23 RX ORDER — DOXYCYCLINE HYCLATE 100 MG
100 TABLET ORAL 2 TIMES DAILY
Qty: 10 TABLET | Refills: 0 | Status: SHIPPED | OUTPATIENT
Start: 2025-01-23 | End: 2025-01-28

## 2025-01-23 NOTE — PROGRESS NOTES
[facility] On[facility]: Established patient - follow up     Chief Complaint   Patient presents with    Follow-up     Dx; colon adenocarcinoma - metastatic     Referral Diagnosis: Other acute pulmonary embolism without acute cor pulmonale; Anemia, unspecified type  Referring Provider: Jade Dyer MD  Family History of Cancer/ Hematology Disorders: Brother with unspecified type of cancer  Presenting Symptoms: Shortness of breath and heart palpitations    History of Present Illness:  Ms. Hui is a 80 y.o. female who presents today for follow up regarding adenocarcinoma and hx of PE.  The past medical history is per below.    - At consultation, she was referred for recent acute pulmonary embolism after surgery without cor pulmonale and also on Xarelto, with anemia.  She was traveling to [place] and per Dr. Dyer, CT was scheduled prior to her trip.  Needed Xarelto refilled.  Due to patient's history of chronic anemia with hemoglobin ranging from 9-9.8 all her life per patient, proceeded w workup to include hemoglobin electrophoresis, CBC, CMP, and iron studies B12, vitamin D, viral serologies due to her recent transfusion, hapto, LDH SAGAR.  She does have dark stools being on iron every day.  We may need to alter her schedule depending on the results from today's blood work.  She has not had a colonoscopy in a long time and needs one but understands that she will most likely not be able to come off Xarelto with acute PE until repeat CT scan.  No hematuria or other bleeding noted.  - FU.  Patient is here for follow-up with her daughter.  Interim events noted.  She presented to the hospital with obstructive symptoms and upon eval she was found to have suspected appendicitis.  Upon surgery, she was found to have cecal adenocarcinoma metastatic to 11 out of 20 lymph nodes.  She was also found to have a liver lesion with recommended follow-up imaging.  At this time, I recommend a PET scan to determine if the

## 2025-01-23 NOTE — PROGRESS NOTES
Follow up Brain mets.  SRS 12-13-24.   RT end 12-19-24.  MRI brain 1-20-25.  F/u Palliative and Dr. Johnson 2-3-25.  F/u in 3 months after MRI brain.    Donna Garcia, RN

## 2025-01-27 ENCOUNTER — TELEPHONE (OUTPATIENT)
Dept: ONCOLOGY | Age: 80
End: 2025-01-27

## 2025-02-03 ENCOUNTER — OFFICE VISIT (OUTPATIENT)
Dept: ONCOLOGY | Age: 80
End: 2025-02-03
Payer: MEDICARE

## 2025-02-03 ENCOUNTER — TELEPHONE (OUTPATIENT)
Dept: PRIMARY CARE CLINIC | Facility: CLINIC | Age: 80
End: 2025-02-03

## 2025-02-03 ENCOUNTER — OFFICE VISIT (OUTPATIENT)
Dept: PALLATIVE CARE | Age: 80
End: 2025-02-03
Payer: MEDICARE

## 2025-02-03 ENCOUNTER — HOSPITAL ENCOUNTER (OUTPATIENT)
Dept: LAB | Age: 80
Discharge: HOME | End: 2025-02-03
Payer: MEDICARE

## 2025-02-03 VITALS
TEMPERATURE: 98.3 F | HEART RATE: 77 BPM | BODY MASS INDEX: 19.8 KG/M2 | DIASTOLIC BLOOD PRESSURE: 54 MMHG | OXYGEN SATURATION: 99 % | HEIGHT: 62 IN | SYSTOLIC BLOOD PRESSURE: 96 MMHG | WEIGHT: 107.6 LBS | RESPIRATION RATE: 14 BRPM

## 2025-02-03 DIAGNOSIS — M25.562 LEFT KNEE PAIN, UNSPECIFIED CHRONICITY: ICD-10-CM

## 2025-02-03 DIAGNOSIS — Z79.01 ANTICOAGULATED: ICD-10-CM

## 2025-02-03 DIAGNOSIS — Z71.89 GOALS OF CARE, COUNSELING/DISCUSSION: ICD-10-CM

## 2025-02-03 DIAGNOSIS — C79.9 METASTATIC ADENOCARCINOMA (HCC): ICD-10-CM

## 2025-02-03 DIAGNOSIS — C79.31 METASTATIC ADENOCARCINOMA TO BRAIN (HCC): ICD-10-CM

## 2025-02-03 DIAGNOSIS — R53.81 DEBILITY: ICD-10-CM

## 2025-02-03 DIAGNOSIS — I26.99 BILATERAL PULMONARY EMBOLISM (HCC): ICD-10-CM

## 2025-02-03 DIAGNOSIS — Z51.5 ENCOUNTER FOR PALLIATIVE CARE: ICD-10-CM

## 2025-02-03 DIAGNOSIS — R53.0 NEOPLASTIC MALIGNANT RELATED FATIGUE: ICD-10-CM

## 2025-02-03 DIAGNOSIS — C18.9 PRIMARY COLON CANCER WITH METASTASIS TO 7 OR MORE REGIONAL LYMPH NODES (N2B) (HCC): ICD-10-CM

## 2025-02-03 DIAGNOSIS — C77.2 PRIMARY COLON CANCER WITH METASTASIS TO 7 OR MORE REGIONAL LYMPH NODES (N2B) (HCC): ICD-10-CM

## 2025-02-03 DIAGNOSIS — G89.3 CANCER ASSOCIATED PAIN: Primary | ICD-10-CM

## 2025-02-03 DIAGNOSIS — C79.31 METASTATIC ADENOCARCINOMA TO BRAIN (HCC): Primary | ICD-10-CM

## 2025-02-03 LAB
ALBUMIN SERPL-MCNC: 2.4 G/DL (ref 3.2–4.6)
ALBUMIN/GLOB SERPL: 0.6 (ref 1–1.9)
ALP SERPL-CCNC: 237 U/L (ref 35–104)
ALT SERPL-CCNC: 31 U/L (ref 8–45)
ANION GAP SERPL CALC-SCNC: 15 MMOL/L (ref 7–16)
AST SERPL-CCNC: 23 U/L (ref 15–37)
BASOPHILS # BLD: 0.11 K/UL (ref 0–0.2)
BASOPHILS NFR BLD: 0.8 % (ref 0–2)
BILIRUB SERPL-MCNC: 0.4 MG/DL (ref 0–1.2)
BUN SERPL-MCNC: 13 MG/DL (ref 8–23)
CALCIUM SERPL-MCNC: 8.6 MG/DL (ref 8.8–10.2)
CEA SERPL-MCNC: 23.2 NG/ML (ref 0–3.8)
CHLORIDE SERPL-SCNC: 101 MMOL/L (ref 98–107)
CO2 SERPL-SCNC: 24 MMOL/L (ref 20–29)
CREAT SERPL-MCNC: 0.6 MG/DL (ref 0.6–1.1)
DIFFERENTIAL METHOD BLD: ABNORMAL
EOSINOPHIL # BLD: 0.02 K/UL (ref 0–0.8)
EOSINOPHIL NFR BLD: 0.1 % (ref 0.5–7.8)
ERYTHROCYTE [DISTWIDTH] IN BLOOD BY AUTOMATED COUNT: 15.2 % (ref 11.9–14.6)
GLOBULIN SER CALC-MCNC: 3.9 G/DL (ref 2.3–3.5)
GLUCOSE SERPL-MCNC: 188 MG/DL (ref 70–99)
HCT VFR BLD AUTO: 38.7 % (ref 35.8–46.3)
HGB BLD-MCNC: 13.3 G/DL (ref 11.7–15.4)
IMM GRANULOCYTES # BLD AUTO: 0.46 K/UL (ref 0–0.5)
IMM GRANULOCYTES NFR BLD AUTO: 3.4 % (ref 0–5)
LYMPHOCYTES # BLD: 1.02 K/UL (ref 0.5–4.6)
LYMPHOCYTES NFR BLD: 7.6 % (ref 13–44)
MCH RBC QN AUTO: 32.7 PG (ref 26.1–32.9)
MCHC RBC AUTO-ENTMCNC: 34.4 G/DL (ref 31.4–35)
MCV RBC AUTO: 95.1 FL (ref 82–102)
MONOCYTES # BLD: 0.67 K/UL (ref 0.1–1.3)
MONOCYTES NFR BLD: 5 % (ref 4–12)
NEUTS SEG # BLD: 11.22 K/UL (ref 1.7–8.2)
NEUTS SEG NFR BLD: 83.1 % (ref 43–78)
NRBC # BLD: 0 K/UL (ref 0–0.2)
PLATELET # BLD AUTO: 327 K/UL (ref 150–450)
PMV BLD AUTO: 9.4 FL (ref 9.4–12.3)
POTASSIUM SERPL-SCNC: 3.5 MMOL/L (ref 3.5–5.1)
PROT SERPL-MCNC: 6.3 G/DL (ref 6.3–8.2)
RBC # BLD AUTO: 4.07 M/UL (ref 4.05–5.2)
SODIUM SERPL-SCNC: 140 MMOL/L (ref 136–145)
WBC # BLD AUTO: 13.5 K/UL (ref 4.3–11.1)

## 2025-02-03 PROCEDURE — 1036F TOBACCO NON-USER: CPT | Performed by: INTERNAL MEDICINE

## 2025-02-03 PROCEDURE — 82378 CARCINOEMBRYONIC ANTIGEN: CPT

## 2025-02-03 PROCEDURE — 1090F PRES/ABSN URINE INCON ASSESS: CPT | Performed by: PHYSICIAN ASSISTANT

## 2025-02-03 PROCEDURE — 99215 OFFICE O/P EST HI 40 MIN: CPT | Performed by: INTERNAL MEDICINE

## 2025-02-03 PROCEDURE — 99214 OFFICE O/P EST MOD 30 MIN: CPT | Performed by: PHYSICIAN ASSISTANT

## 2025-02-03 PROCEDURE — G8428 CUR MEDS NOT DOCUMENT: HCPCS | Performed by: PHYSICIAN ASSISTANT

## 2025-02-03 PROCEDURE — 36415 COLL VENOUS BLD VENIPUNCTURE: CPT

## 2025-02-03 PROCEDURE — 1036F TOBACCO NON-USER: CPT | Performed by: PHYSICIAN ASSISTANT

## 2025-02-03 PROCEDURE — 1125F AMNT PAIN NOTED PAIN PRSNT: CPT | Performed by: INTERNAL MEDICINE

## 2025-02-03 PROCEDURE — 80053 COMPREHEN METABOLIC PANEL: CPT

## 2025-02-03 PROCEDURE — 1123F ACP DISCUSS/DSCN MKR DOCD: CPT | Performed by: PHYSICIAN ASSISTANT

## 2025-02-03 PROCEDURE — 99497 ADVNCD CARE PLAN 30 MIN: CPT | Performed by: PHYSICIAN ASSISTANT

## 2025-02-03 PROCEDURE — G8399 PT W/DXA RESULTS DOCUMENT: HCPCS | Performed by: INTERNAL MEDICINE

## 2025-02-03 PROCEDURE — G8399 PT W/DXA RESULTS DOCUMENT: HCPCS | Performed by: PHYSICIAN ASSISTANT

## 2025-02-03 PROCEDURE — G8427 DOCREV CUR MEDS BY ELIG CLIN: HCPCS | Performed by: INTERNAL MEDICINE

## 2025-02-03 PROCEDURE — 85025 COMPLETE CBC W/AUTO DIFF WBC: CPT

## 2025-02-03 PROCEDURE — 1090F PRES/ABSN URINE INCON ASSESS: CPT | Performed by: INTERNAL MEDICINE

## 2025-02-03 PROCEDURE — 1123F ACP DISCUSS/DSCN MKR DOCD: CPT | Performed by: INTERNAL MEDICINE

## 2025-02-03 PROCEDURE — G8420 CALC BMI NORM PARAMETERS: HCPCS | Performed by: INTERNAL MEDICINE

## 2025-02-03 PROCEDURE — G8420 CALC BMI NORM PARAMETERS: HCPCS | Performed by: PHYSICIAN ASSISTANT

## 2025-02-03 RX ORDER — LIDOCAINE 50 MG/G
1 PATCH TOPICAL DAILY
Qty: 10 PATCH | Refills: 0 | Status: SHIPPED | OUTPATIENT
Start: 2025-02-03 | End: 2025-02-13

## 2025-02-03 RX ORDER — OXYCODONE HYDROCHLORIDE 5 MG/1
5 TABLET ORAL EVERY 4 HOURS PRN
Qty: 42 TABLET | Refills: 0 | Status: SHIPPED | OUTPATIENT
Start: 2025-02-03 | End: 2025-02-09 | Stop reason: SDUPTHER

## 2025-02-03 RX ORDER — BACLOFEN 10 MG/1
10 TABLET ORAL 2 TIMES DAILY PRN
Qty: 60 TABLET | Refills: 1 | Status: SHIPPED | OUTPATIENT
Start: 2025-02-03 | End: 2025-04-04

## 2025-02-03 ASSESSMENT — PATIENT HEALTH QUESTIONNAIRE - PHQ9
SUM OF ALL RESPONSES TO PHQ9 QUESTIONS 1 & 2: 0
2. FEELING DOWN, DEPRESSED OR HOPELESS: NOT AT ALL
SUM OF ALL RESPONSES TO PHQ QUESTIONS 1-9: 0
1. LITTLE INTEREST OR PLEASURE IN DOING THINGS: NOT AT ALL

## 2025-02-03 NOTE — PROGRESS NOTES
107 mmol/L    CO2 24 20 - 29 mmol/L    Anion Gap 15 7 - 16 mmol/L    Glucose 188 (H) 70 - 99 mg/dL    BUN 13 8 - 23 MG/DL    Creatinine 0.60 0.60 - 1.10 MG/DL    Est, Glom Filt Rate >90 >60 ml/min/1.73m2    Calcium 8.6 (L) 8.8 - 10.2 MG/DL    Total Bilirubin 0.4 0.0 - 1.2 MG/DL    ALT 31 8 - 45 U/L    AST 23 15 - 37 U/L    Alk Phosphatase 237 (H) 35 - 104 U/L    Total Protein 6.3 6.3 - 8.2 g/dL    Albumin 2.4 (L) 3.2 - 4.6 g/dL    Globulin 3.9 (H) 2.3 - 3.5 g/dL    Albumin/Globulin Ratio 0.6 (L) 1.0 - 1.9     CEA    Collection Time: 02/03/25  1:23 PM   Result Value Ref Range    CEA 23.2 (H) 0.0 - 3.8 ng/mL       Imaging:  No valid procedures specified.    ASSESSMENT:   Diagnosis Orders   1. Encounter for palliative care  oxyCODONE (ROXICODONE) 5 MG immediate release tablet    lidocaine (LIDODERM) 5 %      2. Cancer associated pain  oxyCODONE (ROXICODONE) 5 MG immediate release tablet    lidocaine (LIDODERM) 5 %      3. Left knee pain, unspecified chronicity  baclofen (LIORESAL) 10 MG tablet      4. Neoplastic malignant related fatigue        5. Primary colon cancer with metastasis to 7 or more regional lymph nodes (N2b) (HCC)        6. Metastatic adenocarcinoma to brain (HCC)        7. Goals of care, counseling/discussion              PLAN:  Lab studies and imaging studies were personally reviewed.     Pertinent old records were reviewed from [facility] and [facility] hospitalization.     Cancer associated pain: She has a known lesion at C7, which could be contributing to her R upper back pain. Per oncology, will refer to rad onc for consideration of palliative chemo to this area. Pt has used oxycodone in the past with good relief, so will Rx 1 week supply for oxycodone 5mg 1/2-1 tab every 4-6 hours as needed. Can cont Tylenol PRN. Also would need repeat imaging for low back pain. Pt is agreeable to more imaging for palliative radiation although she continues to decline systemic therapy.   Neoplastic related fatigue: Worsening.

## 2025-02-03 NOTE — PATIENT INSTRUCTIONS
Patient Information from Today's Visit    The members of your [facility] are listed below:    Physician Provider: Tesha Johnson, Medical Oncologist  Advanced Practice Clinician: Mahnaz Ingram NP  Registered Nurse: Flora KRAFT RN  Navigator: Terra SHARP RN  Medical Assistant: Xin CARRILLO MA  : Bethany LE   Supportive Care Services: Ebonie JAY LMSW    Diagnosis: Metastatic colon cancer      Follow Up Instructions: 2 months.    Labs reviewed.  Symptoms reviewed.  Increase fluids and drink more Ensures.  For your steroids, continue to take 4mg in the morning and take 2mg in the afternoon.  On this dose, ressess for mentation, appetite, and pain.  Discussed possibility of PET scan.    Treatment Summary has been discussed and given to patient:N/A      Current Labs:   Hospital Outpatient Visit on 02/03/2025   Component Date Value Ref Range Status    WBC 02/03/2025 13.5 (H)  4.3 - 11.1 K/uL Final    RBC 02/03/2025 4.07  4.05 - 5.2 M/uL Final    Hemoglobin 02/03/2025 13.3  11.7 - 15.4 g/dL Final    Hematocrit 02/03/2025 38.7  35.8 - 46.3 % Final    MCV 02/03/2025 95.1  82.0 - 102.0 FL Final    MCH 02/03/2025 32.7  26.1 - 32.9 PG Final    MCHC 02/03/2025 34.4  31.4 - 35.0 g/dL Final    RDW 02/03/2025 15.2 (H)  11.9 - 14.6 % Final    Platelets 02/03/2025 327  150 - 450 K/uL Final    MPV 02/03/2025 9.4  9.4 - 12.3 FL Final    nRBC 02/03/2025 0.00  0.0 - 0.2 K/uL Final    **Note: Absolute NRBC parameter is now reported with Hemogram**    Neutrophils % 02/03/2025 83.1 (H)  43.0 - 78.0 % Final    Lymphocytes % 02/03/2025 7.6 (L)  13.0 - 44.0 % Final    Monocytes % 02/03/2025 5.0  4.0 - 12.0 % Final    Eosinophils % 02/03/2025 0.1 (L)  0.5 - 7.8 % Final    Basophils % 02/03/2025 0.8  0.0 - 2.0 % Final    Immature Granulocytes % 02/03/2025 3.4  0.0 - 5.0 % Final    Neutrophils Absolute 02/03/2025 11.22 (H)  1.70 - 8.20 K/UL Final    Lymphocytes Absolute 02/03/2025 1.02  0.50 - 4.60 K/UL Final    Monocytes

## 2025-02-04 DIAGNOSIS — M54.50 LOW BACK PAIN, UNSPECIFIED BACK PAIN LATERALITY, UNSPECIFIED CHRONICITY, UNSPECIFIED WHETHER SCIATICA PRESENT: ICD-10-CM

## 2025-02-04 DIAGNOSIS — C79.31 COLON CANCER METASTASIZED TO BRAIN (HCC): Primary | ICD-10-CM

## 2025-02-04 DIAGNOSIS — M25.511 RIGHT SHOULDER PAIN, UNSPECIFIED CHRONICITY: ICD-10-CM

## 2025-02-04 DIAGNOSIS — C18.9 COLON CANCER METASTASIZED TO BRAIN (HCC): Primary | ICD-10-CM

## 2025-02-04 ASSESSMENT — ENCOUNTER SYMPTOMS
COUGH: 1
BACK PAIN: 1

## 2025-02-09 DIAGNOSIS — Z51.5 ENCOUNTER FOR PALLIATIVE CARE: ICD-10-CM

## 2025-02-09 DIAGNOSIS — G89.3 CANCER ASSOCIATED PAIN: ICD-10-CM

## 2025-02-09 RX ORDER — OXYCODONE HYDROCHLORIDE 5 MG/1
5 TABLET ORAL EVERY 4 HOURS PRN
Qty: 180 TABLET | Refills: 0 | Status: SHIPPED | OUTPATIENT
Start: 2025-02-09 | End: 2025-03-11

## 2025-02-11 PROBLEM — C79.9 METASTATIC ADENOCARCINOMA (HCC): Status: ACTIVE | Noted: 2025-02-11

## 2025-02-13 ENCOUNTER — APPOINTMENT (OUTPATIENT)
Dept: RADIATION ONCOLOGY | Age: 80
End: 2025-02-13
Payer: MEDICARE

## 2025-02-17 ENCOUNTER — HOSPITAL ENCOUNTER (OUTPATIENT)
Dept: PET IMAGING | Age: 80
Discharge: HOME | End: 2025-02-20
Payer: MEDICARE

## 2025-02-17 DIAGNOSIS — C79.31 COLON CANCER METASTASIZED TO BRAIN (HCC): ICD-10-CM

## 2025-02-17 DIAGNOSIS — M54.50 LOW BACK PAIN, UNSPECIFIED BACK PAIN LATERALITY, UNSPECIFIED CHRONICITY, UNSPECIFIED WHETHER SCIATICA PRESENT: ICD-10-CM

## 2025-02-17 DIAGNOSIS — C18.9 COLON CANCER METASTASIZED TO BRAIN (HCC): ICD-10-CM

## 2025-02-17 DIAGNOSIS — M25.511 RIGHT SHOULDER PAIN, UNSPECIFIED CHRONICITY: ICD-10-CM

## 2025-02-17 LAB
GLUCOSE BLD STRIP.AUTO-MCNC: 109 MG/DL (ref 65–100)
SERVICE CMNT-IMP: ABNORMAL

## 2025-02-17 PROCEDURE — 6360000004 HC RX CONTRAST MEDICATION: Performed by: RADIOLOGY

## 2025-02-17 PROCEDURE — 78815 PET IMAGE W/CT SKULL-THIGH: CPT

## 2025-02-17 PROCEDURE — A9609 HC RX DIAGNOSTIC RADIOPHARMACEUTICAL: HCPCS | Performed by: RADIOLOGY

## 2025-02-17 PROCEDURE — 3430000000 HC RX DIAGNOSTIC RADIOPHARMACEUTICAL: Performed by: RADIOLOGY

## 2025-02-17 PROCEDURE — 82962 GLUCOSE BLOOD TEST: CPT

## 2025-02-17 PROCEDURE — 2500000003 HC RX 250 WO HCPCS: Performed by: RADIOLOGY

## 2025-02-17 RX ORDER — FLUDEOXYGLUCOSE F 18 200 MCI/ML
11.5 INJECTION, SOLUTION INTRAVENOUS
Status: COMPLETED | OUTPATIENT
Start: 2025-02-17 | End: 2025-02-17

## 2025-02-17 RX ORDER — DIATRIZOATE MEGLUMINE AND DIATRIZOATE SODIUM 660; 100 MG/ML; MG/ML
10 SOLUTION ORAL; RECTAL
Status: DISCONTINUED | OUTPATIENT
Start: 2025-02-17 | End: 2025-02-21 | Stop reason: HOSPADM

## 2025-02-17 RX ORDER — SODIUM CHLORIDE 0.9 % (FLUSH) 0.9 %
20 SYRINGE (ML) INJECTION AS NEEDED
Status: DISCONTINUED | OUTPATIENT
Start: 2025-02-17 | End: 2025-02-21 | Stop reason: HOSPADM

## 2025-02-17 RX ADMIN — FLUDEOXYGLUCOSE F 18 11.5 MILLICURIE: 200 INJECTION, SOLUTION INTRAVENOUS at 12:58

## 2025-02-17 RX ADMIN — DIATRIZOATE MEGLUMINE AND DIATRIZOATE SODIUM 10 ML: 660; 100 LIQUID ORAL; RECTAL at 12:58

## 2025-02-17 RX ADMIN — SODIUM CHLORIDE, PRESERVATIVE FREE 20 ML: 5 INJECTION INTRAVENOUS at 12:58

## 2025-02-19 ENCOUNTER — HOSPITAL ENCOUNTER (OUTPATIENT)
Dept: RADIATION ONCOLOGY | Age: 80
Setting detail: RECURRING SERIES
Discharge: HOME | End: 2025-02-22
Payer: MEDICARE

## 2025-02-19 VITALS
HEART RATE: 78 BPM | SYSTOLIC BLOOD PRESSURE: 110 MMHG | BODY MASS INDEX: 20.05 KG/M2 | TEMPERATURE: 98.3 F | OXYGEN SATURATION: 97 % | DIASTOLIC BLOOD PRESSURE: 76 MMHG | WEIGHT: 109.6 LBS

## 2025-02-19 DIAGNOSIS — H54.7 VISUAL IMPAIRMENT: ICD-10-CM

## 2025-02-19 DIAGNOSIS — C79.31 METASTATIC ADENOCARCINOMA TO BRAIN (HCC): Primary | ICD-10-CM

## 2025-02-19 PROCEDURE — 77290 THER RAD SIMULAJ FIELD CPLX: CPT

## 2025-02-19 PROCEDURE — 99211 OFF/OP EST MAY X REQ PHY/QHP: CPT

## 2025-02-19 ASSESSMENT — PAIN DESCRIPTION - LOCATION: LOCATION: ARM;LEG

## 2025-02-19 ASSESSMENT — PAIN SCALES - GENERAL: PAINLEVEL_OUTOF10: 8

## 2025-02-19 NOTE — PROGRESS NOTES
[facility] RADIATION ONCOLOGY CONSULTATION    Patient: Haven Hui MRN: [id number]  SSN: [id number]    YOB: 1945  Age: 80 y.o.  Sex: female      Other Providers: Dr. Johnson    CHIEF COMPLAINT: CNS lesion    DIAGNOSIS: Metastatic colon cancer    PREVIOUS RADIATION TREATMENT:  R frontal 21Gy completed 12/13/24  R parietal 21Gy completed 12/13/24  L frontal 30Gy completed 12/19/24  R occipital 30Gy completed 12/19/24    HISTORY OF PRESENT ILLNESS:  Haven Hui is a 80 y.o. female who I am seeing at the request of Dr. Johnson.    Ms. 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 She did well until 10/11/24 at which time she was found to have metastatic disease to the supraclavicular lymph nodes, cervical spine, liver and adrenal gland. MRI brain 12/5/24 showed multiple (5) bilateral supratentorial metastases with extensive vasogenic edema and 4-5mm midline shift. She was started on dexamethasone and admitted. Neurosurgery recommended no surgical intervention. She currently reports a mild headache as well as blurry vision and numbness in the R arm from the shoulder to the elbow which is stable on steroids.     INTERVAL HISTORY:  Since her last visit in radiation oncology Ms. Hui has experienced pain in the right arm which radiates down the arm and causes numbness. She has a sensation of fluid behind her ears and changes in vision both up close and far away. The vision changes started gradually about 2 months ago. The sensation of fluid behind her ears started a month ago. She denies headaches and nausea. She also reports pain right sided back pain that radiates down her leg. She reports generalized lethargy not improved on dexamethasone 4mg daily.     PAST MEDICAL HISTORY:    Past Medical History:   Diagnosis Date    Anemia     Rx iron    Cecal cancer (HCC)

## 2025-02-19 NOTE — PROGRESS NOTES
Follow up Metastatic Colon Cancer.  Pt has right upper arm throbbing that goes down right leg at times.  She states it is a constant 7, level 8 at present.  She takes Oxycodone at night.  Taking Decadron 4mg daily.  C/o ears being clogged and states she cannot see or text.  This has gotten worse since last visit.  CONSENTS SIGNED FOR RT.  CT SIM TODAY.  STAT MRI BRAIN ORDERED AND SCHEDULED FOR VISUAL IMPAIRMENT.    Donna Garcia RN

## 2025-02-21 ENCOUNTER — TELEPHONE (OUTPATIENT)
Dept: RADIATION ONCOLOGY | Age: 80
End: 2025-02-21

## 2025-02-21 NOTE — TELEPHONE ENCOUNTER
MD had reached out to patient several times to give results of scan and was unable to get patient.  Nurse called this afternoon to talked with patient and give her the information that MD relayed.  Nurse let patient know there was no major change from her MRI a month ago.  There are no new areas of cancer.  The previous areas are there and still have some swelling around them.  MD recommends that she continue her dexamethasone 4 mg twice daily and to see her ophthalmologist.  Patient states understanding and is appreciative.

## 2025-02-25 DIAGNOSIS — C79.31 COLON CANCER METASTASIZED TO BRAIN (HCC): Primary | ICD-10-CM

## 2025-02-25 DIAGNOSIS — C18.9 COLON CANCER METASTASIZED TO BRAIN (HCC): Primary | ICD-10-CM

## 2025-03-04 DIAGNOSIS — C79.31 METASTATIC ADENOCARCINOMA TO BRAIN (HCC): Primary | ICD-10-CM

## 2025-03-04 DIAGNOSIS — Z79.899 HIGH RISK MEDICATION USE: ICD-10-CM

## 2025-03-04 RX ORDER — PANTOPRAZOLE SODIUM 40 MG/1
40 TABLET, DELAYED RELEASE ORAL
Qty: 90 TABLET | Refills: 1 | Status: SHIPPED | OUTPATIENT
Start: 2025-03-04

## 2025-03-04 RX ORDER — LEVETIRACETAM 500 MG/1
500 TABLET ORAL EVERY 12 HOURS
Qty: 60 TABLET | Refills: 3 | Status: SHIPPED | OUTPATIENT
Start: 2025-03-04

## 2025-03-12 ENCOUNTER — TELEPHONE (OUTPATIENT)
Dept: RADIATION ONCOLOGY | Age: 80
End: 2025-03-12

## 2025-03-12 DIAGNOSIS — C79.31 METASTATIC ADENOCARCINOMA TO BRAIN (HCC): ICD-10-CM

## 2025-03-12 DIAGNOSIS — R53.1 WEAKNESS: ICD-10-CM

## 2025-03-12 DIAGNOSIS — C79.9 METASTATIC ADENOCARCINOMA (HCC): Primary | ICD-10-CM

## 2025-03-12 DIAGNOSIS — R53.81 DEBILITY: ICD-10-CM

## 2025-03-12 NOTE — TELEPHONE ENCOUNTER
Pt son has questions re: palliative care and if there is coverage for things like a bed that this pt needs down stairs because it is difficult for her to move up and down the staircase.  He had additional questions re: palliative care vs hospice care and what type things could be covered.

## 2025-03-12 NOTE — TELEPHONE ENCOUNTER
Unsuccessful return call to son who is listed on TAMERA.  [facility] requesting to call patient's insurance and inquire which medical supply company it covers.  Informed that once that information is obtained, the bed can be ordered.

## 2025-03-14 ENCOUNTER — TELEPHONE (OUTPATIENT)
Dept: PALLATIVE CARE | Age: 80
End: 2025-03-14

## 2025-03-14 ENCOUNTER — TELEPHONE (OUTPATIENT)
Dept: ONCOLOGY | Age: 80
End: 2025-03-14

## 2025-03-14 NOTE — TELEPHONE ENCOUNTER
Physician provider: Dr. Johnson  Reason for today's call (Please detail here patients chief complaint): DME  Last office visit:NA  Patient Callback Number: [phone]  Was callback number verified?: Yes  Preferred pharmacy (If refill request): NA  Veriified that patient confirmed no refills left at pharmacy? :No  Calls to office within the last 48 hours?:No    Warm Transfer to (For Red Words): No    Patient notified that their information will be routed to the [facility] clinical triage team for review. Patient is advised that they will receive a phone call from the triage department. If symptom related and symptoms worsen before receiving a call back, the patient has been advised to proceed to the nearest ED.     [facility]  [phone]  Patient was advised to get a hospital bed. DME provider need more documents, [employer] will need demographics, prescription, and medical notes  Fax# [phone]

## 2025-03-14 NOTE — TELEPHONE ENCOUNTER
Returned call to pt as requested. Pt stated she wishes to get a hospital bed downstairs, because it is getting harder for her to get up the stairs. Discussed that pt can call the number on the back of her insurance card and ask her insurance company which medical supply company they cover. She will plan to let us know this so that we can order her hospital bed.    Bethany Lange, [facility]-BC  Palliative Care

## 2025-03-28 DIAGNOSIS — M25.562 LEFT KNEE PAIN, UNSPECIFIED CHRONICITY: ICD-10-CM

## 2025-03-28 DIAGNOSIS — E03.9 HYPOTHYROIDISM, UNSPECIFIED TYPE: ICD-10-CM

## 2025-03-28 RX ORDER — BACLOFEN 10 MG/1
10 TABLET ORAL 2 TIMES DAILY PRN
Qty: 60 TABLET | Refills: 1 | Status: SHIPPED | OUTPATIENT
Start: 2025-03-28 | End: 2025-05-27

## 2025-03-31 RX ORDER — LEVOTHYROXINE SODIUM 50 UG/1
50 TABLET ORAL DAILY
Qty: 90 TABLET | Refills: 1 | OUTPATIENT
Start: 2025-03-31

## 2025-04-15 DIAGNOSIS — E03.9 HYPOTHYROIDISM, UNSPECIFIED TYPE: ICD-10-CM

## 2025-04-15 RX ORDER — LEVOTHYROXINE SODIUM 50 MCG
TABLET ORAL
Qty: 60 TABLET | OUTPATIENT
Start: 2025-04-15

## (undated) DEVICE — SYRINGE, LUER SLIP, STERILE, 60ML: Brand: MEDLINE

## (undated) DEVICE — TROCARS: Brand: KII® BLUNT TIP ACCESS SYSTEM

## (undated) DEVICE — PUMP SUC IRR TBNG L10FT W/ HNDPC ASSEMB STRYKEFLOW 2

## (undated) DEVICE — TROCAR: Brand: KII FIOS FIRST ENTRY

## (undated) DEVICE — SOLUTION IRRIG 3000ML 0.9% SOD CHL USP UROMATIC PLAS CONT

## (undated) DEVICE — CANISTER, RIGID, 2000CC: Brand: MEDLINE INDUSTRIES, INC.

## (undated) DEVICE — RELOAD STPL SZ 0 L45MM DIA3.5MM 0DEG STD REG TISS BLU TI

## (undated) DEVICE — RELOAD STPL L75MM OPN H3.8MM CLS 1.5MM WIRE DIA0.2MM REG

## (undated) DEVICE — SUTURE SZ 0 27IN 5/8 CIR UR-6  TAPER PT VIOLET ABSRB VICRYL J603H

## (undated) DEVICE — TUBING INSUFFLATION SMK EVAC HI FLO SET PNEUMOCLEAR

## (undated) DEVICE — GENERAL LAPAROSCOPY: Brand: MEDLINE INDUSTRIES, INC.

## (undated) DEVICE — ELECTRODE BLDE L6.5IN CAUT EXT DISP

## (undated) DEVICE — YANKAUER,BULB TIP,W/O VENT,RIGID,STERILE: Brand: MEDLINE

## (undated) DEVICE — KENDALL RADIOLUCENT FOAM MONITORING ELECTRODE RECTANGULAR SHAPE: Brand: KENDALL

## (undated) DEVICE — NEEDLE SYR 18GA L1.5IN RED PLAS HUB S STL BLNT FILL W/O

## (undated) DEVICE — CONNECTOR TBNG OD5-7MM O2 END DISP

## (undated) DEVICE — GLOVE SURG SZ 75 CRM LTX FREE POLYISOPRENE POLYMER BEAD ANTI

## (undated) DEVICE — RESERVOIR,SUCTION,100CC,SILICONE: Brand: MEDLINE

## (undated) DEVICE — DRAIN SURG 19FR 0.25IN SIL RND W/ TRCR INDIC DOT RADPQ FULL

## (undated) DEVICE — BAG SPEC REM 224ML W4XL6IN DIA10MM 1 HND GYN DISP ENDOPCH

## (undated) DEVICE — SINGLE PORT MANIFOLD: Brand: NEPTUNE 2

## (undated) DEVICE — LIQUIBAND RAPID ADHESIVE 36/CS 0.8ML: Brand: MEDLINE

## (undated) DEVICE — DRAPE TWL SURG 16X26IN BLU ORB04] ALLCARE INC]

## (undated) DEVICE — STRIP,CLOSURE,WOUND,MEDI-STRIP,1/2X4: Brand: MEDLINE

## (undated) DEVICE — GAUZE,SPONGE,4"X4",16PLY,STRL,LF,10/TRAY: Brand: MEDLINE

## (undated) DEVICE — SUTURE PDS II SZ 1 L96IN ABSRB VLT TP-1 L65MM 1/2 CIR Z880G

## (undated) DEVICE — 1LYRTR 16FR10ML 100%SILI SNAP: Brand: MEDLINE INDUSTRIES, INC.

## (undated) DEVICE — TROCAR: Brand: KII® OPTICAL ACCESS SYSTEM

## (undated) DEVICE — SOLUTION IRRIG 1000ML 0.9% SOD CHL USP POUR PLAS BTL

## (undated) DEVICE — TUBING, SUCTION, 1/4" X 10', STRAIGHT: Brand: MEDLINE

## (undated) DEVICE — AIRLIFE™ OXYGEN TUBING 7 FEET (2.1 M) CRUSH RESISTANT OXYGEN TUBING, VINYL TIPPED: Brand: AIRLIFE™

## (undated) DEVICE — GLOVE SURG SZ 7 L12IN FNGR THK79MIL GRN LTX FREE

## (undated) DEVICE — Device

## (undated) DEVICE — SUTURE MCRYL SZ 4-0 L27IN ABSRB UD L19MM PS-2 1/2 CIR PRIM Y426H

## (undated) DEVICE — CANNULA NSL ORAL AD FOR CAPNOFLEX CO2 O2 AIRLFE

## (undated) DEVICE — TROCAR: Brand: KII® SLEEVE

## (undated) DEVICE — ENDOSCOPIC KIT 1.1+ OP4 CA DE 2 GWN AAMI LEVEL 3

## (undated) DEVICE — SPONGE LAPAROTOMY W18XL18IN WHITE STRUNG RADIOPAQUE STERILE

## (undated) DEVICE — SYRINGE MED 10ML LUERLOCK TIP W/O SFTY DISP

## (undated) DEVICE — SUTURE PERMAHAND SZ 2-0 L18IN NONABSORBABLE BLK L26MM SH C012D

## (undated) DEVICE — SYRINGE MEDICAL 3ML CLEAR PLASTIC STANDARD NON CONTROL LUERLOCK TIP DISPOSABLE

## (undated) DEVICE — GLOVE SURG SZ 65 THK91MIL LTX FREE SYN POLYISOPRENE

## (undated) DEVICE — GLOVE SURG SZ 65 CRM LTX FREE POLYISOPRENE POLYMER BEAD ANTI

## (undated) DEVICE — STAPLER INT L60MM REG TISS BLU B FRM 8 FIRING 2 ROW AUTO

## (undated) DEVICE — GAUZE,SPONGE,4"X4",12PLY,WOVEN,NS,LF: Brand: MEDLINE

## (undated) DEVICE — STAPLER INT L75MM CUT LN L73MM STPL LN L77MM BLU B FRM 8

## (undated) DEVICE — SUTURE PERMAHAND SZ 3-0 L18IN NONABSORBABLE BLK L26MM SH C013D

## (undated) DEVICE — JELLY LUBRICATING 10GM PREFIL SYR LUBE

## (undated) DEVICE — DISSECTOR ENDOSCP EXTRA VW OVL DISTENSION BLLN

## (undated) DEVICE — CUTTER ENDOSCP L340MM LIN ARTC SGL STROKE FIRING ENDOPATH